# Patient Record
Sex: FEMALE | Race: WHITE | NOT HISPANIC OR LATINO | Employment: OTHER | URBAN - METROPOLITAN AREA
[De-identification: names, ages, dates, MRNs, and addresses within clinical notes are randomized per-mention and may not be internally consistent; named-entity substitution may affect disease eponyms.]

---

## 2017-01-16 ENCOUNTER — TELEPHONE (OUTPATIENT)
Dept: INTERNAL MEDICINE | Facility: CLINIC | Age: 62
End: 2017-01-16

## 2017-01-16 ENCOUNTER — PATIENT MESSAGE (OUTPATIENT)
Dept: INTERNAL MEDICINE | Facility: CLINIC | Age: 62
End: 2017-01-16

## 2017-01-16 NOTE — TELEPHONE ENCOUNTER
----- Message from Mason Landon sent at 1/16/2017  2:11 PM CST -----  Contact: self  Appointment Request From: Martha Mcfarlane         With Provider: Nas Mcneill MD [-Primary Care Physician-]        Would Accept With:Only the person I've selected        Preferred Date Range: From 1/16/2017 To 1/29/2017        Preferred Times: Any        Reason for visit: Request an Appt        Comments:      Right now I have a yearly physical appointment with Dr. Mcneill on Monday, Jan. 23, 2017 at 1:45p.m. I usually try to get an early morning appointment since I do the lab work while I have fasted. If you are able to change my appointment to a morning one within the next two weeks, that would be fine. You may reply through [facility].

## 2017-01-23 ENCOUNTER — OFFICE VISIT (OUTPATIENT)
Dept: INTERNAL MEDICINE | Facility: CLINIC | Age: 62
End: 2017-01-23
Payer: COMMERCIAL

## 2017-01-23 VITALS
WEIGHT: 142.63 LBS | SYSTOLIC BLOOD PRESSURE: 120 MMHG | BODY MASS INDEX: 19.32 KG/M2 | HEART RATE: 80 BPM | HEIGHT: 72 IN | TEMPERATURE: 98 F | DIASTOLIC BLOOD PRESSURE: 74 MMHG

## 2017-01-23 DIAGNOSIS — Z12.31 ENCOUNTER FOR SCREENING MAMMOGRAM FOR MALIGNANT NEOPLASM OF BREAST: ICD-10-CM

## 2017-01-23 DIAGNOSIS — J20.8 ACUTE BACTERIAL BRONCHITIS: ICD-10-CM

## 2017-01-23 DIAGNOSIS — E21.3 HYPERPARATHYROIDISM: ICD-10-CM

## 2017-01-23 DIAGNOSIS — M81.0 OSTEOPOROSIS: ICD-10-CM

## 2017-01-23 DIAGNOSIS — B96.89 ACUTE BACTERIAL BRONCHITIS: ICD-10-CM

## 2017-01-23 DIAGNOSIS — K59.00 CONSTIPATION, UNSPECIFIED CONSTIPATION TYPE: ICD-10-CM

## 2017-01-23 DIAGNOSIS — Z00.00 ROUTINE PHYSICAL EXAMINATION: Primary | ICD-10-CM

## 2017-01-23 DIAGNOSIS — K59.09 CHRONIC CONSTIPATION: ICD-10-CM

## 2017-01-23 PROCEDURE — 99999 PR PBB SHADOW E&M-EST. PATIENT-LVL III: CPT | Mod: PBBFAC,,, | Performed by: INTERNAL MEDICINE

## 2017-01-23 PROCEDURE — 99396 PREV VISIT EST AGE 40-64: CPT | Mod: S$GLB,,, | Performed by: INTERNAL MEDICINE

## 2017-01-23 RX ORDER — DOXYCYCLINE 100 MG/1
100 CAPSULE ORAL 2 TIMES DAILY
Qty: 20 CAPSULE | Refills: 0 | Status: SHIPPED | OUTPATIENT
Start: 2017-01-23 | End: 2017-05-04

## 2017-01-23 RX ORDER — PREDNISONE 20 MG/1
20 TABLET ORAL 2 TIMES DAILY
Qty: 10 TABLET | Refills: 0 | Status: SHIPPED | OUTPATIENT
Start: 2017-01-23 | End: 2017-01-28

## 2017-01-23 NOTE — PROGRESS NOTES
Subjective:       Patient ID: Martha Mcfarlane is a 61 y.o. female.    Chief Complaint: Annual Exam and Cough    HPI Comments: Patient comes in for annual exam but she states she's been sick for nearly 2 weeks.  Originally was having cough with thick sputum and temperature to 101.  In the last several days the temperature is improving but she still having cough with some green mucus.  Maybe even she's tried ibuprofen and Mucinex.  Her son was ill with a similar illness but she has improved she said arthritis, renal insufficiency, renal stones, osteoporosis and parathyroidism.  Previously seeing endocrinology but not recently.    Review of Systems   Constitutional: Positive for fatigue and fever. Negative for activity change, appetite change, chills and unexpected weight change.   HENT: Positive for congestion. Negative for hearing loss, rhinorrhea, sore throat and trouble swallowing.    Eyes: Negative for discharge and visual disturbance.   Respiratory: Positive for cough and wheezing. Negative for chest tightness and shortness of breath.    Cardiovascular: Negative for chest pain, palpitations and leg swelling.   Gastrointestinal: Negative for abdominal pain, blood in stool, constipation, diarrhea and vomiting.   Endocrine: Negative for polydipsia and polyuria.   Genitourinary: Negative for difficulty urinating, dysuria, hematuria and menstrual problem.   Musculoskeletal: Negative for arthralgias, joint swelling, neck pain and neck stiffness.   Skin: Negative for rash.   Neurological: Negative for weakness and headaches.   Psychiatric/Behavioral: Negative for confusion and dysphoric mood.       Objective:      Physical Exam   Constitutional: She is oriented to person, place, and time. She appears well-developed and well-nourished.   HENT:   Head: Normocephalic and atraumatic.   Right Ear: Tympanic membrane, external ear and ear canal normal.   Left Ear: Tympanic membrane, external ear and ear canal normal.   Nose:  Nose normal.   Mouth/Throat: Oropharynx is clear and moist and mucous membranes are normal. No oropharyngeal exudate.   Eyes: Conjunctivae and EOM are normal. Pupils are equal, round, and reactive to light. Right eye exhibits no discharge. Left eye exhibits no discharge.   Neck: Normal range of motion. Neck supple. No JVD present. No thyromegaly present.   Cardiovascular: Normal rate, regular rhythm, normal heart sounds and intact distal pulses.    No murmur heard.  Pulmonary/Chest: Effort normal. No respiratory distress. She has wheezes (Mild and diffuse). She has rales (mild and diffuse).   Abdominal: Soft. Bowel sounds are normal. She exhibits no mass. There is no tenderness.   Musculoskeletal: Normal range of motion. She exhibits no edema or tenderness.   Lymphadenopathy:     She has no cervical adenopathy.        Right: No supraclavicular adenopathy present.        Left: No supraclavicular adenopathy present.   Neurological: She is alert and oriented to person, place, and time. She has normal reflexes. No cranial nerve deficit.   Skin: No rash noted.   Psychiatric: She has a normal mood and affect. She does not exhibit a depressed mood.       Assessment:       1. Routine physical examination    2. Hyperparathyroidism    3. Chronic constipation    4. Osteoporosis    5. Encounter for screening mammogram for malignant neoplasm of breast    6. Constipation, unspecified constipation type        Plan:       Martha LARA was seen today for annual exam and cough.    Diagnoses and all orders for this visit:    Routine physical examination  -     Lipid panel; Future  -     CBC auto differential; Future  -     Comprehensive metabolic panel; Future  -     TSH; Future    Hyperparathyroidism  -     PTH, intact; Future  -     Vitamin D; Future    Chronic constipation  -     Lipid panel; Future  -     CBC auto differential; Future  -     Comprehensive metabolic panel; Future  -     TSH; Future    Osteoporosis  -     PTH, intact;  Future    Encounter for screening mammogram for malignant neoplasm of breast  -     Mammo Digital Screening Bilat with CAD; Future  -     Lipid panel; Future  -     CBC auto differential; Future  -     Comprehensive metabolic panel; Future  -     TSH; Future    Constipation, unspecified constipation type    Acute bacterial bronchitis    Other orders  -     predniSONE (DELTASONE) 20 MG tablet; Take 1 tablet (20 mg total) by mouth 2 (two) times daily.  -     doxycycline (MONODOX) 100 MG capsule; Take 1 capsule (100 mg total) by mouth 2 (two) times daily.        call if respiratory symptoms fail to improve or worsen.  Labs next week with a flu shot.

## 2017-01-23 NOTE — MR AVS SNAPSHOT
[facility]  [address]  [facility] [address]  Phone: [phone]  Fax: [phone]                  Martha Mcfarlane   2017 1:45 PM   Office Visit    Description:  Female : 1955   Provider:  Nas Mcneill MD   Department:  [facility]           Reason for Visit     Annual Exam     Cough           Diagnoses this Visit        Comments    Routine physical examination    -  Primary     Hyperparathyroidism         Chronic constipation         Osteoporosis         Encounter for screening mammogram for malignant neoplasm of breast         Constipation, unspecified constipation type         Acute bacterial bronchitis                To Do List           Goals (5 Years of Data)     None       These Medications        Disp Refills Start End    predniSONE (DELTASONE) 20 MG tablet 10 tablet 0 2017    Take 1 tablet (20 mg total) by mouth 2 (two) times daily. - Oral    Pharmacy: [facility] [address] [place] [id number] [facility] [address] Ph #: [phone]       doxycycline (MONODOX) 100 MG capsule 20 capsule 0 2017     Take 1 capsule (100 mg total) by mouth 2 (two) times daily. - Oral    Pharmacy: [address] [address] [address] Ph #: [phone]         [facility]     [facility] Care [facility] -  Assistance  Registered nurses in the [facility] provide clinical advisement, health education, appointment booking, and other advisory services.  Call for this free service at [phone].             Medications           Message regarding Medications     Verify the changes and/or additions to your medication regime listed below are the same as discussed with your clinician today.  If any of these changes or additions are incorrect, please notify your healthcare provider.        START taking these NEW medications        Refills    predniSONE (DELTASONE) 20 MG tablet 0    Sig: Take 1 tablet (20 mg  "total) by mouth 2 (two) times daily.    Class: Normal    Route: Oral    doxycycline (MONODOX) 100 MG capsule 0    Sig: Take 1 capsule (100 mg total) by mouth 2 (two) times daily.    Class: Normal    Route: Oral           Verify that the below list of medications is an accurate representation of the medications you are currently taking.  If none reported, the list may be blank. If incorrect, please contact your healthcare provider. Carry this list with you in case of emergency.           Current Medications     aspirin (ECOTRIN) 81 MG EC tablet Take 81 mg by mouth once daily.    CALCIUM CARBONATE/VITAMIN D3 (VITAMIN D-3 ORAL) Take by mouth.    doxycycline (MONODOX) 100 MG capsule Take 1 capsule (100 mg total) by mouth 2 (two) times daily.    fish oil-omega-3 fatty acids 300-1,000 mg capsule Take 2 g by mouth once daily.    hydrochlorothiazide (MICROZIDE) 12.5 mg capsule Take 1 capsule (12.5 mg total) by mouth once daily.    magnesium 30 mg Tab Take by mouth.    multivitamin (ONE DAILY MULTIVITAMIN) per tablet Take 1 tablet by mouth once daily.    predniSONE (DELTASONE) 20 MG tablet Take 1 tablet (20 mg total) by mouth 2 (two) times daily.           Clinical Reference Information           Vital Signs - Last Recorded  Most recent update: 1/23/2017  2:21 PM by Precious Mcfadden    BP Pulse Temp Ht Wt BMI    120/74 80 98.1 °F (36.7 °C) (Oral) 6' 1" (1.854 m) 64.7 kg (142 lb 10.2 oz) 18.82 kg/m2      Blood Pressure          Most Recent Value    BP  120/74      Allergies as of 1/23/2017     Orange Flavor    Iodine And Iodide Containing Products    Shellfish Containing Products    Benadryl [Diphenhydramine Hcl]      Immunizations Administered on Date of Encounter - 1/23/2017     None      Orders Placed During Today's Visit     Future Labs/Procedures Expected by Expires    CBC auto differential  1/23/2017 1/23/2018    Comprehensive metabolic panel  1/23/2017 1/23/2018    Lipid panel  1/23/2017 1/23/2018    Mammo Digital " Screening Bilat with CAD  1/23/2017 3/26/2018    PTH, intact  1/23/2017 4/23/2017    TSH  1/23/2017 1/23/2018    Vitamin D  1/23/2017 (Approximate) 4/23/2017

## 2017-01-30 ENCOUNTER — IMMUNIZATION (OUTPATIENT)
Dept: INTERNAL MEDICINE | Facility: CLINIC | Age: 62
End: 2017-01-30
Payer: COMMERCIAL

## 2017-01-30 ENCOUNTER — LAB VISIT (OUTPATIENT)
Dept: LAB | Facility: HOSPITAL | Age: 62
End: 2017-01-30
Attending: INTERNAL MEDICINE
Payer: COMMERCIAL

## 2017-01-30 DIAGNOSIS — Z12.31 ENCOUNTER FOR SCREENING MAMMOGRAM FOR MALIGNANT NEOPLASM OF BREAST: ICD-10-CM

## 2017-01-30 DIAGNOSIS — E21.3 HYPERPARATHYROIDISM: ICD-10-CM

## 2017-01-30 DIAGNOSIS — K59.09 CHRONIC CONSTIPATION: ICD-10-CM

## 2017-01-30 DIAGNOSIS — Z00.00 ROUTINE PHYSICAL EXAMINATION: ICD-10-CM

## 2017-01-30 DIAGNOSIS — M81.0 OSTEOPOROSIS: ICD-10-CM

## 2017-01-30 LAB
25(OH)D3+25(OH)D2 SERPL-MCNC: 32 NG/ML
ALBUMIN SERPL BCP-MCNC: 3.9 G/DL
ALP SERPL-CCNC: 72 U/L
ALT SERPL W/O P-5'-P-CCNC: 10 U/L
ANION GAP SERPL CALC-SCNC: 6 MMOL/L
AST SERPL-CCNC: 12 U/L
BASOPHILS # BLD AUTO: 0.05 K/UL
BASOPHILS NFR BLD: 1 %
BILIRUB SERPL-MCNC: 0.6 MG/DL
BUN SERPL-MCNC: 16 MG/DL
CALCIUM SERPL-MCNC: 9.4 MG/DL
CHLORIDE SERPL-SCNC: 102 MMOL/L
CHOLEST/HDLC SERPL: 3 {RATIO}
CO2 SERPL-SCNC: 32 MMOL/L
CREAT SERPL-MCNC: 0.8 MG/DL
DIFFERENTIAL METHOD: NORMAL
EOSINOPHIL # BLD AUTO: 0.1 K/UL
EOSINOPHIL NFR BLD: 2.2 %
ERYTHROCYTE [DISTWIDTH] IN BLOOD BY AUTOMATED COUNT: 13.2 %
EST. GFR  (AFRICAN AMERICAN): >60 ML/MIN/1.73 M^2
EST. GFR  (NON AFRICAN AMERICAN): >60 ML/MIN/1.73 M^2
GLUCOSE SERPL-MCNC: 90 MG/DL
HCT VFR BLD AUTO: 40.3 %
HDL/CHOLESTEROL RATIO: 33.8 %
HDLC SERPL-MCNC: 154 MG/DL
HDLC SERPL-MCNC: 52 MG/DL
HGB BLD-MCNC: 13.4 G/DL
LDLC SERPL CALC-MCNC: 89.8 MG/DL
LYMPHOCYTES # BLD AUTO: 1.5 K/UL
LYMPHOCYTES NFR BLD: 29.4 %
MCH RBC QN AUTO: 29.2 PG
MCHC RBC AUTO-ENTMCNC: 33.3 %
MCV RBC AUTO: 88 FL
MONOCYTES # BLD AUTO: 0.5 K/UL
MONOCYTES NFR BLD: 10.2 %
NEUTROPHILS # BLD AUTO: 2.9 K/UL
NEUTROPHILS NFR BLD: 57 %
NONHDLC SERPL-MCNC: 102 MG/DL
PLATELET # BLD AUTO: 315 K/UL
PMV BLD AUTO: 9.9 FL
POTASSIUM SERPL-SCNC: 4 MMOL/L
PROT SERPL-MCNC: 7.3 G/DL
PTH-INTACT SERPL-MCNC: 108 PG/ML
RBC # BLD AUTO: 4.59 M/UL
SODIUM SERPL-SCNC: 140 MMOL/L
TRIGL SERPL-MCNC: 61 MG/DL
TSH SERPL DL<=0.005 MIU/L-ACNC: 0.85 UIU/ML
WBC # BLD AUTO: 5 K/UL

## 2017-01-30 PROCEDURE — 36415 COLL VENOUS BLD VENIPUNCTURE: CPT

## 2017-01-30 PROCEDURE — 90471 IMMUNIZATION ADMIN: CPT | Mod: S$GLB,,, | Performed by: INTERNAL MEDICINE

## 2017-01-30 PROCEDURE — 82306 VITAMIN D 25 HYDROXY: CPT

## 2017-01-30 PROCEDURE — 90688 IIV4 VACCINE SPLT 0.5 ML IM: CPT | Mod: S$GLB,,, | Performed by: INTERNAL MEDICINE

## 2017-01-30 PROCEDURE — 80061 LIPID PANEL: CPT

## 2017-01-30 PROCEDURE — 80053 COMPREHEN METABOLIC PANEL: CPT

## 2017-01-30 PROCEDURE — 84443 ASSAY THYROID STIM HORMONE: CPT

## 2017-01-30 PROCEDURE — 83970 ASSAY OF PARATHORMONE: CPT

## 2017-01-30 PROCEDURE — 85025 COMPLETE CBC W/AUTO DIFF WBC: CPT

## 2017-02-01 ENCOUNTER — PATIENT MESSAGE (OUTPATIENT)
Dept: INTERNAL MEDICINE | Facility: CLINIC | Age: 62
End: 2017-02-01

## 2017-02-01 DIAGNOSIS — E21.3 HYPERPARATHYROIDISM: Primary | ICD-10-CM

## 2017-03-01 ENCOUNTER — PATIENT MESSAGE (OUTPATIENT)
Dept: INTERNAL MEDICINE | Facility: CLINIC | Age: 62
End: 2017-03-01

## 2017-03-31 ENCOUNTER — PATIENT MESSAGE (OUTPATIENT)
Dept: ENDOCRINOLOGY | Facility: CLINIC | Age: 62
End: 2017-03-31

## 2017-05-04 ENCOUNTER — OFFICE VISIT (OUTPATIENT)
Dept: INTERNAL MEDICINE | Facility: CLINIC | Age: 62
End: 2017-05-04
Payer: COMMERCIAL

## 2017-05-04 VITALS
BODY MASS INDEX: 19.41 KG/M2 | WEIGHT: 143.31 LBS | DIASTOLIC BLOOD PRESSURE: 80 MMHG | HEART RATE: 62 BPM | SYSTOLIC BLOOD PRESSURE: 122 MMHG | HEIGHT: 72 IN

## 2017-05-04 DIAGNOSIS — K59.09 CHRONIC CONSTIPATION: ICD-10-CM

## 2017-05-04 DIAGNOSIS — R11.2 NAUSEA AND VOMITING, INTRACTABILITY OF VOMITING NOT SPECIFIED, UNSPECIFIED VOMITING TYPE: Primary | ICD-10-CM

## 2017-05-04 DIAGNOSIS — H00.012 HORDEOLUM EXTERNUM OF RIGHT LOWER EYELID: ICD-10-CM

## 2017-05-04 DIAGNOSIS — G47.00 INSOMNIA, UNSPECIFIED TYPE: ICD-10-CM

## 2017-05-04 PROCEDURE — 99213 OFFICE O/P EST LOW 20 MIN: CPT | Mod: S$GLB,,, | Performed by: INTERNAL MEDICINE

## 2017-05-04 PROCEDURE — 99999 PR PBB SHADOW E&M-EST. PATIENT-LVL III: CPT | Mod: PBBFAC,,, | Performed by: INTERNAL MEDICINE

## 2017-05-04 PROCEDURE — 1160F RVW MEDS BY RX/DR IN RCRD: CPT | Mod: S$GLB,,, | Performed by: INTERNAL MEDICINE

## 2017-05-04 RX ORDER — PROMETHAZINE HYDROCHLORIDE 12.5 MG/1
12.5 TABLET ORAL EVERY 6 HOURS PRN
Qty: 12 TABLET | Refills: 0 | Status: SHIPPED | OUTPATIENT
Start: 2017-05-04 | End: 2017-07-25

## 2017-05-04 NOTE — MR AVS SNAPSHOT
[facility]  [address]  [facility] [address]  Phone: [phone]  Fax: [phone]                  Martha Mcfarlane   2017 1:45 PM   Office Visit    Description:  Female : 1955   Provider:  Nas Mcneill MD   Department:  [facility]           Reason for Visit     Nausea           Diagnoses this Visit        Comments    Nausea and vomiting, intractability of vomiting not specified, unspecified vomiting type    -  Primary     Hordeolum externum of right lower eyelid     Significantly improving with home remedy.    Chronic constipation         Insomnia, unspecified type                To Do List           Goals (5 Years of Data)     None       These Medications        Disp Refills Start End    promethazine (PHENERGAN) 12.5 MG Tab 12 tablet 0 2017     Take 1 tablet (12.5 mg total) by mouth every 6 (six) hours as needed. - Oral    Pharmacy: [facility] [address] [place] [address] Ph #: [phone]         [facility]     [facility] On Call Nurse Care Line -  Assistance  Unless otherwise directed by your provider, please contact [facility], our nurse care line that is available for  assistance.     Registered nurses in the [facility] Center provide: appointment scheduling, clinical advisement, health education, and other advisory services.  Call: [phone] (toll free)               Medications           Message regarding Medications     Verify the changes and/or additions to your medication regime listed below are the same as discussed with your clinician today.  If any of these changes or additions are incorrect, please notify your healthcare provider.        START taking these NEW medications        Refills    promethazine (PHENERGAN) 12.5 MG Tab 0    Sig: Take 1 tablet (12.5 mg total) by mouth every 6 (six) hours as needed.    Class: Normal    Route: Oral      STOP taking these medications      doxycycline (MONODOX) 100 MG capsule Take 1 capsule (100 mg total) by mouth 2 (two) times daily.    hydrochlorothiazide (MICROZIDE) 12.5 mg capsule Take 1 capsule (12.5 mg total) by mouth once daily.           Verify that the below list of medications is an accurate representation of the medications you are currently taking.  If none reported, the list may be blank. If incorrect, please contact your healthcare provider. Carry this list with you in case of emergency.           Current Medications     aspirin (ECOTRIN) 81 MG EC tablet Take 81 mg by mouth once daily.    CALCIUM CARBONATE/VITAMIN D3 (VITAMIN D-3 ORAL) Take by mouth.    fish oil-omega-3 fatty acids 300-1,000 mg capsule Take 2 g by mouth once daily.    magnesium 30 mg Tab Take by mouth.    multivitamin (ONE DAILY MULTIVITAMIN) per tablet Take 1 tablet by mouth once daily.    promethazine (PHENERGAN) 12.5 MG Tab Take 1 tablet (12.5 mg total) by mouth every 6 (six) hours as needed.           Clinical Reference Information           Your Vitals Were     BP Pulse Height Weight BMI    122/80 62 6' (1.829 m) 65 kg (143 lb 4.8 oz) 19.43 kg/m2      Blood Pressure          Most Recent Value    BP  122/80      Allergies as of 5/4/2017     Orange Flavor    Iodine And Iodide Containing Products    Shellfish Containing Products    Benadryl [Diphenhydramine Hcl]      Immunizations Administered on Date of Encounter - 5/4/2017     None      Language Assistance Services     ATTENTION: Language assistance services are available, free of charge. Please call [phone].      ATENCIÓN: Si habla español, tiene a soliman disposición servicios gratuitos de asistencia lingüística. Llame al [phone].     [facility] Ý: N?u b?n nói Ti?ng Vi?t, có các d?ch v? h? tr? ngôn ng? mi?n phí dành cho b?n. G?i s? [phone].         [facility] complies with applicable Federal civil rights laws and does not discriminate on the basis of race, color, national origin, age,  disability, or sex.

## 2017-05-04 NOTE — PROGRESS NOTES
Subjective:       Patient ID: Martha Mcfarlane is a 61 y.o. female.    Chief Complaint: Nausea    HPI Comments: Patient complains of about 3 days of nausea and vomiting improved somewhat similar syndrome is been ill and she didn't eat anything unusual.  No diarrhea or abdominal tenderness without problem he denies any urinary symptoms.  She has a resolving stye on the right lower eyelid..  She is having some insomnia since this started but we'll treated symptomatically.    Nausea   Associated symptoms include nausea and vomiting. Pertinent negatives include no arthralgias, chest pain, headaches, joint swelling or weakness.     Review of Systems   Constitutional: Negative for activity change and unexpected weight change.   HENT: Negative for hearing loss, rhinorrhea and trouble swallowing.    Eyes: Positive for redness (stye right lower lid). Negative for discharge and visual disturbance.   Respiratory: Negative for chest tightness and wheezing.    Cardiovascular: Negative for chest pain and palpitations.   Gastrointestinal: Positive for constipation, nausea and vomiting. Negative for blood in stool and diarrhea.   Endocrine: Negative for polydipsia and polyuria.   Genitourinary: Negative for difficulty urinating, dysuria, hematuria and menstrual problem.   Musculoskeletal: Negative for arthralgias and joint swelling.   Neurological: Negative for weakness and headaches.   Psychiatric/Behavioral: Negative for confusion and dysphoric mood.       Objective:      Physical Exam   Constitutional: She is oriented to person, place, and time. She appears well-developed and well-nourished. No distress.   HENT:   Head: Normocephalic and atraumatic.   Mouth/Throat: Oropharynx is clear and moist. No oropharyngeal exudate.   Eyes: Pupils are equal, round, and reactive to light. No scleral icterus.   Resolving area of swelling at the right lower eyelid probable stye   Neck: Normal range of motion. Neck supple. No thyromegaly present.    Cardiovascular: Normal rate and regular rhythm.    No murmur heard.  Pulmonary/Chest: Effort normal and breath sounds normal. She has no wheezes.   Abdominal: Soft. Bowel sounds are normal. She exhibits no distension and no mass. There is no tenderness. There is no rebound and no guarding. No hernia.   Musculoskeletal: She exhibits no tenderness.   Lymphadenopathy:     She has no cervical adenopathy.   Neurological: She is alert and oriented to person, place, and time.   Skin: No rash noted.   Psychiatric: She has a normal mood and affect.       Assessment:       1. Nausea and vomiting, intractability of vomiting not specified, unspecified vomiting type    2. Hordeolum externum of right lower eyelid    3. Chronic constipation    4. Insomnia, unspecified type        Plan:       Martha LARA was seen today for nausea.    Diagnoses and all orders for this visit:    Nausea and vomiting, intractability of vomiting not specified, unspecified vomiting type    Hordeolum externum of right lower eyelid  Comments:  Significantly improving with home remedy.    Chronic constipation    Insomnia, unspecified type    Other orders  -     promethazine (PHENERGAN) 12.5 MG Tab; Take 1 tablet (12.5 mg total) by mouth every 6 (six) hours as needed.        [place] diet.  Trial of Phenergan as above.  Call if any problems recur

## 2017-06-14 ENCOUNTER — PATIENT MESSAGE (OUTPATIENT)
Dept: ENDOCRINOLOGY | Facility: CLINIC | Age: 62
End: 2017-06-14

## 2017-07-25 ENCOUNTER — OFFICE VISIT (OUTPATIENT)
Dept: ENDOCRINOLOGY | Facility: CLINIC | Age: 62
End: 2017-07-25
Payer: COMMERCIAL

## 2017-07-25 VITALS
HEIGHT: 72 IN | DIASTOLIC BLOOD PRESSURE: 80 MMHG | SYSTOLIC BLOOD PRESSURE: 138 MMHG | BODY MASS INDEX: 19.56 KG/M2 | WEIGHT: 144.38 LBS | HEART RATE: 70 BPM

## 2017-07-25 DIAGNOSIS — N20.0 NEPHROLITHIASIS: ICD-10-CM

## 2017-07-25 DIAGNOSIS — E21.3 HYPERPARATHYROIDISM: ICD-10-CM

## 2017-07-25 DIAGNOSIS — M81.0 OSTEOPOROSIS WITHOUT CURRENT PATHOLOGICAL FRACTURE, UNSPECIFIED OSTEOPOROSIS TYPE: Primary | ICD-10-CM

## 2017-07-25 PROCEDURE — 99214 OFFICE O/P EST MOD 30 MIN: CPT | Mod: S$GLB,,, | Performed by: INTERNAL MEDICINE

## 2017-07-25 PROCEDURE — 99999 PR PBB SHADOW E&M-EST. PATIENT-LVL III: CPT | Mod: PBBFAC,,, | Performed by: INTERNAL MEDICINE

## 2017-07-25 RX ORDER — MONTELUKAST SODIUM 10 MG/1
1 TABLET ORAL DAILY
COMMUNITY
Start: 2017-07-11 | End: 2019-01-29

## 2017-07-25 RX ORDER — ALENDRONATE SODIUM 70 MG/1
TABLET ORAL
Qty: 12 TABLET | Refills: 3 | Status: SHIPPED | OUTPATIENT
Start: 2017-07-25 | End: 2018-07-10

## 2017-07-25 RX ORDER — AMOXICILLIN 500 MG/1
1 CAPSULE ORAL 3 TIMES DAILY
COMMUNITY
Start: 2017-07-10 | End: 2019-01-29

## 2017-07-25 RX ORDER — ALENDRONATE SODIUM 70 MG/1
TABLET ORAL
Qty: 4 TABLET | Refills: 0 | Status: SHIPPED | OUTPATIENT
Start: 2017-07-25 | End: 2017-07-25 | Stop reason: SDUPTHER

## 2017-07-25 NOTE — PROGRESS NOTES
"Subjective:     Patient ID: Martha Mcfarlane is a 61 y.o. female.    Chief Complaint: Osteoporosis    HPI:   Ms. Mcfarlane is a 61 y.o. female who is here for a follow-up visit for evaluation osteoporosis on outside BMDs.    Previously tried:  Prolia 60 mg due to severe constipation  Reclast 5 mg IV once a yearly X 1 - severe constipation  Tried alendronate - last dose was 2014  Takes calcium and vitamin D daily       Had bad sinus infection on amoxicillin.     Reports a fall in [employer] (3/2017) - no fractures  Reports fall in her attic (5/2017) - no fractures    Has constipation for many years.Taking miralax regularly. Denies recent fractures. Reports kidney stones this year, has had stones in the past. No hypercalcemia on recent tests and urine calcium level three years ago was within range.     Still walking and very active (cuts her own grass)     Review of Systems   Constitutional: Negative for chills and fever.   HENT: Negative for congestion.         Sinus infection   Eyes: Negative for visual disturbance.   Respiratory: Negative for chest tightness and shortness of breath.    Cardiovascular: Negative for chest pain, palpitations and leg swelling.   Gastrointestinal: Negative for abdominal pain and vomiting.   Genitourinary: Negative for dysuria.   Musculoskeletal: Negative for arthralgias.   Skin: Negative for rash.   Neurological: Negative for weakness.   Hematological: Does not bruise/bleed easily.   Psychiatric/Behavioral: Negative for sleep disturbance.        Objective:     Physical Exam    Vitals:    07/25/17 1455   BP: (!) 140/82   BP Location: Left arm   Patient Position: Sitting   BP Method: Manual   Pulse: 70   Weight: 65.5 kg (144 lb 6.4 oz)   Height: 6' 1.5" (1.867 m)     Results for MARTHA MCFARLANE (MRN [id number]) as of 7/25/2017 15:15   Ref. Range 1/30/2017 10:06   Sodium Latest Ref Range: 136 - 145 mmol/L 140   Potassium Latest Ref Range: 3.5 - 5.1 mmol/L 4.0   Chloride Latest Ref Range: 95 - 110 mmol/L " 102   CO2 Latest Ref Range: 23 - 29 mmol/L 32 (H)   Anion Gap Latest Ref Range: 8 - 16 mmol/L 6 (L)   BUN, Bld Latest Ref Range: 8 - 23 mg/dL 16   Creatinine Latest Ref Range: 0.5 - 1.4 mg/dL 0.8   eGFR if non African American Latest Ref Range: >60 mL/min/1.73 m^2 >60.0   eGFR if African American Latest Ref Range: >60 mL/min/1.73 m^2 >60.0   Glucose Latest Ref Range: 70 - 110 mg/dL 90   Calcium Latest Ref Range: 8.7 - 10.5 mg/dL 9.4   Alkaline Phosphatase Latest Ref Range: 55 - 135 U/L 72   Total Protein Latest Ref Range: 6.0 - 8.4 g/dL 7.3   Albumin Latest Ref Range: 3.5 - 5.2 g/dL 3.9   Total Bilirubin Latest Ref Range: 0.1 - 1.0 mg/dL 0.6   AST Latest Ref Range: 10 - 40 U/L 12   ALT Latest Ref Range: 10 - 44 U/L 10   Triglycerides Latest Ref Range: 30 - 150 mg/dL 61   Cholesterol Latest Ref Range: 120 - 199 mg/dL 154   HDL Latest Ref Range: 40 - 75 mg/dL 52   LDL Cholesterol Latest Ref Range: 63.0 - 159.0 mg/dL 89.8   Total Cholesterol/HDL Ratio Latest Ref Range: 2.0 - 5.0  3.0   Vit D, 25-Hydroxy Latest Ref Range: 30 - 96 ng/mL 32   TSH Latest Ref Range: 0.400 - 4.000 uIU/mL 0.847   PTH Latest Ref Range: 9.0 - 77.0 pg/mL 108.0 (H)     Assessment/Plan:     Ms. Mcfarlane is a 61 year old woman with osteoporosis with history of falls in the past six months, without fractures. Has BMD that demonstrates worsening, per the patient. I have not been able to review images/report.   For now, will recommend restarting fosamax as this is what she can tolerate.   Will pursue further evaluation of hyperparathyroidism as patient has nephrolithiasis and osteoporosis. Have asked her to cut back a little bit on calcium to one calcium tablet per day and one mvi. Needs higher dose of vitamin D.     1. Osteoporosis without current pathological fracture, unspecified osteoporosis type  - restart fosamax 70 mg weekly   - Calcium, Timed Urine; Future  - Creatinine Clearance, Timed, Urine; Future    2. Hyperparathyroidism  - discussed options  for treatment due to osteoporosis and nephrolithiasis  - does not have hypercalcemia, needs repeat urine calcium; consider sestamibi and ultrasound; discussed that negative studies don't indicate absence of disease. In the right context we would still consider parathyroidectomy.   - Calcium, Timed Urine; Future  - Creatinine Clearance, Timed, Urine; Future  - PTH and renal function panel the day she turns in her urine study.     3. Nephrolithiasis    - Calcium, Timed Urine; Future  - Creatinine Clearance, Timed, Urine; Future

## 2017-07-25 NOTE — PATIENT INSTRUCTIONS
Restart fosamax 70 mg once weekly.     Will repeat 24 urine collection. Labs the day you turn in the urine specimen.     Please take at least 1400 - 2000 IUs daily of vitamin D3  Calcium 600 mg tablet  MVI (400 - 500 mg of calcium)

## 2017-07-27 ENCOUNTER — TELEPHONE (OUTPATIENT)
Dept: ENDOCRINOLOGY | Facility: CLINIC | Age: 62
End: 2017-07-27

## 2017-07-27 NOTE — TELEPHONE ENCOUNTER
Directions have been clarified.    ----- Message from Benjy Westbrook sent at 7/27/2017 11:07 AM CDT -----  Contact: [facility]  Pharmacy would like a call back in ref to pt rx for alendronate (FOSAMAX) 70 MG tablet    Can be reached at [phone]

## 2017-07-28 ENCOUNTER — PATIENT MESSAGE (OUTPATIENT)
Dept: ENDOCRINOLOGY | Facility: CLINIC | Age: 62
End: 2017-07-28

## 2017-07-28 ENCOUNTER — LAB VISIT (OUTPATIENT)
Dept: LAB | Facility: HOSPITAL | Age: 62
End: 2017-07-28
Attending: INTERNAL MEDICINE
Payer: COMMERCIAL

## 2017-07-28 DIAGNOSIS — E21.3 HYPERPARATHYROIDISM: ICD-10-CM

## 2017-07-28 LAB
ALBUMIN SERPL BCP-MCNC: 4.2 G/DL
ANION GAP SERPL CALC-SCNC: 11 MMOL/L
BUN SERPL-MCNC: 15 MG/DL
CALCIUM SERPL-MCNC: 9.2 MG/DL
CHLORIDE SERPL-SCNC: 105 MMOL/L
CO2 SERPL-SCNC: 24 MMOL/L
CREAT SERPL-MCNC: 0.7 MG/DL
EST. GFR  (AFRICAN AMERICAN): >60 ML/MIN/1.73 M^2
EST. GFR  (NON AFRICAN AMERICAN): >60 ML/MIN/1.73 M^2
GLUCOSE SERPL-MCNC: 77 MG/DL
PHOSPHATE SERPL-MCNC: 4 MG/DL
POTASSIUM SERPL-SCNC: 4.1 MMOL/L
PTH-INTACT SERPL-MCNC: 77 PG/ML
SODIUM SERPL-SCNC: 140 MMOL/L

## 2017-07-28 PROCEDURE — 83970 ASSAY OF PARATHORMONE: CPT

## 2017-07-28 PROCEDURE — 80069 RENAL FUNCTION PANEL: CPT

## 2017-07-28 PROCEDURE — 36415 COLL VENOUS BLD VENIPUNCTURE: CPT

## 2017-07-31 NOTE — TELEPHONE ENCOUNTER
Urine ca 265 mg/sp  pth and serum negro and phos are nl   restrt fosamax as patient is intolerant of other options.

## 2017-08-01 ENCOUNTER — PATIENT MESSAGE (OUTPATIENT)
Dept: ENDOCRINOLOGY | Facility: CLINIC | Age: 62
End: 2017-08-01

## 2017-08-08 ENCOUNTER — PATIENT MESSAGE (OUTPATIENT)
Dept: ENDOCRINOLOGY | Facility: CLINIC | Age: 62
End: 2017-08-08

## 2017-09-12 ENCOUNTER — PATIENT MESSAGE (OUTPATIENT)
Dept: INTERNAL MEDICINE | Facility: CLINIC | Age: 62
End: 2017-09-12

## 2017-09-12 DIAGNOSIS — R05.9 COUGH: ICD-10-CM

## 2017-09-12 DIAGNOSIS — R35.0 URINARY FREQUENCY: Primary | ICD-10-CM

## 2017-09-12 NOTE — TELEPHONE ENCOUNTER
I sent her a portal message and am waiting on her reply. I placed orders for the requested urine and CXR

## 2017-09-14 ENCOUNTER — PATIENT MESSAGE (OUTPATIENT)
Dept: INTERNAL MEDICINE | Facility: CLINIC | Age: 62
End: 2017-09-14

## 2017-09-15 ENCOUNTER — PATIENT MESSAGE (OUTPATIENT)
Dept: INTERNAL MEDICINE | Facility: CLINIC | Age: 62
End: 2017-09-15

## 2017-09-15 ENCOUNTER — HOSPITAL ENCOUNTER (OUTPATIENT)
Dept: RADIOLOGY | Facility: HOSPITAL | Age: 62
Discharge: HOME | End: 2017-09-15
Attending: INTERNAL MEDICINE
Payer: COMMERCIAL

## 2017-09-15 DIAGNOSIS — R05.9 COUGH: ICD-10-CM

## 2017-09-15 PROCEDURE — 71020 XR CHEST PA AND LATERAL: CPT | Mod: TC

## 2017-09-15 PROCEDURE — 71020 XR CHEST PA AND LATERAL: CPT | Mod: 26,,, | Performed by: RADIOLOGY

## 2017-09-17 ENCOUNTER — PATIENT MESSAGE (OUTPATIENT)
Dept: INTERNAL MEDICINE | Facility: CLINIC | Age: 62
End: 2017-09-17

## 2017-09-20 RX ORDER — ZOLPIDEM TARTRATE 5 MG/1
5 TABLET ORAL NIGHTLY PRN
Qty: 15 TABLET | Refills: 0 | Status: SHIPPED | OUTPATIENT
Start: 2017-09-20 | End: 2019-01-25 | Stop reason: SDUPTHER

## 2017-09-21 ENCOUNTER — PATIENT MESSAGE (OUTPATIENT)
Dept: INTERNAL MEDICINE | Facility: CLINIC | Age: 62
End: 2017-09-21

## 2017-09-27 ENCOUNTER — PATIENT MESSAGE (OUTPATIENT)
Dept: ENDOCRINOLOGY | Facility: CLINIC | Age: 62
End: 2017-09-27

## 2017-11-08 RX ORDER — PREDNISONE 20 MG/1
TABLET ORAL
Qty: 10 TABLET | Refills: 0 | OUTPATIENT
Start: 2017-11-08

## 2017-12-29 ENCOUNTER — TELEPHONE (OUTPATIENT)
Dept: INTERNAL MEDICINE | Facility: CLINIC | Age: 62
End: 2017-12-29

## 2017-12-29 NOTE — TELEPHONE ENCOUNTER
----- Message from Ryanne Fernandez sent at 12/29/2017  9:51 AM CST -----  Contact: Pt request from [employer]  Appointment Request From: Martha Mcfarlane    With Provider: Nas Mcneill MD [-Primary Care Physician-]    Would Accept With:Only the person I've selected    Preferred Date Range: From 1/19/2018 To 2/2/2018    Preferred Times: Any    Reason for visit: Request an Appt    Comments:  Yearly check up. Would like early morning. Will fast so lab work can be done also while I am there from out of town.    Pt is requesting to have lab work done before visit no orders yet need orders for labs

## 2018-01-24 ENCOUNTER — LAB VISIT (OUTPATIENT)
Dept: LAB | Facility: HOSPITAL | Age: 63
End: 2018-01-24
Attending: INTERNAL MEDICINE
Payer: COMMERCIAL

## 2018-01-24 ENCOUNTER — OFFICE VISIT (OUTPATIENT)
Dept: INTERNAL MEDICINE | Facility: CLINIC | Age: 63
End: 2018-01-24
Payer: COMMERCIAL

## 2018-01-24 VITALS
HEART RATE: 66 BPM | WEIGHT: 144.63 LBS | BODY MASS INDEX: 19.59 KG/M2 | DIASTOLIC BLOOD PRESSURE: 70 MMHG | SYSTOLIC BLOOD PRESSURE: 122 MMHG | HEIGHT: 72 IN

## 2018-01-24 DIAGNOSIS — Z00.00 ROUTINE MEDICAL EXAM: ICD-10-CM

## 2018-01-24 DIAGNOSIS — Z00.00 ROUTINE PHYSICAL EXAMINATION: Primary | ICD-10-CM

## 2018-01-24 DIAGNOSIS — E21.3 HYPERPARATHYROIDISM: ICD-10-CM

## 2018-01-24 DIAGNOSIS — R82.994 HYPERCALCIURIA: ICD-10-CM

## 2018-01-24 DIAGNOSIS — M81.0 OSTEOPOROSIS WITHOUT CURRENT PATHOLOGICAL FRACTURE, UNSPECIFIED OSTEOPOROSIS TYPE: ICD-10-CM

## 2018-01-24 DIAGNOSIS — K59.09 CHRONIC CONSTIPATION: ICD-10-CM

## 2018-01-24 LAB
ALBUMIN SERPL BCP-MCNC: 4.3 G/DL
ALP SERPL-CCNC: 66 U/L
ALT SERPL W/O P-5'-P-CCNC: 22 U/L
ANION GAP SERPL CALC-SCNC: 8 MMOL/L
AST SERPL-CCNC: 27 U/L
BASOPHILS # BLD AUTO: 0.02 K/UL
BASOPHILS NFR BLD: 0.6 %
BILIRUB SERPL-MCNC: 0.6 MG/DL
BUN SERPL-MCNC: 20 MG/DL
CALCIUM SERPL-MCNC: 9.6 MG/DL
CHLORIDE SERPL-SCNC: 105 MMOL/L
CHOLEST SERPL-MCNC: 149 MG/DL
CHOLEST/HDLC SERPL: 2.7 {RATIO}
CO2 SERPL-SCNC: 30 MMOL/L
CREAT SERPL-MCNC: 0.7 MG/DL
DIFFERENTIAL METHOD: ABNORMAL
EOSINOPHIL # BLD AUTO: 0.1 K/UL
EOSINOPHIL NFR BLD: 1.4 %
ERYTHROCYTE [DISTWIDTH] IN BLOOD BY AUTOMATED COUNT: 12.9 %
EST. GFR  (AFRICAN AMERICAN): >60 ML/MIN/1.73 M^2
EST. GFR  (NON AFRICAN AMERICAN): >60 ML/MIN/1.73 M^2
GLUCOSE SERPL-MCNC: 84 MG/DL
HCT VFR BLD AUTO: 36.1 %
HDLC SERPL-MCNC: 55 MG/DL
HDLC SERPL: 36.9 %
HGB BLD-MCNC: 12.4 G/DL
LDLC SERPL CALC-MCNC: 84.8 MG/DL
LYMPHOCYTES # BLD AUTO: 1.1 K/UL
LYMPHOCYTES NFR BLD: 32.1 %
MCH RBC QN AUTO: 29.6 PG
MCHC RBC AUTO-ENTMCNC: 34.3 G/DL
MCV RBC AUTO: 86 FL
MONOCYTES # BLD AUTO: 0.2 K/UL
MONOCYTES NFR BLD: 6.8 %
NEUTROPHILS # BLD AUTO: 2.1 K/UL
NEUTROPHILS NFR BLD: 59.1 %
NONHDLC SERPL-MCNC: 94 MG/DL
PLATELET # BLD AUTO: 156 K/UL
PMV BLD AUTO: 10.6 FL
POTASSIUM SERPL-SCNC: 3.9 MMOL/L
PROT SERPL-MCNC: 7.1 G/DL
RBC # BLD AUTO: 4.19 M/UL
SODIUM SERPL-SCNC: 143 MMOL/L
TRIGL SERPL-MCNC: 46 MG/DL
WBC # BLD AUTO: 3.55 K/UL

## 2018-01-24 PROCEDURE — 80061 LIPID PANEL: CPT

## 2018-01-24 PROCEDURE — 85025 COMPLETE CBC W/AUTO DIFF WBC: CPT

## 2018-01-24 PROCEDURE — 80053 COMPREHEN METABOLIC PANEL: CPT

## 2018-01-24 PROCEDURE — 99396 PREV VISIT EST AGE 40-64: CPT | Mod: S$GLB,,, | Performed by: INTERNAL MEDICINE

## 2018-01-24 PROCEDURE — 36415 COLL VENOUS BLD VENIPUNCTURE: CPT

## 2018-01-24 PROCEDURE — 99999 PR PBB SHADOW E&M-EST. PATIENT-LVL III: CPT | Mod: PBBFAC,,, | Performed by: INTERNAL MEDICINE

## 2018-01-27 ENCOUNTER — PATIENT MESSAGE (OUTPATIENT)
Dept: INTERNAL MEDICINE | Facility: CLINIC | Age: 63
End: 2018-01-27

## 2018-01-30 ENCOUNTER — PATIENT MESSAGE (OUTPATIENT)
Dept: INTERNAL MEDICINE | Facility: CLINIC | Age: 63
End: 2018-01-30

## 2018-04-05 ENCOUNTER — PATIENT MESSAGE (OUTPATIENT)
Dept: ENDOCRINOLOGY | Facility: CLINIC | Age: 63
End: 2018-04-05

## 2018-05-31 ENCOUNTER — PATIENT MESSAGE (OUTPATIENT)
Dept: UROLOGY | Facility: CLINIC | Age: 63
End: 2018-05-31

## 2018-06-01 ENCOUNTER — PATIENT MESSAGE (OUTPATIENT)
Dept: UROLOGY | Facility: CLINIC | Age: 63
End: 2018-06-01

## 2018-06-01 DIAGNOSIS — R31.0 GROSS HEMATURIA: Primary | ICD-10-CM

## 2018-07-10 ENCOUNTER — OFFICE VISIT (OUTPATIENT)
Dept: ENDOCRINOLOGY | Facility: CLINIC | Age: 63
End: 2018-07-10
Payer: COMMERCIAL

## 2018-07-10 VITALS
BODY MASS INDEX: 19.86 KG/M2 | HEART RATE: 67 BPM | WEIGHT: 146.63 LBS | HEIGHT: 72 IN | SYSTOLIC BLOOD PRESSURE: 122 MMHG | RESPIRATION RATE: 17 BRPM | DIASTOLIC BLOOD PRESSURE: 84 MMHG

## 2018-07-10 DIAGNOSIS — M81.0 SENILE OSTEOPOROSIS: Primary | ICD-10-CM

## 2018-07-10 DIAGNOSIS — E21.3 HYPERPARATHYROIDISM: ICD-10-CM

## 2018-07-10 DIAGNOSIS — N20.0 KIDNEY STONES: ICD-10-CM

## 2018-07-10 PROCEDURE — 99999 PR PBB SHADOW E&M-EST. PATIENT-LVL III: CPT | Mod: PBBFAC,,, | Performed by: INTERNAL MEDICINE

## 2018-07-10 PROCEDURE — 99214 OFFICE O/P EST MOD 30 MIN: CPT | Mod: S$GLB,,, | Performed by: INTERNAL MEDICINE

## 2018-07-10 PROCEDURE — 3008F BODY MASS INDEX DOCD: CPT | Mod: CPTII,S$GLB,, | Performed by: INTERNAL MEDICINE

## 2018-07-10 RX ORDER — RISEDRONATE SODIUM 35 MG/1
35 TABLET, FILM COATED ORAL
Qty: 12 TABLET | Refills: 3 | Status: SHIPPED | OUTPATIENT
Start: 2018-07-10 | End: 2019-01-29

## 2018-07-10 RX ORDER — ASCORBIC ACID 500 MG
500 TABLET ORAL DAILY
COMMUNITY

## 2018-07-10 NOTE — PATIENT INSTRUCTIONS
Take one MVI daily   Take one additional calcium supplement daily   -- stop it once you run o ut    Continue the extra vitamin D 1000 IUs daily     Drink plenty of water.     Eat dark leafy greens, almonds, sardines    activia daily   Ice cream regularly

## 2018-07-10 NOTE — PROGRESS NOTES
Subjective:     Patient ID: Martha Mcfarlane is a 62 y.o. female.    Chief Complaint: No chief complaint on file.    HPI:   Ms. Mcfarlane is a 62 y.o. female who is here for a follow-up visit for evaluation of osteoporosis and hyperparathyroidism with normal calcium levels. Osteoporosis diagnosed with DXA done at [facility]. Restarted alendronate 7/2017 but stopped it September 2017 due to blood pressure issues.      Since her last visit with me, she denies falls or fractures.  Lost about 1/2 inch in height.   All three maternal aunts had stooped posture and sustained fractures of hip. Has kidney stones and follows with urologist. Has urine calcium of 265 mg/specimen    Previously tried:  Prolia 60 mg due to severe constipation  Reclast 5 mg IV once a yearly X 1 (11/2014) - severe constipation  Tried alendronate - last dose was 2014  Takes calcium 750 mg tid and vitamin D daily       Previous evaluation included mildly elevated parathyroid hormone levels (81 - 108 pg/ml) with normal calcium. Last PTH level was 77 pg/ml. Normal SPEP and vitamin D levels. Mildly elevated urine calcium.    Dietary:  Yogurt and ice cream - daily   Dark leafy greens    Supplements:  MVI  D3  Magnesium  Calcium three tablets daily   Review of Systems   Constitutional: Negative for chills and fever.   HENT: Positive for postnasal drip, sinus pain and sinus pressure. Negative for congestion.    Eyes: Negative for visual disturbance.   Respiratory: Positive for cough. Negative for chest tightness and shortness of breath.    Cardiovascular: Negative for chest pain, palpitations and leg swelling.   Gastrointestinal: Negative for abdominal pain and vomiting.   Genitourinary: Negative for dysuria.   Musculoskeletal: Negative for arthralgias.   Skin: Negative for rash.   Neurological: Negative for weakness.   Hematological: Does not bruise/bleed easily.   Psychiatric/Behavioral: Negative for sleep disturbance.        Objective:  "    Physical Exam  Vitals:    07/10/18 0913   BP: 122/84   Pulse: 67   Resp: 17   Weight: 66.5 kg (146 lb 9.7 oz)   Height: 6' 1" (1.854 m)       Results for YA TORRES (MRN [id number]) as of 7/10/2018 09:46   Ref. Range 11/14/2014 09:49 1/20/2016 12:30 1/30/2017 10:06   TSH Latest Ref Range: 0.400 - 4.000 uIU/mL 1.291 0.805 0.847   Results for YA TORRES (MRN [id number]) as of 7/10/2018 09:46   Ref. Range 7/28/2017 10:16   PTH Latest Ref Range: 9.0 - 77.0 pg/mL 77.0   Results for YA TORRES (MRN [id number]) as of 7/10/2018 09:46   Ref. Range 1/24/2018 12:26   Sodium Latest Ref Range: 136 - 145 mmol/L 143   Potassium Latest Ref Range: 3.5 - 5.1 mmol/L 3.9   Chloride Latest Ref Range: 95 - 110 mmol/L 105   CO2 Latest Ref Range: 23 - 29 mmol/L 30 (H)   Anion Gap Latest Ref Range: 8 - 16 mmol/L 8   BUN, Bld Latest Ref Range: 8 - 23 mg/dL 20   Creatinine Latest Ref Range: 0.5 - 1.4 mg/dL 0.7   eGFR if non African American Latest Ref Range: >60 mL/min/1.73 m^2 >60   eGFR if  Latest Ref Range: >60 mL/min/1.73 m^2 >60   Glucose Latest Ref Range: 70 - 110 mg/dL 84   Calcium Latest Ref Range: 8.7 - 10.5 mg/dL 9.6   Alkaline Phosphatase Latest Ref Range: 55 - 135 U/L 66   Total Protein Latest Ref Range: 6.0 - 8.4 g/dL 7.1   Albumin Latest Ref Range: 3.5 - 5.2 g/dL 4.3   Total Bilirubin Latest Ref Range: 0.1 - 1.0 mg/dL 0.6   AST Latest Ref Range: 10 - 40 U/L 27   ALT Latest Ref Range: 10 - 44 U/L 22   Triglycerides Latest Ref Range: 30 - 150 mg/dL 46     Assessment/Plan:       1. Senile osteoporosis - no falls or fractures  - weight bearing activity  - outside scan to be copied into patient's chart T score of -3.2 at total hip (LS -2.6 and FN -2.7)  - will try risedronate once very other week  - bone density in one year    - Vitamin D; Future  - Renal function panel; Future  - PTH, intact; Future    2. Kidney stones  - stop taking so much calcium - take MVI and one calcium supplement (no more than 800 " mg)  - continue dietary calcium through vegetables mostly and one activia   - Vitamin D; Future  - Renal function panel; Future  - PTH, intact; Future    3. Hyperparathyroidism  - Vitamin D; Future  - Renal function panel; Future  - PTH, intact; Future

## 2018-07-16 ENCOUNTER — HOSPITAL ENCOUNTER (OUTPATIENT)
Dept: UROLOGY | Facility: HOSPITAL | Age: 63
Discharge: HOME | End: 2018-07-16
Attending: UROLOGY
Payer: COMMERCIAL

## 2018-07-16 VITALS
DIASTOLIC BLOOD PRESSURE: 73 MMHG | SYSTOLIC BLOOD PRESSURE: 178 MMHG | BODY MASS INDEX: 19.2 KG/M2 | TEMPERATURE: 99 F | RESPIRATION RATE: 18 BRPM | HEART RATE: 65 BPM | WEIGHT: 145.5 LBS

## 2018-07-16 DIAGNOSIS — R31.0 GROSS HEMATURIA: ICD-10-CM

## 2018-07-16 PROCEDURE — 52000 CYSTOURETHROSCOPY: CPT | Mod: ,,, | Performed by: UROLOGY

## 2018-07-16 PROCEDURE — 52000 CYSTOURETHROSCOPY: CPT

## 2018-07-16 RX ORDER — LIDOCAINE HYDROCHLORIDE 20 MG/ML
JELLY TOPICAL ONCE
Status: COMPLETED | OUTPATIENT
Start: 2018-07-16 | End: 2018-07-16

## 2018-07-16 RX ORDER — CIPROFLOXACIN 500 MG/1
500 TABLET ORAL ONCE
Status: COMPLETED | OUTPATIENT
Start: 2018-07-16 | End: 2018-07-16

## 2018-07-16 RX ADMIN — LIDOCAINE HYDROCHLORIDE: 20 JELLY TOPICAL at 01:07

## 2018-07-16 RX ADMIN — CIPROFLOXACIN 500 MG: 500 TABLET ORAL at 01:07

## 2018-07-16 NOTE — PATIENT INSTRUCTIONS
What to Expect After a Cystoscopy  For the next 24-48 hours, you may feel a mild burning when you urinate. This burning is normal and expected. Drink plenty of water to dilute the urine to help relieve the burning sensation. You may also see a small amount of blood in your urine after the procedure.    Unless you are already taking antibiotics, you may be given an antibiotic after the test to prevent infection.    Signs and Symptoms to Report  Call the [facility] at [phone] if you develop any of the following:  · Fever of 101 degrees or higher  · Chills or persistent bleeding  · Inability to urinate

## 2018-07-16 NOTE — OP NOTE
DATE OF PROCEDURE:  07/16/2018.    PREOPERATIVE DIAGNOSIS:  Gross hematuria.    POSTOPERATIVE DIAGNOSES:  Gross hematuria and bilateral renal stones.    OPERATION PERFORMED:  Flexible cystoscopy.    PROCEDURE IN DETAIL:  This patient was seen in 2016, had a CT scan demonstrating   bilateral renal stones, 4 on the right and 6 on the left.  She had an episode   of hematuria and her hematuria cleared, so she did not come back until 2 months   ago when she had an episode of gross hematuria and we scheduled for cystoscopy.    The patient thought she might have passed a stone at that time.  The patient   was prepped and draped in dorsal supine position.  Xylocaine jelly was instilled   per urethra.  Urethra was normal without diverticula.  Bladder neck was normal.    Both ureteral orifices were normal size, shape and position with clear efflux.    There were no stones, tumors, foreign bodies or active infections noted.    IMPRESSION:  Resolved gross hematuria.  Today's urine was clear and the patient   has a history of stones.    RECOMMENDATIONS:  KUB and can do ESWL p.r.n.  The patient has evidence of   medullary sponge kidney and we will phone review KUB and follow up the patient's   urines.  I will see her back in 4 months for urinalysis or sooner p.r.n.      JF  dd: 07/16/2018 13:25:11 (CDT)  td: 07/16/2018 14:08:56 (CDT)  Doc ID   #[id number]  Job ID #[id number]    CC:

## 2018-08-27 ENCOUNTER — HOSPITAL ENCOUNTER (OUTPATIENT)
Dept: RADIOLOGY | Facility: HOSPITAL | Age: 63
Discharge: HOME | End: 2018-08-27
Attending: UROLOGY
Payer: COMMERCIAL

## 2018-08-27 ENCOUNTER — TELEPHONE (OUTPATIENT)
Dept: UROLOGY | Facility: CLINIC | Age: 63
End: 2018-08-27

## 2018-08-27 DIAGNOSIS — R31.0 GROSS HEMATURIA: ICD-10-CM

## 2018-08-27 PROCEDURE — 74018 RADEX ABDOMEN 1 VIEW: CPT | Mod: TC

## 2018-08-27 PROCEDURE — 74018 RADEX ABDOMEN 1 VIEW: CPT | Mod: 26,,, | Performed by: RADIOLOGY

## 2018-08-27 NOTE — TELEPHONE ENCOUNTER
----- Message from Tesfaye Garrett Jr., MD sent at 8/27/2018 12:19 PM CDT -----  Tiny gee renal stones

## 2018-12-11 ENCOUNTER — TELEPHONE (OUTPATIENT)
Dept: INTERNAL MEDICINE | Facility: CLINIC | Age: 63
End: 2018-12-11

## 2019-01-25 ENCOUNTER — IMMUNIZATION (OUTPATIENT)
Dept: PHARMACY | Facility: CLINIC | Age: 64
End: 2019-01-25
Payer: COMMERCIAL

## 2019-01-25 ENCOUNTER — CLINICAL SUPPORT (OUTPATIENT)
Dept: INTERNAL MEDICINE | Facility: CLINIC | Age: 64
End: 2019-01-25
Payer: COMMERCIAL

## 2019-01-25 ENCOUNTER — OFFICE VISIT (OUTPATIENT)
Dept: INTERNAL MEDICINE | Facility: CLINIC | Age: 64
End: 2019-01-25
Payer: COMMERCIAL

## 2019-01-25 ENCOUNTER — HOSPITAL ENCOUNTER (OUTPATIENT)
Dept: RADIOLOGY | Facility: HOSPITAL | Age: 64
Discharge: HOME | End: 2019-01-25
Attending: INTERNAL MEDICINE
Payer: COMMERCIAL

## 2019-01-25 VITALS
BODY MASS INDEX: 18.79 KG/M2 | WEIGHT: 142.44 LBS | DIASTOLIC BLOOD PRESSURE: 68 MMHG | HEART RATE: 58 BPM | SYSTOLIC BLOOD PRESSURE: 128 MMHG | OXYGEN SATURATION: 99 %

## 2019-01-25 DIAGNOSIS — Z00.00 ROUTINE PHYSICAL EXAMINATION: Primary | ICD-10-CM

## 2019-01-25 DIAGNOSIS — G47.00 INSOMNIA, UNSPECIFIED TYPE: ICD-10-CM

## 2019-01-25 DIAGNOSIS — E21.3 HYPERPARATHYROIDISM: ICD-10-CM

## 2019-01-25 DIAGNOSIS — K59.09 CHRONIC CONSTIPATION: ICD-10-CM

## 2019-01-25 DIAGNOSIS — C43.9 MALIGNANT MELANOMA, UNSPECIFIED SITE: ICD-10-CM

## 2019-01-25 DIAGNOSIS — M79.671 RIGHT FOOT PAIN: ICD-10-CM

## 2019-01-25 DIAGNOSIS — S99.921A INJURY OF RIGHT FOOT, INITIAL ENCOUNTER: ICD-10-CM

## 2019-01-25 DIAGNOSIS — R32 URINARY INCONTINENCE, UNSPECIFIED TYPE: ICD-10-CM

## 2019-01-25 DIAGNOSIS — M81.0 OSTEOPOROSIS WITHOUT CURRENT PATHOLOGICAL FRACTURE, UNSPECIFIED OSTEOPOROSIS TYPE: ICD-10-CM

## 2019-01-25 PROCEDURE — 73630 X-RAY EXAM OF FOOT: CPT | Mod: TC,RT

## 2019-01-25 PROCEDURE — 99999 PR PBB SHADOW E&M-EST. PATIENT-LVL IV: CPT | Mod: PBBFAC,,, | Performed by: INTERNAL MEDICINE

## 2019-01-25 PROCEDURE — 99999 PR PBB SHADOW E&M-EST. PATIENT-LVL IV: ICD-10-PCS | Mod: PBBFAC,,, | Performed by: INTERNAL MEDICINE

## 2019-01-25 PROCEDURE — 99999 PR PBB SHADOW E&M-EST. PATIENT-LVL I: CPT | Mod: PBBFAC,,,

## 2019-01-25 PROCEDURE — 90715 TDAP VACCINE GREATER THAN OR EQUAL TO 7YO IM: ICD-10-PCS | Mod: S$GLB,,, | Performed by: INTERNAL MEDICINE

## 2019-01-25 PROCEDURE — 90471 TDAP VACCINE GREATER THAN OR EQUAL TO 7YO IM: ICD-10-PCS | Mod: S$GLB,,, | Performed by: INTERNAL MEDICINE

## 2019-01-25 PROCEDURE — 99396 PREV VISIT EST AGE 40-64: CPT | Mod: S$GLB,,, | Performed by: INTERNAL MEDICINE

## 2019-01-25 PROCEDURE — 90471 IMMUNIZATION ADMIN: CPT | Mod: S$GLB,,, | Performed by: INTERNAL MEDICINE

## 2019-01-25 PROCEDURE — 99396 PR PREVENTIVE VISIT,EST,40-64: ICD-10-PCS | Mod: S$GLB,,, | Performed by: INTERNAL MEDICINE

## 2019-01-25 PROCEDURE — 90715 TDAP VACCINE 7 YRS/> IM: CPT | Mod: S$GLB,,, | Performed by: INTERNAL MEDICINE

## 2019-01-25 PROCEDURE — 99999 PR PBB SHADOW E&M-EST. PATIENT-LVL I: ICD-10-PCS | Mod: PBBFAC,,,

## 2019-01-25 PROCEDURE — 73630 XR FOOT COMPLETE 3 VIEW RIGHT: ICD-10-PCS | Mod: 26,RT,, | Performed by: RADIOLOGY

## 2019-01-25 PROCEDURE — 73630 X-RAY EXAM OF FOOT: CPT | Mod: 26,RT,, | Performed by: RADIOLOGY

## 2019-01-25 RX ORDER — ZOLPIDEM TARTRATE 5 MG/1
5 TABLET ORAL NIGHTLY PRN
Qty: 30 TABLET | Refills: 1 | Status: SHIPPED | OUTPATIENT
Start: 2019-01-25 | End: 2019-10-23 | Stop reason: SDUPTHER

## 2019-01-25 NOTE — PROGRESS NOTES
Subjective:       Patient ID: Martha Mcfarlane is a 63 y.o. female.    Chief Complaint: Annual Exam    HPI  Review of Systems   Constitutional: Negative for activity change and unexpected weight change.   HENT: Negative for hearing loss, rhinorrhea and trouble swallowing.    Eyes: Negative for discharge and visual disturbance.   Respiratory: Negative for chest tightness and wheezing.    Cardiovascular: Negative for chest pain and palpitations.   Gastrointestinal: Positive for constipation. Negative for blood in stool, diarrhea and vomiting.   Endocrine: Positive for polyuria. Negative for polydipsia.   Genitourinary: Negative for difficulty urinating, dysuria, hematuria and menstrual problem.   Musculoskeletal: Positive for arthralgias. Negative for gait problem (no pain when walking), joint swelling and neck pain.   Neurological: Negative for weakness and headaches.   Psychiatric/Behavioral: Negative for confusion and dysphoric mood.       Objective:      Physical Exam   Constitutional: She is oriented to person, place, and time. She appears well-developed and well-nourished. No distress.   HENT:   Head: Normocephalic and atraumatic.   Right Ear: External ear normal.   Left Ear: External ear normal.   Mouth/Throat: Oropharynx is clear and moist. No oropharyngeal exudate.   Eyes: Conjunctivae are normal. Pupils are equal, round, and reactive to light. No scleral icterus.   Neck: Normal range of motion. Neck supple. No thyromegaly present.   Cardiovascular: Normal rate and regular rhythm.   No murmur heard.  Pulmonary/Chest: Effort normal and breath sounds normal. She has no wheezes.   Abdominal: Soft. Bowel sounds are normal. She exhibits no distension. There is no tenderness.   Musculoskeletal: She exhibits tenderness (right foot, 3rd metatarsal).   Lymphadenopathy:     She has no cervical adenopathy.   Neurological: She is alert and oriented to person, place, and time.   Skin: No rash noted.   Psychiatric: She has a  normal mood and affect.       Assessment:       1. Routine physical examination    2. Insomnia, unspecified type    3. Chronic constipation    4. Hyperparathyroidism    5. Malignant melanoma, unspecified site    6. Right foot pain    7. Injury of right foot, initial encounter    8. Osteoporosis without current pathological fracture, unspecified osteoporosis type    9. Urinary incontinence, unspecified type        Plan:       Martha LARA was seen today for annual exam.    Diagnoses and all orders for this visit:    Routine physical examination    Insomnia, unspecified type    Chronic constipation    Hyperparathyroidism    Malignant melanoma, unspecified site    Right foot pain  -     X-Ray Foot Complete Right; Future    Injury of right foot, initial encounter  -     X-Ray Foot Complete Right; Future    Osteoporosis without current pathological fracture, unspecified osteoporosis type    Urinary incontinence, unspecified type  -     Urine culture; Future    Other orders  -     zolpidem (AMBIEN) 5 MG Tab; Take 1 tablet (5 mg total) by mouth nightly as needed.

## 2019-01-25 NOTE — PROGRESS NOTES
Subjective:       Patient ID: Martha Mcfarlane is a 63 y.o. female.    Chief Complaint: Annual Exam    HPI:  Patient in for annual exam-patient seen in conjunction with medical student Ovidio Perez.     Ms. Martha Mcfarlane is a 63 year old woman presenting for her annual check up h/o hyperparathyroidism, renal calculi, MVP, osteoporosis.  She is followed by endocrinology.  She is due for Tdap, pneumococcal vaccines at this time. [facility], DEXA scans and pap smears for which she is currently up to date. She is utd on her lipids and with here colonoscpoy.      Pt has a history of hyperparathyroidism and kidney stones and is followed by urology and endocrinology. This year she has had intermittent episodes of dysuria and hematuria and when she saw urology the cystocope showed small kidney stones that she has passed on her own. She does complain of some worsening incontinence with urgency and wonders if she has an infection.      Pt also reports worsening R dorsal forefoot pain since dropping a frozen chicken on her foot the day before thanksgiving. It has progressed to the point where she can't fall asleep. She states she is still able to function during the day but that it is worst at night. She has taken some naprosyn for this issue with minimal relief. She has a trip to [place] in 2 weeks.  She had a fracture of the 5th metatarsal many years ago but this is more in middle of the foot.        Review of Systems   Constitutional: Negative for activity change, appetite change, chills, fever and unexpected weight change.   HENT: Negative for hearing loss, nosebleeds, rhinorrhea, sinus pain, sore throat and trouble swallowing.    Eyes: Negative for discharge and visual disturbance.   Respiratory: Negative for cough, chest tightness, shortness of breath and wheezing.    Cardiovascular: Negative for chest pain, palpitations and leg swelling.   Gastrointestinal:  Positive for constipation. Negative for abdominal pain, blood in stool, diarrhea and vomiting.   Endocrine: Positive for polyuria. Negative for polydipsia.   Genitourinary: Negative for difficulty urinating, dysuria, hematuria and menstrual problem.   Musculoskeletal: Positive for arthralgias (right foot). Negative for joint swelling, neck pain and neck stiffness.   Skin: Negative for pallor and rash.   Neurological: Negative for weakness and headaches.   Psychiatric/Behavioral: Negative for confusion, dysphoric mood and suicidal ideas. The patient is not nervous/anxious.        Objective:      Physical Exam   Constitutional: She is oriented to person, place, and time. She appears well-developed and well-nourished. No distress.   HENT:   Head: Normocephalic and atraumatic.   Right Ear: External ear normal.   Left Ear: External ear normal.   Mouth/Throat: Oropharynx is clear and moist. No oropharyngeal exudate.   Eyes: Conjunctivae are normal. Pupils are equal, round, and reactive to light. No scleral icterus.   Neck: Normal range of motion. Neck supple. No thyromegaly present.   Cardiovascular: Normal rate and regular rhythm.   No murmur heard.  Pulmonary/Chest: Effort normal and breath sounds normal. She has no wheezes.   Abdominal: Soft. Bowel sounds are normal. She exhibits no distension. There is no tenderness.   Musculoskeletal: She exhibits tenderness (Tender dorsum of the foot distally just beneath 3rd toe).   Lymphadenopathy:     She has no cervical adenopathy.   Neurological: She is alert and oriented to person, place, and time.   Skin: No rash noted.   Psychiatric: She has a normal mood and affect.       Assessment:       1. Routine physical examination    2. Insomnia, unspecified type    3. Chronic constipation    4. Hyperparathyroidism    5. Malignant melanoma, unspecified site    6. Right foot pain    7. Injury of right foot, initial encounter    8. Osteoporosis without current pathological fracture,  unspecified osteoporosis type    9. Urinary incontinence, unspecified type        Plan:       Martha LARA was seen today for annual exam.    Diagnoses and all orders for this visit:    Routine physical examination    Insomnia, unspecified type    Chronic constipation    Hyperparathyroidism    Malignant melanoma, unspecified site    Right foot pain  -     X-Ray Foot Complete Right; Future    Injury of right foot, initial encounter  -     X-Ray Foot Complete Right; Future    Osteoporosis without current pathological fracture, unspecified osteoporosis type    Urinary incontinence, unspecified type  -     Urine culture; Future    Other orders  -     zolpidem (AMBIEN) 5 MG Tab; Take 1 tablet (5 mg total) by mouth nightly as needed.            Addendum:  X-ray showed the old fracture 5th metatarsal but nothing new at the 2nd 3rd or 4th.  Will offer Podiatry evaluation.

## 2019-01-25 NOTE — MEDICAL/APP STUDENT
Subjective:       Patient ID: Martha Mcfarlane is a 63 y.o. female.    Chief Complaint: Annual Exam    HPI Ms. Martha Mcfarlane is a 63 year old woman presenting for her annual check up h/o hyperparathyroidism, renal calculi, MVP, osteoporosis. She is due for Tdap, pneumococcal vaccines at this time. She is following with her OBGYN at [facility] who arranges her mammogram, DEXA scans and pap smears for which she is currently up to date. She is utd on her lipids and with here colonoscpoy.     Pt has a history of hyperparathyroidism and kidney stones and is followed by urology. This year she has had intermittent episodes of dysuria and hematuria and when she saw urology the cystocope showed small kidney stones that she has passed on her own. She does complain of some worsening incontinence with urgency and wonders if she has an infection.     Pt also reports worsening R dorsal forefoot pain since dropping a frozen chicken on her foot the day before thanksgiving. It has progressed to the point where she cant fall asleep. She states she is still able to function during the day but that it is worst at night. She has taken some naprosyn for this issue with minimal relief. She has a trip to [place] in 2 weeks.     Pt reports no changes in family or social history over the past year, and is taking all prescribed medications as directed.   Review of Systems   Constitutional: Negative for activity change, appetite change, chills, fever and unexpected weight change.   HENT: Negative.    Eyes: Negative for pain, discharge, redness and itching.   Respiratory: Negative for cough and shortness of breath.    Cardiovascular: Negative.    Gastrointestinal: Negative.    Endocrine: Negative.    Genitourinary: Positive for dysuria, frequency, hematuria and urgency.   Musculoskeletal: Positive for arthralgias and gait problem.   Skin: Negative for rash and wound.   Allergic/Immunologic: Negative.    Hematological: Negative.     Psychiatric/Behavioral: Negative.        Objective:      Physical Exam   Constitutional: She is oriented to person, place, and time. She appears well-developed and well-nourished. No distress.   HENT:   Head: Normocephalic and atraumatic.   Eyes: Conjunctivae are normal. Right eye exhibits no discharge. Left eye exhibits no discharge. No scleral icterus.   Cardiovascular: Normal rate, regular rhythm and normal heart sounds.   Pulmonary/Chest: Effort normal. No respiratory distress.   Musculoskeletal: Normal range of motion. She exhibits tenderness ( dorsal [id number]).        Right foot: There is deformity ( pes cavus).        Left foot: There is deformity ( pes cavus).        Feet:    Tenderness at indicated area   Feet:   Right Foot:   Skin Integrity: Negative for erythema or warmth.   Left Foot:   Skin Integrity: Negative for erythema or warmth.   Neurological: She is alert and oriented to person, place, and time.   Skin: Skin is warm and dry.   Psychiatric: She has a normal mood and affect. Her behavior is normal. Judgment and thought content normal.   Vitals reviewed.      Assessment:       1. Routine physical examination    2. Insomnia, unspecified type    3. Chronic constipation    4. Hyperparathyroidism    5. Malignant melanoma, unspecified site    6. Right foot pain    7. Injury of right foot, initial encounter    8. Osteoporosis without current pathological fracture, unspecified osteoporosis type    9. Urinary incontinence, unspecified type        Plan:       R foot pain  - XR R foot ordered, will f/u with results  - continue with naprosyn as needed    Urinary incontinence  - progressive, continue to monitor  - UCx to rule out infection    Osteoporosis  - DEXA scans completed at [facility] next year  - currently managing through diet, supplements    Insomnia  - related to foot pain  - zolpidem 5mg PRN nocte    Health Maintenance  - will receive Tdap and PSV23 today

## 2019-01-29 ENCOUNTER — PATIENT MESSAGE (OUTPATIENT)
Dept: INTERNAL MEDICINE | Facility: CLINIC | Age: 64
End: 2019-01-29

## 2019-01-29 ENCOUNTER — OFFICE VISIT (OUTPATIENT)
Dept: INTERNAL MEDICINE | Facility: CLINIC | Age: 64
End: 2019-01-29
Payer: COMMERCIAL

## 2019-01-29 ENCOUNTER — HOSPITAL ENCOUNTER (OUTPATIENT)
Dept: RADIOLOGY | Facility: HOSPITAL | Age: 64
Discharge: HOME | End: 2019-01-29
Attending: INTERNAL MEDICINE
Payer: COMMERCIAL

## 2019-01-29 VITALS
HEART RATE: 70 BPM | BODY MASS INDEX: 19.17 KG/M2 | DIASTOLIC BLOOD PRESSURE: 84 MMHG | OXYGEN SATURATION: 99 % | WEIGHT: 141.56 LBS | SYSTOLIC BLOOD PRESSURE: 140 MMHG | HEIGHT: 72 IN

## 2019-01-29 DIAGNOSIS — L89.899 PRESSURE INJURY OF SKIN OF DORSUM OF LEFT FOOT, UNSPECIFIED INJURY STAGE: ICD-10-CM

## 2019-01-29 DIAGNOSIS — M79.672 LEFT FOOT PAIN: ICD-10-CM

## 2019-01-29 DIAGNOSIS — M79.672 LEFT FOOT PAIN: Primary | ICD-10-CM

## 2019-01-29 DIAGNOSIS — S97.82XA CRUSH INJURY OF LEFT FOOT, INITIAL ENCOUNTER: Primary | ICD-10-CM

## 2019-01-29 DIAGNOSIS — S99.922A INJURY OF LEFT FOOT, INITIAL ENCOUNTER: ICD-10-CM

## 2019-01-29 PROCEDURE — 99999 PR PBB SHADOW E&M-EST. PATIENT-LVL III: ICD-10-PCS | Mod: PBBFAC,,, | Performed by: NURSE PRACTITIONER

## 2019-01-29 PROCEDURE — 99213 OFFICE O/P EST LOW 20 MIN: CPT | Mod: S$GLB,,, | Performed by: NURSE PRACTITIONER

## 2019-01-29 PROCEDURE — 73630 X-RAY EXAM OF FOOT: CPT | Mod: TC,LT

## 2019-01-29 PROCEDURE — 99213 PR OFFICE/OUTPT VISIT, EST, LEVL III, 20-29 MIN: ICD-10-PCS | Mod: S$GLB,,, | Performed by: NURSE PRACTITIONER

## 2019-01-29 PROCEDURE — 73630 X-RAY EXAM OF FOOT: CPT | Mod: 26,LT,, | Performed by: RADIOLOGY

## 2019-01-29 PROCEDURE — 99999 PR PBB SHADOW E&M-EST. PATIENT-LVL III: CPT | Mod: PBBFAC,,, | Performed by: NURSE PRACTITIONER

## 2019-01-29 PROCEDURE — 73630 XR FOOT COMPLETE 3 VIEW LEFT: ICD-10-PCS | Mod: 26,LT,, | Performed by: RADIOLOGY

## 2019-01-29 RX ORDER — SULFAMETHOXAZOLE AND TRIMETHOPRIM 800; 160 MG/1; MG/1
1 TABLET ORAL 2 TIMES DAILY
Qty: 14 TABLET | Refills: 0 | Status: SHIPPED | OUTPATIENT
Start: 2019-01-29 | End: 2019-01-29 | Stop reason: SDUPTHER

## 2019-01-29 RX ORDER — SULFAMETHOXAZOLE AND TRIMETHOPRIM 800; 160 MG/1; MG/1
1 TABLET ORAL 2 TIMES DAILY
Qty: 14 TABLET | Refills: 0 | Status: SHIPPED | OUTPATIENT
Start: 2019-01-29 | End: 2019-02-05

## 2019-01-29 NOTE — PATIENT INSTRUCTIONS
RICE, rest, ice for the first 2 days, then alternate with heat    May continue Naproxen or Aleve as needed for pain, but not both, make sure to take with food to avoid GI upset      Crush Injury of the Foot, No Fracture  A crush injury to your foot causes local pain, swelling, and sometimes bruising. There are no broken bones. This injury takes from a few days to a few weeks to heal. If the toenail has been severely injured, it may fall off in 1 to 2 weeks. A new one will usually start to grow back within a month.  Home care  The following guidelines will help you care for your wound at home:  · You may be given a splint, cast, shoe, or boot to prevent movement at the injury. Unless you were told otherwise, use crutches or a walker and dont bear weight on the injured foot until cleared by your doctor to do so. (Crutches and walkers can be rented at many pharmacies and surgical/orthopedic supply stores). Dont put weight on a splint, or it will break.  · Keep your leg elevated to reduce pain and swelling. When sleeping, place a pillow under the injured leg. When sitting, support the injured leg so it is level with your waist. This is very important during the first 2 days (48 hours).  · Put an ice pack on the injured area. Do this for 20 minutes every 1 to 2 hours the first day for pain relief. You can make an ice pack by wrapping a plastic bag of ice cubes in a thin towel. As the ice melts, be careful that the splint/cast/boot/shoe doesnt get wet. Continue using the ice pack 3 to 4 times a day until the pain and swelling go away.  · You may use acetaminophen or ibuprofen to control pain, unless another pain medicine was prescribed. If you have chronic liver or kidney disease, talk with your health care provider before using these medicines. Also talk with your provider if youve had a stomach ulcer or GI bleeding.  · Keep the splint/cast/boot/shoe dry. When bathing, protect it with a large plastic bag,  rubber-banded at the top end. If a fiberglass splint/cast or boot gets wet, you can dry it with a hair dryer. Unless told otherwise, you can take off the boot or shoe to bathe.  · If your injury includes exposed cuts or scrapes, clean these daily with soap and water. Apply antibiotic ointment. Watch for the signs of infection listed below.  Follow-up care  Follow up with your doctor, or as advised. Return sooner if you dont start to get better within the next 3 days. If you were given a splint, it may be changed to a cast or boot at your follow-up visit.  X-rays will be checked by a radiologist. You will be told of any new findings that may affect your care.  When to seek medical advice  Call your health care provider right away if any of the following occur:  · The cast cracks  · The plaster cast or splint becomes wet or soft  · The fiberglass cast or splint remains wet for more than 24 hours  ·  Bad odor from the cast or wound-fluid stains the cast  · Increased tightness or pain under the cast or splint  · Toes become swollen, cold, blue, numb, or tingly  · Redness, warmth, swelling, drainage from the wound, or foul odor from a cast or splint  · You cant move your toes  · The skin around the cast becomes red  · Fever of 101ºF (38.3ºC) or higher, or as directed by your health care provider  Date Last Reviewed: 2/15/2015  © 9486-6194 [employer]. [address] [place] [address], [place] [address]. All rights reserved. This information is not intended as a substitute for professional medical care. Always follow your healthcare professional's instructions.
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
## 2019-01-29 NOTE — TELEPHONE ENCOUNTER
Pt is requesting an xray because she injured her left foot. Pt is scheduled to see Larisa Faye at 4pm. Pt would like to have xray prior to her appointment.

## 2019-03-08 DIAGNOSIS — Z12.39 BREAST CANCER SCREENING: ICD-10-CM

## 2019-07-23 ENCOUNTER — PATIENT MESSAGE (OUTPATIENT)
Dept: ADMINISTRATIVE | Facility: HOSPITAL | Age: 64
End: 2019-07-23

## 2019-07-23 ENCOUNTER — PATIENT OUTREACH (OUTPATIENT)
Dept: ADMINISTRATIVE | Facility: HOSPITAL | Age: 64
End: 2019-07-23

## 2019-07-23 DIAGNOSIS — M89.9 DISORDER OF BONE AND ARTICULAR CARTILAGE: Primary | ICD-10-CM

## 2019-07-23 DIAGNOSIS — M94.9 DISORDER OF BONE AND ARTICULAR CARTILAGE: Primary | ICD-10-CM

## 2019-07-23 NOTE — PROGRESS NOTES
OBTAINING [facility] REPORT DONE FEB 2018 FROM [facility]- MMG DONE Q2 YRS.  PATIENT TO SCHEDULE DEXA SCAN.

## 2019-10-23 ENCOUNTER — PATIENT MESSAGE (OUTPATIENT)
Dept: INTERNAL MEDICINE | Facility: CLINIC | Age: 64
End: 2019-10-23

## 2019-10-23 ENCOUNTER — IMMUNIZATION (OUTPATIENT)
Dept: PHARMACY | Facility: CLINIC | Age: 64
End: 2019-10-23
Payer: MEDICARE

## 2019-10-23 ENCOUNTER — LAB VISIT (OUTPATIENT)
Dept: LAB | Facility: HOSPITAL | Age: 64
End: 2019-10-23
Attending: INTERNAL MEDICINE
Payer: MEDICARE

## 2019-10-23 ENCOUNTER — OFFICE VISIT (OUTPATIENT)
Dept: INTERNAL MEDICINE | Facility: CLINIC | Age: 64
End: 2019-10-23
Payer: MEDICARE

## 2019-10-23 VITALS
HEART RATE: 76 BPM | DIASTOLIC BLOOD PRESSURE: 76 MMHG | SYSTOLIC BLOOD PRESSURE: 136 MMHG | WEIGHT: 138.88 LBS | BODY MASS INDEX: 18.32 KG/M2

## 2019-10-23 DIAGNOSIS — K59.09 CHRONIC CONSTIPATION: ICD-10-CM

## 2019-10-23 DIAGNOSIS — Z00.00 ROUTINE PHYSICAL EXAMINATION: ICD-10-CM

## 2019-10-23 DIAGNOSIS — N20.0 RENAL STONES: ICD-10-CM

## 2019-10-23 DIAGNOSIS — C43.9 MALIGNANT MELANOMA, UNSPECIFIED SITE: ICD-10-CM

## 2019-10-23 DIAGNOSIS — E21.3 HYPERPARATHYROIDISM: ICD-10-CM

## 2019-10-23 DIAGNOSIS — K59.00 CONSTIPATION, UNSPECIFIED CONSTIPATION TYPE: ICD-10-CM

## 2019-10-23 DIAGNOSIS — R35.0 FREQUENCY OF MICTURITION: ICD-10-CM

## 2019-10-23 DIAGNOSIS — G47.00 INSOMNIA, UNSPECIFIED TYPE: ICD-10-CM

## 2019-10-23 DIAGNOSIS — Z20.5 EXPOSURE TO HEPATITIS: ICD-10-CM

## 2019-10-23 DIAGNOSIS — M81.0 SENILE OSTEOPOROSIS: ICD-10-CM

## 2019-10-23 DIAGNOSIS — G47.00 INSOMNIA, UNSPECIFIED TYPE: Primary | ICD-10-CM

## 2019-10-23 LAB
ALBUMIN SERPL BCP-MCNC: 4.5 G/DL (ref 3.5–5.2)
ALP SERPL-CCNC: 71 U/L (ref 55–135)
ALT SERPL W/O P-5'-P-CCNC: 16 U/L (ref 10–44)
ANION GAP SERPL CALC-SCNC: 9 MMOL/L (ref 8–16)
AST SERPL-CCNC: 19 U/L (ref 10–40)
BASOPHILS # BLD AUTO: 0.01 K/UL (ref 0–0.2)
BASOPHILS NFR BLD: 0.3 % (ref 0–1.9)
BILIRUB SERPL-MCNC: 0.8 MG/DL (ref 0.1–1)
BUN SERPL-MCNC: 17 MG/DL (ref 8–23)
CALCIUM SERPL-MCNC: 9.2 MG/DL (ref 8.7–10.5)
CHLORIDE SERPL-SCNC: 104 MMOL/L (ref 95–110)
CO2 SERPL-SCNC: 29 MMOL/L (ref 23–29)
CREAT SERPL-MCNC: 0.7 MG/DL (ref 0.5–1.4)
DIFFERENTIAL METHOD: ABNORMAL
EOSINOPHIL # BLD AUTO: 0.1 K/UL (ref 0–0.5)
EOSINOPHIL NFR BLD: 2.2 % (ref 0–8)
ERYTHROCYTE [DISTWIDTH] IN BLOOD BY AUTOMATED COUNT: 13.2 % (ref 11.5–14.5)
EST. GFR  (AFRICAN AMERICAN): >60 ML/MIN/1.73 M^2
EST. GFR  (NON AFRICAN AMERICAN): >60 ML/MIN/1.73 M^2
GLUCOSE SERPL-MCNC: 84 MG/DL (ref 70–110)
HBV SURFACE AB SER-ACNC: NEGATIVE M[IU]/ML
HCT VFR BLD AUTO: 37.3 % (ref 37–48.5)
HGB BLD-MCNC: 12.5 G/DL (ref 12–16)
LYMPHOCYTES # BLD AUTO: 1 K/UL (ref 1–4.8)
LYMPHOCYTES NFR BLD: 27.9 % (ref 18–48)
MCH RBC QN AUTO: 28.8 PG (ref 27–31)
MCHC RBC AUTO-ENTMCNC: 33.5 G/DL (ref 32–36)
MCV RBC AUTO: 86 FL (ref 82–98)
MONOCYTES # BLD AUTO: 0.3 K/UL (ref 0.3–1)
MONOCYTES NFR BLD: 7.9 % (ref 4–15)
NEUTROPHILS # BLD AUTO: 2.3 K/UL (ref 1.8–7.7)
NEUTROPHILS NFR BLD: 61.7 % (ref 38–73)
PLATELET # BLD AUTO: 164 K/UL (ref 150–350)
PMV BLD AUTO: 10 FL (ref 9.2–12.9)
POTASSIUM SERPL-SCNC: 4 MMOL/L (ref 3.5–5.1)
PROT SERPL-MCNC: 7.3 G/DL (ref 6–8.4)
RBC # BLD AUTO: 4.34 M/UL (ref 4–5.4)
SODIUM SERPL-SCNC: 142 MMOL/L (ref 136–145)
WBC # BLD AUTO: 3.69 K/UL (ref 3.9–12.7)

## 2019-10-23 PROCEDURE — 99999 PR PBB SHADOW E&M-EST. PATIENT-LVL III: CPT | Mod: PBBFAC,,, | Performed by: INTERNAL MEDICINE

## 2019-10-23 PROCEDURE — 80053 COMPREHEN METABOLIC PANEL: CPT

## 2019-10-23 PROCEDURE — 36415 COLL VENOUS BLD VENIPUNCTURE: CPT

## 2019-10-23 PROCEDURE — 99213 OFFICE O/P EST LOW 20 MIN: CPT | Mod: PBBFAC | Performed by: INTERNAL MEDICINE

## 2019-10-23 PROCEDURE — 99999 PR PBB SHADOW E&M-EST. PATIENT-LVL III: ICD-10-PCS | Mod: PBBFAC,,, | Performed by: INTERNAL MEDICINE

## 2019-10-23 PROCEDURE — 85025 COMPLETE CBC W/AUTO DIFF WBC: CPT

## 2019-10-23 PROCEDURE — 99214 PR OFFICE/OUTPT VISIT, EST, LEVL IV, 30-39 MIN: ICD-10-PCS | Mod: S$PBB,,, | Performed by: INTERNAL MEDICINE

## 2019-10-23 PROCEDURE — 86706 HEP B SURFACE ANTIBODY: CPT

## 2019-10-23 PROCEDURE — 99214 OFFICE O/P EST MOD 30 MIN: CPT | Mod: S$PBB,,, | Performed by: INTERNAL MEDICINE

## 2019-10-23 RX ORDER — ZOLPIDEM TARTRATE 5 MG/1
5 TABLET ORAL NIGHTLY PRN
Qty: 30 TABLET | Refills: 1 | Status: SHIPPED | OUTPATIENT
Start: 2019-10-23 | End: 2020-10-26 | Stop reason: SDUPTHER

## 2019-10-23 NOTE — PROGRESS NOTES
Subjective:       Patient ID: Martha Mcfarlane is a 64 y.o. female.    Chief Complaint: Insomnia (Uses Melatonin and Ambien off and on. )    HPI: Pt here for follow up insomnia and frequency .  Patient is new to Medicare.  She sees Endocrinology for hyperparathyroidism, history of renal stones and osteoporosis.  She would like to update labs.  She is also having more frequency again and had a UTI or earlier this year so wishes to retest her urine.  Lastly she would like to have a refill of Ambien although the original 60 tablets I gave her she has not completed.  She uses it infrequently.  She denies any side effects or habit-forming nature.  She sometimes uses melatonin instead.  She also would like to get a flu shot.    Review of Systems   Constitutional: Negative for activity change, appetite change, chills, fever and unexpected weight change.   HENT: Positive for rhinorrhea. Negative for hearing loss, nosebleeds, sinus pain, sore throat and trouble swallowing.    Eyes: Negative for discharge and visual disturbance.   Respiratory: Negative for cough, chest tightness, shortness of breath and wheezing.    Cardiovascular: Negative for chest pain, palpitations and leg swelling.   Gastrointestinal: Positive for constipation. Negative for abdominal pain, blood in stool, diarrhea and vomiting.   Endocrine: Positive for polyuria. Negative for polydipsia.   Genitourinary: Negative for difficulty urinating, dysuria, hematuria and menstrual problem.   Musculoskeletal: Positive for arthralgias. Negative for joint swelling, neck pain and neck stiffness.   Skin: Negative for pallor and rash.   Neurological: Positive for headaches. Negative for weakness.   Psychiatric/Behavioral: Negative for confusion, dysphoric mood and suicidal ideas. The patient is not nervous/anxious.        Objective:      Physical Exam   Constitutional: She is oriented to person, place, and time. She appears well-developed and well-nourished. No distress.    Tall thin female, NAD   HENT:   Head: Normocephalic and atraumatic.   Right Ear: External ear normal.   Left Ear: External ear normal.   Mouth/Throat: Oropharynx is clear and moist. No oropharyngeal exudate.   Eyes: Pupils are equal, round, and reactive to light. Conjunctivae are normal. No scleral icterus.   Neck: Normal range of motion. Neck supple. No thyromegaly present.   Cardiovascular: Normal rate and regular rhythm.   No murmur (I did not hear a mitral murmur or click this morning) heard.  Pulmonary/Chest: Effort normal and breath sounds normal. She has no wheezes.   Abdominal: Soft. Bowel sounds are normal. She exhibits no distension. There is no tenderness.   Musculoskeletal: She exhibits no tenderness.   Lymphadenopathy:     She has no cervical adenopathy.   Neurological: She is alert and oriented to person, place, and time.   Skin: No rash noted.   Psychiatric: She has a normal mood and affect.   Vitals reviewed.      Assessment:       1. Insomnia, unspecified type    2. Routine physical examination    3. Frequency of micturition    4. Constipation, unspecified constipation type    5. Chronic constipation    6. Malignant melanoma, unspecified site    7. Renal stones    8. Hyperparathyroidism    9. Senile osteoporosis    10. Exposure to hepatitis        Plan:       Martha LARA was seen today for insomnia.    Diagnoses and all orders for this visit:    Insomnia, unspecified type  -     CBC auto differential; Future  -     Comprehensive metabolic panel; Future  -     zolpidem (AMBIEN) 5 MG Tab; Take 1 tablet (5 mg total) by mouth nightly as needed.    Routine physical examination  -     CBC auto differential; Future  -     Comprehensive metabolic panel; Future    Frequency of micturition  -     Urine culture; Future  -     Urinalysis; Future  -     CBC auto differential; Future  -     Comprehensive metabolic panel; Future    Constipation, unspecified constipation type  -     CBC auto differential; Future  -      Comprehensive metabolic panel; Future    Chronic constipation  -     CBC auto differential; Future  -     Comprehensive metabolic panel; Future    Malignant melanoma, unspecified site  -     CBC auto differential; Future  -     Comprehensive metabolic panel; Future    Renal stones  -     CBC auto differential; Future  -     Comprehensive metabolic panel; Future    Hyperparathyroidism    Senile osteoporosis    Exposure to hepatitis  Comments:  Mom had unknown type that lead to cirrhosis.   Orders:  -     Hepatitis B surface antibody; Future          I did encourage her follow-up with her dermatologist with history of melanoma.

## 2019-10-27 ENCOUNTER — TELEPHONE (OUTPATIENT)
Dept: INTERNAL MEDICINE | Facility: CLINIC | Age: 64
End: 2019-10-27

## 2020-01-21 LAB — HM MAMMOGRAM: NEGATIVE

## 2020-02-11 ENCOUNTER — PATIENT OUTREACH (OUTPATIENT)
Dept: ADMINISTRATIVE | Facility: OTHER | Age: 65
End: 2020-02-11

## 2020-02-12 ENCOUNTER — OFFICE VISIT (OUTPATIENT)
Dept: ENDOCRINOLOGY | Facility: CLINIC | Age: 65
End: 2020-02-12
Payer: MEDICARE

## 2020-02-12 VITALS
SYSTOLIC BLOOD PRESSURE: 116 MMHG | WEIGHT: 140.75 LBS | BODY MASS INDEX: 19.06 KG/M2 | DIASTOLIC BLOOD PRESSURE: 80 MMHG | HEART RATE: 72 BPM | HEIGHT: 72 IN

## 2020-02-12 DIAGNOSIS — R82.994 HYPERCALCIURIA: ICD-10-CM

## 2020-02-12 DIAGNOSIS — E21.3 HYPERPARATHYROIDISM: ICD-10-CM

## 2020-02-12 DIAGNOSIS — M81.0 OSTEOPOROSIS WITHOUT CURRENT PATHOLOGICAL FRACTURE, UNSPECIFIED OSTEOPOROSIS TYPE: ICD-10-CM

## 2020-02-12 PROCEDURE — 99213 OFFICE O/P EST LOW 20 MIN: CPT | Mod: S$PBB,,, | Performed by: INTERNAL MEDICINE

## 2020-02-12 PROCEDURE — 99213 OFFICE O/P EST LOW 20 MIN: CPT | Mod: PBBFAC | Performed by: INTERNAL MEDICINE

## 2020-02-12 PROCEDURE — 99999 PR PBB SHADOW E&M-EST. PATIENT-LVL III: CPT | Mod: PBBFAC,,, | Performed by: INTERNAL MEDICINE

## 2020-02-12 PROCEDURE — 99999 PR PBB SHADOW E&M-EST. PATIENT-LVL III: ICD-10-PCS | Mod: PBBFAC,,, | Performed by: INTERNAL MEDICINE

## 2020-02-12 PROCEDURE — 99213 PR OFFICE/OUTPT VISIT, EST, LEVL III, 20-29 MIN: ICD-10-PCS | Mod: S$PBB,,, | Performed by: INTERNAL MEDICINE
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
## 2020-02-12 NOTE — ASSESSMENT & PLAN NOTE
Risk factors:  Family history,  race, postmenopausal status  Vitamin D has improved and PTH levels have normalized.   Denies falls or fractures.     Does not want to try any medication at this time.

## 2020-03-30 ENCOUNTER — PATIENT MESSAGE (OUTPATIENT)
Dept: ENDOCRINOLOGY | Facility: CLINIC | Age: 65
End: 2020-03-30

## 2020-04-03 ENCOUNTER — NURSE TRIAGE (OUTPATIENT)
Dept: ADMINISTRATIVE | Facility: CLINIC | Age: 65
End: 2020-04-03

## 2020-04-03 ENCOUNTER — TELEPHONE (OUTPATIENT)
Dept: INTERNAL MEDICINE | Facility: CLINIC | Age: 65
End: 2020-04-03

## 2020-04-03 ENCOUNTER — PATIENT MESSAGE (OUTPATIENT)
Dept: INTERNAL MEDICINE | Facility: CLINIC | Age: 65
End: 2020-04-03

## 2020-04-03 DIAGNOSIS — K92.1 BLACK STOOL: Primary | ICD-10-CM

## 2020-04-03 PROBLEM — D64.9 SYMPTOMATIC ANEMIA: Status: ACTIVE | Noted: 2020-04-03

## 2020-04-03 NOTE — TELEPHONE ENCOUNTER
Watch for reply from pt or call her. See my portal response. She may need urgent care visit for possible black stool secondary to GI issue.

## 2020-04-03 NOTE — TELEPHONE ENCOUNTER
Spoke to pt about results which show a significant drop in her blood count. With her complaints of low BP, dizziness, elevated HR and black stool this is concerning for GI bleed. I advised patient to seek care in ED for further evaluation/treatment. Pt agreed and voiced understanding of plan.       Component      Latest Ref Rng & Units 4/3/2020 10/23/2019   WBC      3.90 - 12.70 K/uL 5.66 3.69 (L)   RBC      4.00 - 5.40 M/uL 2.37 (L) 4.34   Hemoglobin      12.0 - 16.0 g/dL 7.0 (L) 12.5   Hematocrit      37.0 - 48.5 % 21.6 (L) 37.3         Mariola Choi PA-C

## 2020-04-04 PROBLEM — K92.1 MELENA: Status: RESOLVED | Noted: 2020-04-04 | Resolved: 2020-04-04

## 2020-04-04 PROBLEM — K92.1 MELENA: Status: ACTIVE | Noted: 2020-04-04

## 2020-04-04 PROBLEM — D64.9 SYMPTOMATIC ANEMIA: Status: RESOLVED | Noted: 2020-04-03 | Resolved: 2020-04-04

## 2020-04-04 NOTE — TELEPHONE ENCOUNTER
Reason for Disposition   Patient already left for the hospital/clinic.    Additional Information   Negative: Caller is angry or rude (e.g., hangs up, verbally abusive, yelling)   Negative: Caller hangs up   Negative: Caller has already spoken with the PCP and has no further questions.   Negative: Caller has already spoken with another triager and has no further questions.   Negative: Caller has already spoken with another triager or PCP AND has further questions AND triager able to answer questions.   Negative: Pager number given.  Answering service notified.   Negative: Cell phone out of range.  Phone number verified.   Negative: Second attempt to contact family AND no contact made.  Phone number verified.   Negative: Wrong number reached.  Phone number verified.   Negative: Message left with person in household.   Negative: Message left on unidentified voice mail.  Phone number verified.   Negative: Message left on identified voice mail   Negative: No answer.  First attempt to contact caller.  Follow-up call scheduled within 15 minutes.   Negative: Busy signal.  First attempt to contact caller.  Follow-up call scheduled within 15 minutes.    Protocols used: NO CONTACT OR DUPLICATE CONTACT CALL-A-

## 2020-04-06 ENCOUNTER — TELEPHONE (OUTPATIENT)
Dept: INTERNAL MEDICINE | Facility: CLINIC | Age: 65
End: 2020-04-06

## 2020-04-06 ENCOUNTER — PATIENT MESSAGE (OUTPATIENT)
Dept: INTERNAL MEDICINE | Facility: CLINIC | Age: 65
End: 2020-04-06

## 2020-04-06 ENCOUNTER — TELEPHONE (OUTPATIENT)
Dept: SURGERY | Facility: CLINIC | Age: 65
End: 2020-04-06

## 2020-04-06 ENCOUNTER — TELEPHONE (OUTPATIENT)
Dept: GASTROENTEROLOGY | Facility: CLINIC | Age: 65
End: 2020-04-06

## 2020-04-06 NOTE — TELEPHONE ENCOUNTER
Patient called bienvenido ask my assistance with getting an appointment with GI for recent ED admit-GI bleed,transfusion. Message to Sylvia Mariano to assist with timely appointment.

## 2020-04-06 NOTE — TELEPHONE ENCOUNTER
Spoke with patient.  She was seen in [place].  She was given 2 units of blood.  Is still having black, tarry stools.  Her BP at home is 100/65.  Sent a message to Dr. Amato.  ----- Message from Teofilo Mariano RN sent at 4/6/2020  3:53 PM CDT -----  Contact: pt [phone]  Can you assist with getting this lady? Determine if she is GI bleed and if urgent or emergent.   ----- Message -----  From: Lauren Quevedo RN  Sent: 4/6/2020   3:10 PM CDT  To: JASMINE Elder-can you please help with getting this patient seen quickly. Recent ED visit , symptomatic GI bleed, transfused. She will need an upper and lower.    ----- Message -----  From: Lucita Bravo  Sent: 4/6/2020   2:40 PM CDT  To: Keshawn Chisholm Staff    Pt is to schedule hospital f/u and she's not clear on if she needs gi or colon

## 2020-04-07 ENCOUNTER — OFFICE VISIT (OUTPATIENT)
Dept: GASTROENTEROLOGY | Facility: CLINIC | Age: 65
End: 2020-04-07
Payer: MEDICARE

## 2020-04-07 ENCOUNTER — TELEPHONE (OUTPATIENT)
Dept: GASTROENTEROLOGY | Facility: CLINIC | Age: 65
End: 2020-04-07

## 2020-04-07 ENCOUNTER — TELEPHONE (OUTPATIENT)
Dept: ENDOSCOPY | Facility: HOSPITAL | Age: 65
End: 2020-04-07

## 2020-04-07 DIAGNOSIS — K29.71 GASTROINTESTINAL HEMORRHAGE ASSOCIATED WITH GASTRITIS, UNSPECIFIED GASTRITIS TYPE: Primary | ICD-10-CM

## 2020-04-07 DIAGNOSIS — K92.2 ACUTE GI BLEEDING: Primary | ICD-10-CM

## 2020-04-07 DIAGNOSIS — K92.2 GASTROINTESTINAL HEMORRHAGE, UNSPECIFIED GASTROINTESTINAL HEMORRHAGE TYPE: Primary | ICD-10-CM

## 2020-04-07 DIAGNOSIS — K92.2 CHRONIC UPPER GI BLEEDING: Primary | ICD-10-CM

## 2020-04-07 PROCEDURE — 99999 PR PBB SHADOW E&M-EST. PATIENT-LVL I: ICD-10-PCS | Mod: PBBFAC,,, | Performed by: INTERNAL MEDICINE

## 2020-04-07 PROCEDURE — 99999 PR PBB SHADOW E&M-EST. PATIENT-LVL I: CPT | Mod: PBBFAC,,, | Performed by: INTERNAL MEDICINE

## 2020-04-07 PROCEDURE — 99211 OFF/OP EST MAY X REQ PHY/QHP: CPT | Mod: PBBFAC | Performed by: INTERNAL MEDICINE

## 2020-04-07 PROCEDURE — 99442 PR PHYSICIAN TELEPHONE EVALUATION 11-20 MIN: CPT | Mod: 95,S$PBB,, | Performed by: INTERNAL MEDICINE

## 2020-04-07 PROCEDURE — 99442 PR PHYSICIAN TELEPHONE EVALUATION 11-20 MIN: ICD-10-PCS | Mod: 95,S$PBB,, | Performed by: INTERNAL MEDICINE

## 2020-04-07 NOTE — TELEPHONE ENCOUNTER
Patient scheduled for an EGD on 4/8/20 at 11:20 am with Dr Roth.  Labs-Hct (and type & screen requested by Lizzy LOPEZ in lab) will be drawn prior to procedure.

## 2020-04-07 NOTE — PROGRESS NOTES
This was a phone visit.    Martha Mcfarlane is a 64-year-old lady who came to the emergency room at [facility] on April 3rd and was discharged on April 4th.  She came in with a complaint of weakness and melena.  Her baseline hemoglobin is around 12.  Her low hemoglobin on admission there was 7.  After 2 units of RBCs her discharge hemoglobin was 8.3.  At the time of the discharge the physician said that her melena had resolved.  They advised her to stop taking aspirin and start taking Protonix.  On admission there she was initially tachycardic and was also hypotensive it was stated that she had 3 days of melena before coming in.    That has now been 3 days ago.  She says she has never really stopped having melena.  Her stool continues to be black every day.  She does feel better overall.  She denies any abdominal pain or indigestion.  She says her appetite is is good but she has lost 10 lb for unknown reasons.  Right now her heart rate is in the 67-75.  Her blood pressure is closer to baseline.  She denies any history of ulcers.  She denies any significant NSAID intake.  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    Assessment  1.  Upper GI bleeding.  This has now been going on for possibly 8 days.  I am very concerned that her discharge hemoglobin was 8.3 which does not give her much of a margin to lose blood.  I recommended that we try to arrange an urgent outpatient EGD.  I will check a stat H&H before the procedure.  Of course if she feels lightheaded or weak she should probably come back to the emergency room.  If the EGD is negative then will have to consider other test which may or may not include colonoscopy and/or video capsule.  And likewise if she continues bleeding she may not be able do this is now patient may need to come back in.    I have sent a message to Dr. Roth who is on-call and to our scheduling nurse to try to expedite this.    Total time is 18 min.

## 2020-04-07 NOTE — TELEPHONE ENCOUNTER
----- Message from Larry Amato MD sent at 4/7/2020 11:41 AM CDT -----  Contact: pt [phone]  Scope tomorrow    Jonathan,  Pt at home, having melena. Just in [facility]- given two units rbcs. Dc hgb 8.3  I will order stat hgb before and set up scope  ----- Message -----  From: Teofilo Mariano RN  Sent: 4/7/2020  10:48 AM CDT  To: Larry Amato MD, Marisa Mendoza RN    Yes schedule as regular visit and put telephone call in notes. We will have to check her in at time of appointment.    Dr Amato did you receive the email from Dr Evangelista and Byron with the codes used to bill for telephone visits?       ----- Message -----  From: Marisa Mendoza RN  Sent: 4/7/2020   9:59 AM CDT  To: Teofilo Mariano RN    Since she is a new patient and doesn't have a smart phone, would I just schedule this as a regular visit and put in the notes that this is a telephone call?  Can you please answer his other question about charging? Thanks  ----- Message -----  From: Larry Amato MD  Sent: 4/7/2020   9:48 AM CDT  To: Marisa Mendoza RN    I would be happy to call  Is there a way to put that in for a visit like telemed- so that I can put a note and charge in?  It looks like from the emails today that can be done somehow  ----- Message -----  From: Marisa Mendoza RN  Sent: 4/6/2020   4:04 PM CDT  To: Larry Amato MD    I spoke to this patient today.  She was seen in the [facility] and given 2 units of blood.  Is still having black, arry stools.  Her BP at home is 100/65.  She doesn't have a smart phone so cannot do a virtual visit.  Can you do a phone call with her to discuss the next steps?  ThanksMarisa  ----- Message -----  From: Teofilo Mariano RN  Sent: 4/6/2020   3:53 PM CDT  To: Marisa Mendoza RN    Can you assist with getting this lady? Determine if she is GI bleed and if urgent or emergent.   ----- Message -----  From: Lauren Quevedo RN  Sent: 4/6/2020   3:10 PM CDT  To: JASMINE Elder-can  you please help with getting this patient seen quickly. Recent ED visit , symptomatic GI bleed, transfused. She will need an upper and lower.    ----- Message -----  From: Lucita Bravo  Sent: 4/6/2020   2:40 PM CDT  To: Keshawn Chisholm Staff    Pt is to schedule hospital f/u and she's not clear on if she needs gi or colon

## 2020-04-07 NOTE — LETTER
April 7, 2020      Nas Mcneill MD  [address]  [facility] [address]           [facility]  [address]  [facility] [address]  Phone: [phone]  Fax: [phone]          Patient: Martha Mcfarlane   MR Number: [id number]   YOB: 1955   Date of Visit: 4/7/2020       Dear Dr. Nas Mcneill:    Thank you for referring Martha Mcfarlane to me for evaluation. Attached you will find relevant portions of my assessment and plan of care.    If you have questions, please do not hesitate to call me. I look forward to following Martha Mcfarlane along with you.    Sincerely,    Larry Amato MD    Enclosure  CC:  No Recipients    If you would like to receive this communication electronically, please contact [email or web address] or [phone] to request more information on [employer] Link access.    For providers and/or their staff who would like to refer a patient to [facility], please contact us through our one-stop-shop provider referral line, [facility], at [phone].    If you feel you have received this communication in error or would no longer like to receive these types of communications, please e-mail [email or web address]

## 2020-04-08 ENCOUNTER — HOSPITAL ENCOUNTER (OUTPATIENT)
Facility: HOSPITAL | Age: 65
Discharge: HOME | End: 2020-04-08
Attending: INTERNAL MEDICINE | Admitting: INTERNAL MEDICINE
Payer: MEDICARE

## 2020-04-08 ENCOUNTER — ANESTHESIA EVENT (OUTPATIENT)
Dept: ENDOSCOPY | Facility: HOSPITAL | Age: 65
End: 2020-04-08
Payer: MEDICARE

## 2020-04-08 ENCOUNTER — ANESTHESIA (OUTPATIENT)
Dept: ENDOSCOPY | Facility: HOSPITAL | Age: 65
End: 2020-04-08
Payer: MEDICARE

## 2020-04-08 VITALS
HEIGHT: 72 IN | WEIGHT: 139 LBS | SYSTOLIC BLOOD PRESSURE: 106 MMHG | TEMPERATURE: 98 F | BODY MASS INDEX: 18.83 KG/M2 | DIASTOLIC BLOOD PRESSURE: 55 MMHG | OXYGEN SATURATION: 100 % | HEART RATE: 61 BPM | RESPIRATION RATE: 16 BRPM

## 2020-04-08 DIAGNOSIS — K92.1 MELENA: Primary | ICD-10-CM

## 2020-04-08 PROCEDURE — D9220A PRA ANESTHESIA: ICD-10-PCS | Mod: ANES,,, | Performed by: ANESTHESIOLOGY

## 2020-04-08 PROCEDURE — D9220A PRA ANESTHESIA: Mod: CRNA,,, | Performed by: NURSE ANESTHETIST, CERTIFIED REGISTERED

## 2020-04-08 PROCEDURE — 37000009 HC ANESTHESIA EA ADD 15 MINS: Performed by: INTERNAL MEDICINE

## 2020-04-08 PROCEDURE — 43235 EGD DIAGNOSTIC BRUSH WASH: CPT | Performed by: INTERNAL MEDICINE

## 2020-04-08 PROCEDURE — 25000003 PHARM REV CODE 250: Performed by: NURSE ANESTHETIST, CERTIFIED REGISTERED

## 2020-04-08 PROCEDURE — D9220A PRA ANESTHESIA: Mod: ANES,,, | Performed by: ANESTHESIOLOGY

## 2020-04-08 PROCEDURE — D9220A PRA ANESTHESIA: ICD-10-PCS | Mod: CRNA,,, | Performed by: NURSE ANESTHETIST, CERTIFIED REGISTERED

## 2020-04-08 PROCEDURE — 25000003 PHARM REV CODE 250: Performed by: INTERNAL MEDICINE

## 2020-04-08 PROCEDURE — 63600175 PHARM REV CODE 636 W HCPCS: Performed by: NURSE ANESTHETIST, CERTIFIED REGISTERED

## 2020-04-08 PROCEDURE — 94761 N-INVAS EAR/PLS OXIMETRY MLT: CPT

## 2020-04-08 PROCEDURE — 43235 EGD DIAGNOSTIC BRUSH WASH: CPT | Mod: ,,, | Performed by: INTERNAL MEDICINE

## 2020-04-08 PROCEDURE — 37000008 HC ANESTHESIA 1ST 15 MINUTES: Performed by: INTERNAL MEDICINE

## 2020-04-08 PROCEDURE — 43235 PR EGD, FLEX, DIAGNOSTIC: ICD-10-PCS | Mod: ,,, | Performed by: INTERNAL MEDICINE

## 2020-04-08 RX ORDER — GLYCOPYRROLATE 0.2 MG/ML
INJECTION INTRAMUSCULAR; INTRAVENOUS
Status: DISCONTINUED | OUTPATIENT
Start: 2020-04-08 | End: 2020-04-08

## 2020-04-08 RX ORDER — FENTANYL CITRATE 50 UG/ML
25 INJECTION, SOLUTION INTRAMUSCULAR; INTRAVENOUS EVERY 5 MIN PRN
Status: DISCONTINUED | OUTPATIENT
Start: 2020-04-08 | End: 2020-04-08 | Stop reason: HOSPADM

## 2020-04-08 RX ORDER — ONDANSETRON 2 MG/ML
4 INJECTION INTRAMUSCULAR; INTRAVENOUS ONCE AS NEEDED
Status: DISCONTINUED | OUTPATIENT
Start: 2020-04-08 | End: 2020-04-08 | Stop reason: HOSPADM

## 2020-04-08 RX ORDER — FENTANYL CITRATE 50 UG/ML
INJECTION, SOLUTION INTRAMUSCULAR; INTRAVENOUS
Status: DISCONTINUED | OUTPATIENT
Start: 2020-04-08 | End: 2020-04-08

## 2020-04-08 RX ORDER — LIDOCAINE HCL/PF 100 MG/5ML
SYRINGE (ML) INTRAVENOUS
Status: DISCONTINUED | OUTPATIENT
Start: 2020-04-08 | End: 2020-04-08

## 2020-04-08 RX ORDER — SODIUM CHLORIDE 9 MG/ML
INJECTION, SOLUTION INTRAVENOUS CONTINUOUS
Status: DISCONTINUED | OUTPATIENT
Start: 2020-04-08 | End: 2020-04-08 | Stop reason: HOSPADM

## 2020-04-08 RX ORDER — POLYETHYLENE GLYCOL 3350 17 G/17G
POWDER, FOR SOLUTION ORAL
COMMUNITY
End: 2021-10-27

## 2020-04-08 RX ORDER — PROPOFOL 10 MG/ML
VIAL (ML) INTRAVENOUS
Status: DISCONTINUED | OUTPATIENT
Start: 2020-04-08 | End: 2020-04-08

## 2020-04-08 RX ORDER — PROPOFOL 10 MG/ML
VIAL (ML) INTRAVENOUS CONTINUOUS PRN
Status: DISCONTINUED | OUTPATIENT
Start: 2020-04-08 | End: 2020-04-08

## 2020-04-08 RX ORDER — HYDROMORPHONE HYDROCHLORIDE 1 MG/ML
0.2 INJECTION, SOLUTION INTRAMUSCULAR; INTRAVENOUS; SUBCUTANEOUS EVERY 5 MIN PRN
Status: DISCONTINUED | OUTPATIENT
Start: 2020-04-08 | End: 2020-04-08 | Stop reason: HOSPADM

## 2020-04-08 RX ADMIN — GLYCOPYRROLATE 0.2 MG: 0.2 INJECTION, SOLUTION INTRAMUSCULAR; INTRAVENOUS at 10:04

## 2020-04-08 RX ADMIN — SODIUM CHLORIDE: 0.9 INJECTION, SOLUTION INTRAVENOUS at 10:04

## 2020-04-08 RX ADMIN — Medication 100 MG: at 10:04

## 2020-04-08 RX ADMIN — FENTANYL CITRATE 25 MCG: 50 INJECTION, SOLUTION INTRAMUSCULAR; INTRAVENOUS at 10:04

## 2020-04-08 RX ADMIN — PROPOFOL 70 MG: 10 INJECTION, EMULSION INTRAVENOUS at 10:04

## 2020-04-08 RX ADMIN — PROPOFOL 100 MCG/KG/MIN: 10 INJECTION, EMULSION INTRAVENOUS at 10:04

## 2020-04-08 NOTE — ANESTHESIA POSTPROCEDURE EVALUATION
Anesthesia Post Evaluation    Patient: Martha Mcfarlane    Procedure(s) Performed: Procedure(s) (LRB):  EGD (ESOPHAGOGASTRODUODENOSCOPY) (N/A)    Final Anesthesia Type: general    Patient location during evaluation: PACU  Patient participation: Yes- Able to Participate  Level of consciousness: awake  Post-procedure vital signs: reviewed and stable  Pain management: adequate  Airway patency: patent    PONV status at discharge: No PONV  Anesthetic complications: no      Cardiovascular status: blood pressure returned to baseline  Respiratory status: unassisted  Hydration status: euvolemic  Follow-up not needed.          Vitals Value Taken Time   /55 4/8/2020 11:31 AM   Temp 36.7 °C (98 °F) 4/8/2020 11:45 AM   Pulse 61 4/8/2020 11:51 AM   Resp 29 4/8/2020 11:51 AM   SpO2 99 % 4/8/2020 11:51 AM   Vitals shown include unvalidated device data.      Event Time     Out of Recovery 11:59:00          Pain/Nancy Score: Nancy Score: 10 (4/8/2020 11:45 AM)

## 2020-04-08 NOTE — DISCHARGE INSTRUCTIONS

## 2020-04-08 NOTE — ANESTHESIA PREPROCEDURE EVALUATION
04/08/2020  Martha Mcfarlane is a 64 y.o., female.    Anesthesia Evaluation         Review of Systems  Anesthesia Hx:  No problems with previous Anesthesia   Social:  Non-Smoker    Cardiovascular:   Exercise tolerance: good Denies Hypertension.  Denies CAD.     Denies Angina.  Functional Capacity Can you climb two flights of stairs? ==> Yes    Pulmonary:   Denies Asthma.  Denies Recent URI.  Denies Sleep Apnea.    Renal/:  Renal/ Normal     Hepatic/GI:   Denies PUD. Denies Hiatal Hernia.  Denies GERD. Denies Liver Disease.  Denies Hepatitis.    Neurological:   Denies CVA. Denies Seizures.    Endocrine:   Denies Diabetes. Denies Hypothyroidism.        Physical Exam  General:  Well nourished    Airway/Jaw/Neck:  Airway Findings: Mouth Opening: Normal Tongue: Normal  General Airway Assessment: Adult  Mallampati: I  TM Distance: Normal, at least 6 cm  Jaw/Neck Findings:  Neck ROM: Normal ROM  Neck Findings:     Eyes/Ears/Nose:  EYES/EARS/NOSE FINDINGS: Normal   Dental:  Dental Findings: In tact        Mental Status:  Mental Status Findings:  Alert and Oriented         Anesthesia Plan  Type of Anesthesia, risks & benefits discussed:  Anesthesia Type:  general  Patient's Preference: Proceed with anesthesia understanding that the risks are very small but could be serious or life threatening.  Intra-op Monitoring Plan: standard ASA monitors  Intra-op Monitoring Plan Comments:   Post Op Pain Control Plan:   Post Op Pain Control Plan Comments:   Induction:   IV  Beta Blocker:  Patient is not currently on a Beta-Blocker (No further documentation required).       Informed Consent: Patient understands risks and agrees with Anesthesia plan.  Questions answered. Anesthesia consent signed with patient.  ASA Score: 1     Day of Surgery Review of History & Physical: I have interviewed and examined the patient. I have  reviewed the patient's H&P dated:            Ready For Surgery From Anesthesia Perspective.

## 2020-04-08 NOTE — H&P
Short Stay Endoscopy History and Physical    PCP - Nas Mcneill MD    Procedure - EGD  ASA - per anesthesia  Mallampati - per anesthesia  History of Anesthesia problems - no  Family history Anesthesia problems -  no   Plan of anesthesia - MAC    HPI:  This is a 64 y.o. female here for evaluation of melena.        ROS:  Constitutional: No fevers, chills, No weight loss  CV: No chest pain  Pulm: No cough, No shortness of breath  Ophtho: No vision changes  GI: see HPI    Medical History:  has a past medical history of Anemia, Constipation - functional, Encounter for blood transfusion, Kidney calculi, Melanoma, Mitral valve prolapse, Osteoporosis, unspecified, and Stroke.    Surgical History:  has a past surgical history that includes Intussusception repair; Appendectomy; Partial hysterectomy; Left colectomy; LASIK; melanoma on face removal; Colon surgery; Colonoscopy; Colonoscopy (N/A, 12/15/2015); and Hysterectomy.    Family History: family history includes Diabetes in her brother and father; Hepatitis in her mother.. Otherwise no colon cancer, inflammatory bowel disease, or GI malignancies.    Social History:  reports that she has never smoked. She has never used smokeless tobacco. She reports that she does not drink alcohol or use drugs.    Review of patient's allergies indicates:   Allergen Reactions    Orange flavor      Severe headache, nausea    Iodine and iodide containing products     Shellfish containing products     Benadryl [diphenhydramine hcl] Swelling       Medications:   Medications Prior to Admission   Medication Sig Dispense Refill Last Dose    ascorbic acid, vitamin C, (VITAMIN C) 500 MG tablet Take 500 mg by mouth once daily.   Past Week at Unknown time    CALCIUM CARBONATE/VITAMIN D3 (VITAMIN D-3 ORAL) Take 1 tablet by mouth 2 (two) times daily.    Past Week at Unknown time    fish oil-omega-3 fatty acids 300-1,000 mg capsule Take 2 g by mouth once daily.   Past Week at Unknown time     magnesium 30 mg Tab Take 1 tablet by mouth once daily.    Past Week at Unknown time    multivitamin (ONE DAILY MULTIVITAMIN) per tablet Take 1 tablet by mouth once daily.   Past Week at Unknown time    pantoprazole (PROTONIX) 40 MG tablet Take 1 tablet (40 mg total) by mouth once daily. 60 tablet 0 4/7/2020 at Unknown time    polyethylene glycol (GLYCOLAX) 17 gram PwPk Take by mouth.   4/7/2020 at Unknown time    zolpidem (AMBIEN) 5 MG Tab Take 1 tablet (5 mg total) by mouth nightly as needed. 30 tablet 1 Past Week at Unknown time       Physical Exam:    Vital Signs:   Vitals:    04/08/20 1006   BP: (!) 140/76   Pulse:    Resp:    Temp:        General Appearance: Well appearing in no acute distress  Eyes:    No scleral icterus  ENT: Neck supple, Lips, mucosa, and tongue normal; teeth and gums normal  Lungs: CTA anteriorly  Heart:  Regular rate, S1, S2 normal, no murmurs heard.  Abdomen: Soft, non tender, non distended with normal bowel sounds. No hepatosplenomegaly, ascites, or mass.      Labs:  Lab Results   Component Value Date    WBC 3.56 (L) 04/04/2020    HGB 8.3 (L) 04/04/2020    HCT 30.0 (L) 04/08/2020     (L) 04/04/2020    CHOL 149 01/24/2018    TRIG 46 01/24/2018    HDL 55 01/24/2018    ALT 11 04/04/2020    AST 17 04/04/2020     04/04/2020    K 3.9 04/04/2020     (H) 04/04/2020    CREATININE 0.46 (L) 04/04/2020    BUN 22 (H) 04/04/2020    CO2 25 04/04/2020    TSH 0.847 01/30/2017    INR 1.0 11/13/2005         Assessment:  64 y.o. female with melena.    Plan:  Proceed with EGD today.  I have explained the risks and benefits of endoscopy procedures to the patient including but not limited to bleeding, perforation, infection, and death.  All questions answered.      Jonathan Roth MD

## 2020-04-08 NOTE — TRANSFER OF CARE
"Anesthesia Transfer of Care Note    Patient: Martha Mcfarlane    Procedure(s) Performed: Procedure(s) (LRB):  EGD (ESOPHAGOGASTRODUODENOSCOPY) (N/A)    Patient location: PACU    Anesthesia Type: general    Transport from OR: Transported from OR on 2-3 L/min O2 by NC with adequate spontaneous ventilation    Post pain: adequate analgesia    Post assessment: no apparent anesthetic complications and tolerated procedure well    Post vital signs: stable    Level of consciousness: awake, alert and oriented    Nausea/Vomiting: no nausea/vomiting    Complications: none    Transfer of care protocol was followed      Last vitals:   Visit Vitals  BP (!) 102/53   Pulse 77   Temp 36.7 °C (98.1 °F) (Temporal)   Resp 16   Ht 6' 1" (1.854 m)   Wt 63 kg (139 lb)   SpO2 99%   Breastfeeding? No   BMI 18.34 kg/m²     "

## 2020-04-08 NOTE — [employer] PATIENT INSTRUCTIONS
Discharge Summary/Instructions after an Endoscopic Procedure  Patient Name: Martha Mcfarlane  Patient MRN: [id number]  Patient YOB: 1955 Wednesday, April 8, 2020  Jonathan Roth MD  RESTRICTIONS:  During your procedure today, you received medications for sedation.  These   medications may affect your judgment, balance and coordination.  Therefore,   for 24 hours, you have the following restrictions:   - DO NOT drive a car, operate machinery, make legal/financial decisions,   sign important papers or drink alcohol.    ACTIVITY:  Today: no heavy lifting, straining or running due to procedural   sedation/anesthesia.  The following day: return to full activity including work.  DIET:  Eat and drink normally unless instructed otherwise.     TREATMENT FOR COMMON SIDE EFFECTS:  - Mild abdominal pain, nausea, belching, bloating or excessive gas:  rest,   eat lightly and use a heating pad.  - Sore Throat: treat with throat lozenges and/or gargle with warm salt   water.  - Because air was used during the procedure, expelling large amounts of air   from your rectum or belching is normal.  - If a bowel prep was taken, you may not have a bowel movement for 1-3 days.    This is normal.  SYMPTOMS TO WATCH FOR AND REPORT TO YOUR PHYSICIAN:  1. Abdominal pain or bloating, other than gas cramps.  2. Chest pain.  3. Back pain.  4. Signs of infection such as: chills or fever occurring within 24 hours   after the procedure.  5. Rectal bleeding, which would show as bright red, maroon, or black stools.   (A tablespoon of blood from the rectum is not serious, especially if   hemorrhoids are present.)  6. Vomiting.  7. Weakness or dizziness.  GO DIRECTLY TO THE NEAREST EMERGENCY ROOM IF YOU HAVE ANY OF THE FOLLOWING:      Difficulty breathing              Chills and/or fever over 101 F   Persistent vomiting and/or vomiting blood   Severe abdominal pain   Severe chest pain   Black, tarry stools   Bleeding- more than one  tablespoon   Any other symptom or condition that you feel may need urgent attention  Your doctor recommends these additional instructions:  If any biopsies were taken, your doctors clinic will contact you in 1 to 2   weeks with any results.  - Discharge patient to home.   - Patient has a contact number available for emergencies.  The signs and   symptoms of potential delayed complications were discussed with the   patient.  Return to normal activities tomorrow.  Written discharge   instructions were provided to the patient.   - Resume previous diet.   - Continue present medications. Patient already prescribed and taking   Protonix.  Patient already avoiding NSAIDs and aspirin has been stopped.  - Return to referring physician at appointment to be scheduled.   For questions, problems or results please call your physician - Jonathan Roth MD at Work:  [phone].  [facility] [place], EMERGENCY ROOM PHONE NUMBER: [phone]  IF A COMPLICATION OR EMERGENCY SITUATION ARISES AND YOU ARE UNABLE TO REACH   YOUR PHYSICIAN - GO DIRECTLY TO THE EMERGENCY ROOM.  Jonathan Roth MD  4/8/2020 10:45:57 AM  This report has been verified and signed electronically.  [employer]

## 2020-04-08 NOTE — PROGRESS NOTES
Pt AAOx4. VSS. Denies pain or N/V.   Dr. Roth talked with pt post op.   Discharge instructions given to pt, verbalizes understanding. No prescriptions to give. Updated  by phone.  Peripheral IV removed, catheter tip intact.  Pt dressed and placed in wheelchair.

## 2020-04-15 ENCOUNTER — TELEPHONE (OUTPATIENT)
Dept: SURGERY | Facility: CLINIC | Age: 65
End: 2020-04-15

## 2020-04-15 ENCOUNTER — PATIENT MESSAGE (OUTPATIENT)
Dept: SURGERY | Facility: CLINIC | Age: 65
End: 2020-04-15

## 2020-04-15 DIAGNOSIS — K62.5 RECTAL BLEEDING: Primary | ICD-10-CM

## 2020-04-16 ENCOUNTER — TELEPHONE (OUTPATIENT)
Dept: ENDOSCOPY | Facility: HOSPITAL | Age: 65
End: 2020-04-16

## 2020-04-16 NOTE — TELEPHONE ENCOUNTER
----- Message from ADALBERTO Liao MD sent at 4/16/2020 12:57 PM CDT -----  Regarding: RE: arrange colonoscopy  No transfusion necessary    ----- Message -----  From: Lizzy Pearce RN  Sent: 4/16/2020  10:34 AM CDT  To: ADALBERTO Liao MD, Goran Llanes III, MD  Subject: RE: arrange colonoscopy                          Hello,    Please review most recent labs (abnormal). Let me know if she is scheduled to have a repeat/transfusion or if these results are if sufficient for the procedure.    ThanksLizzy   ----- Message -----  From: ADALBERTO Liao MD  Sent: 4/14/2020   3:46 PM CDT  To: Lizzy Pearce RN, Goran Llanes III, MD  Subject: arrange colonoscopy                              I agree.  EGD showed small non bleeding ulcer.  Will arrange for colonoscopy.    Thanks  Phan Liao  ----- Message -----  From: Goran Llanes III, MD  Sent: 4/4/2020   3:23 PM CDT  To: ADALBERTO Liao MD    Greetings   Patient was admitted to obs for symptomatic anemia 4/3-4/4     Hb down to 7 and feeling lightheaded with melena +FOBT  S/p 2 U PRBC with response and symptomatic imrpovment  Last scope looks like 2015  I know that we are postponing non-urgent egd/colon given COVID but I think given her history and PRBC requirment she would be a good candidate for oupt scope. Thanks!

## 2020-04-17 ENCOUNTER — TELEPHONE (OUTPATIENT)
Dept: SURGERY | Facility: CLINIC | Age: 65
End: 2020-04-17

## 2020-04-17 DIAGNOSIS — Z12.11 SPECIAL SCREENING FOR MALIGNANT NEOPLASMS, COLON: Primary | ICD-10-CM

## 2020-04-17 DIAGNOSIS — K62.5 RECTAL BLEEDING: Primary | ICD-10-CM

## 2020-04-17 RX ORDER — POLYETHYLENE GLYCOL 3350, SODIUM SULFATE ANHYDROUS, SODIUM BICARBONATE, SODIUM CHLORIDE, POTASSIUM CHLORIDE 236; 22.74; 6.74; 5.86; 2.97 G/4L; G/4L; G/4L; G/4L; G/4L
4 POWDER, FOR SOLUTION ORAL ONCE
Qty: 4000 ML | Refills: 0 | Status: SHIPPED | OUTPATIENT
Start: 2020-04-17 | End: 2020-04-17

## 2020-04-17 RX ORDER — FERROUS SULFATE 325(65) MG
325 TABLET ORAL 2 TIMES DAILY
Qty: 60 TABLET | Refills: 0 | Status: SHIPPED | OUTPATIENT
Start: 2020-04-17 | End: 2020-05-21 | Stop reason: SDUPTHER

## 2020-04-17 NOTE — TELEPHONE ENCOUNTER
Discussed with pt.  She describes melena as opposed to bright red blood.  She no longer is seeing black stools since EGD and starting Protonix.      We will cancel colonoscopy.    Start iron.  Recheck H/h in 4 weeks

## 2020-05-21 RX ORDER — FERROUS SULFATE 325(65) MG
325 TABLET ORAL 2 TIMES DAILY
Qty: 60 TABLET | Refills: 2 | Status: SHIPPED | OUTPATIENT
Start: 2020-05-21 | End: 2021-10-27

## 2020-05-25 ENCOUNTER — LAB VISIT (OUTPATIENT)
Dept: LAB | Facility: HOSPITAL | Age: 65
End: 2020-05-25
Attending: INTERNAL MEDICINE
Payer: MEDICARE

## 2020-05-25 ENCOUNTER — OFFICE VISIT (OUTPATIENT)
Dept: GASTROENTEROLOGY | Facility: CLINIC | Age: 65
End: 2020-05-25
Payer: MEDICARE

## 2020-05-25 VITALS
SYSTOLIC BLOOD PRESSURE: 131 MMHG | HEIGHT: 72 IN | HEART RATE: 72 BPM | WEIGHT: 146.63 LBS | DIASTOLIC BLOOD PRESSURE: 75 MMHG | BODY MASS INDEX: 19.86 KG/M2

## 2020-05-25 DIAGNOSIS — K26.4 DUODENAL ULCER WITH HEMORRHAGE: ICD-10-CM

## 2020-05-25 DIAGNOSIS — K26.4 DUODENAL ULCER WITH HEMORRHAGE: Primary | ICD-10-CM

## 2020-05-25 LAB
BASOPHILS # BLD AUTO: 0.03 K/UL (ref 0–0.2)
BASOPHILS NFR BLD: 0.7 % (ref 0–1.9)
DIFFERENTIAL METHOD: ABNORMAL
EOSINOPHIL # BLD AUTO: 0.1 K/UL (ref 0–0.5)
EOSINOPHIL NFR BLD: 1.7 % (ref 0–8)
ERYTHROCYTE [DISTWIDTH] IN BLOOD BY AUTOMATED COUNT: 13.1 % (ref 11.5–14.5)
HCT VFR BLD AUTO: 36.4 % (ref 37–48.5)
HGB BLD-MCNC: 11.5 G/DL (ref 12–16)
IMM GRANULOCYTES # BLD AUTO: 0.01 K/UL (ref 0–0.04)
IMM GRANULOCYTES NFR BLD AUTO: 0.2 % (ref 0–0.5)
LYMPHOCYTES # BLD AUTO: 1.2 K/UL (ref 1–4.8)
LYMPHOCYTES NFR BLD: 28.4 % (ref 18–48)
MCH RBC QN AUTO: 29 PG (ref 27–31)
MCHC RBC AUTO-ENTMCNC: 31.6 G/DL (ref 32–36)
MCV RBC AUTO: 92 FL (ref 82–98)
MONOCYTES # BLD AUTO: 0.3 K/UL (ref 0.3–1)
MONOCYTES NFR BLD: 6.7 % (ref 4–15)
NEUTROPHILS # BLD AUTO: 2.5 K/UL (ref 1.8–7.7)
NEUTROPHILS NFR BLD: 62.3 % (ref 38–73)
NRBC BLD-RTO: 0 /100 WBC
PLATELET # BLD AUTO: 194 K/UL (ref 150–350)
PMV BLD AUTO: 10.4 FL (ref 9.2–12.9)
RBC # BLD AUTO: 3.97 M/UL (ref 4–5.4)
WBC # BLD AUTO: 4.05 K/UL (ref 3.9–12.7)

## 2020-05-25 PROCEDURE — 99999 PR PBB SHADOW E&M-EST. PATIENT-LVL III: ICD-10-PCS | Mod: PBBFAC,,, | Performed by: INTERNAL MEDICINE

## 2020-05-25 PROCEDURE — 99999 PR PBB SHADOW E&M-EST. PATIENT-LVL III: CPT | Mod: PBBFAC,,, | Performed by: INTERNAL MEDICINE

## 2020-05-25 PROCEDURE — 86677 HELICOBACTER PYLORI ANTIBODY: CPT

## 2020-05-25 PROCEDURE — 99213 PR OFFICE/OUTPT VISIT, EST, LEVL III, 20-29 MIN: ICD-10-PCS | Mod: S$PBB,,, | Performed by: INTERNAL MEDICINE

## 2020-05-25 PROCEDURE — 99213 OFFICE O/P EST LOW 20 MIN: CPT | Mod: S$PBB,,, | Performed by: INTERNAL MEDICINE

## 2020-05-25 PROCEDURE — 99213 OFFICE O/P EST LOW 20 MIN: CPT | Mod: PBBFAC | Performed by: INTERNAL MEDICINE

## 2020-05-25 PROCEDURE — 85025 COMPLETE CBC W/AUTO DIFF WBC: CPT

## 2020-05-25 PROCEDURE — 36415 COLL VENOUS BLD VENIPUNCTURE: CPT

## 2020-05-25 RX ORDER — PANTOPRAZOLE SODIUM 40 MG/1
40 TABLET, DELAYED RELEASE ORAL DAILY
Qty: 60 TABLET | Refills: 0 | Status: SHIPPED | OUTPATIENT
Start: 2020-05-25 | End: 2020-07-26

## 2020-05-25 NOTE — PROGRESS NOTES
This is a hospital follow-up visit.  I got a call about this patient in early April.  She was admitted to her local hospital with the upper GI bleed.  This presented with painless melena.  I do not know what her lowest hemoglobin was but she received 2 units of blood and was discharged with a hemoglobin of 8.3.  Her hospital was unable to offer EGD and so I arranged for it to be done here as an outpatient the day after her discharge.  Dr. Roth did an exam and found a small duodenal ulcer.  It was a cratered ulcer but without signs of active bleeding.    Since that time she has improved.  Her energy level has improved.  She is having slightly dark stools but she is taking iron.  She was taking aspirin at the time of the incident.  She has now stopped that.  She has been on Protonix.  Her last hemoglobin on April 16th was 10.4.    Assessment  1.  Duodenal ulcer with hemorrhage.  I think the ulcer likely explains her bleeding.  I do not think any further tests need to be done unless there are signs of persistent anemia or current bleeding.  For instance she had a normal colonoscopy 4 and half years ago.  I do not think that needs to be repeated.  I did discuss potential causes of her ulcer.  It could be due to her baby aspirin.  That happens in low percentage of people.  It could also be due to H pylori.  I will check a blood test for that today and treat if positive.  I have asked her to take pantoprazole for 1 more month and then she can stop.  I advised future caution against aspirin or any NSAIDs.  As soon as a hemoglobin looks good she can stop taking the iron as well.    15 min appointment greater than half the time face-to-face counseling

## 2020-06-01 LAB — H PYLORI IGG SERPL QL IA: ABNORMAL

## 2020-07-08 DIAGNOSIS — K92.1 MELENA: Primary | ICD-10-CM

## 2020-07-09 ENCOUNTER — LAB VISIT (OUTPATIENT)
Dept: LAB | Facility: HOSPITAL | Age: 65
End: 2020-07-09
Attending: INTERNAL MEDICINE
Payer: MEDICARE

## 2020-07-09 DIAGNOSIS — K92.1 MELENA: ICD-10-CM

## 2020-07-09 PROCEDURE — 87338 HPYLORI STOOL AG IA: CPT

## 2020-07-16 LAB — H PYLORI AG STL QL IA: NOT DETECTED

## 2020-07-17 ENCOUNTER — PATIENT MESSAGE (OUTPATIENT)
Dept: GASTROENTEROLOGY | Facility: CLINIC | Age: 65
End: 2020-07-17

## 2020-08-11 DIAGNOSIS — K92.1 MELENA: Primary | ICD-10-CM

## 2020-08-11 NOTE — TELEPHONE ENCOUNTER
Spoke with patient.  She will  stool specimen container from the , 4th floor [facility], tomorrow.    Beatriz

## 2020-09-01 ENCOUNTER — LAB VISIT (OUTPATIENT)
Dept: LAB | Facility: HOSPITAL | Age: 65
End: 2020-09-01
Attending: INTERNAL MEDICINE
Payer: MEDICARE

## 2020-09-01 DIAGNOSIS — K92.1 MELENA: ICD-10-CM

## 2020-09-01 PROCEDURE — 87338 HPYLORI STOOL AG IA: CPT

## 2020-09-09 LAB — H PYLORI AG STL QL IA: NOT DETECTED

## 2020-10-26 ENCOUNTER — LAB VISIT (OUTPATIENT)
Dept: LAB | Facility: HOSPITAL | Age: 65
End: 2020-10-26
Attending: INTERNAL MEDICINE
Payer: MEDICARE

## 2020-10-26 ENCOUNTER — IMMUNIZATION (OUTPATIENT)
Dept: INTERNAL MEDICINE | Facility: CLINIC | Age: 65
End: 2020-10-26
Payer: MEDICARE

## 2020-10-26 ENCOUNTER — OFFICE VISIT (OUTPATIENT)
Dept: INTERNAL MEDICINE | Facility: CLINIC | Age: 65
End: 2020-10-26
Payer: MEDICARE

## 2020-10-26 VITALS
HEART RATE: 55 BPM | DIASTOLIC BLOOD PRESSURE: 84 MMHG | OXYGEN SATURATION: 99 % | WEIGHT: 143.06 LBS | HEIGHT: 72 IN | SYSTOLIC BLOOD PRESSURE: 134 MMHG | BODY MASS INDEX: 19.38 KG/M2

## 2020-10-26 DIAGNOSIS — E78.5 DYSLIPIDEMIA: ICD-10-CM

## 2020-10-26 DIAGNOSIS — R35.1 NOCTURIA: ICD-10-CM

## 2020-10-26 DIAGNOSIS — R73.09 ELEVATED GLUCOSE: ICD-10-CM

## 2020-10-26 DIAGNOSIS — G47.00 INSOMNIA, UNSPECIFIED TYPE: Primary | ICD-10-CM

## 2020-10-26 DIAGNOSIS — K59.00 CONSTIPATION, UNSPECIFIED CONSTIPATION TYPE: ICD-10-CM

## 2020-10-26 DIAGNOSIS — M81.0 OSTEOPOROSIS WITHOUT CURRENT PATHOLOGICAL FRACTURE, UNSPECIFIED OSTEOPOROSIS TYPE: ICD-10-CM

## 2020-10-26 DIAGNOSIS — G47.00 INSOMNIA, UNSPECIFIED TYPE: ICD-10-CM

## 2020-10-26 LAB
ANION GAP SERPL CALC-SCNC: 8 MMOL/L (ref 8–16)
BACTERIA #/AREA URNS AUTO: ABNORMAL /HPF
BILIRUB UR QL STRIP: NEGATIVE
BUN SERPL-MCNC: 14 MG/DL (ref 8–23)
CALCIUM SERPL-MCNC: 9.2 MG/DL (ref 8.7–10.5)
CHLORIDE SERPL-SCNC: 106 MMOL/L (ref 95–110)
CHOLEST SERPL-MCNC: 144 MG/DL (ref 120–199)
CHOLEST/HDLC SERPL: 2.7 {RATIO} (ref 2–5)
CLARITY UR REFRACT.AUTO: ABNORMAL
CO2 SERPL-SCNC: 28 MMOL/L (ref 23–29)
COLOR UR AUTO: YELLOW
CREAT SERPL-MCNC: 0.7 MG/DL (ref 0.5–1.4)
EST. GFR  (AFRICAN AMERICAN): >60 ML/MIN/1.73 M^2
EST. GFR  (NON AFRICAN AMERICAN): >60 ML/MIN/1.73 M^2
ESTIMATED AVG GLUCOSE: 97 MG/DL (ref 68–131)
GLUCOSE SERPL-MCNC: 84 MG/DL (ref 70–110)
GLUCOSE UR QL STRIP: NEGATIVE
HBA1C MFR BLD HPLC: 5 % (ref 4–5.6)
HDLC SERPL-MCNC: 53 MG/DL (ref 40–75)
HDLC SERPL: 36.8 % (ref 20–50)
HGB UR QL STRIP: NEGATIVE
KETONES UR QL STRIP: NEGATIVE
LDLC SERPL CALC-MCNC: 79.6 MG/DL (ref 63–159)
LEUKOCYTE ESTERASE UR QL STRIP: NEGATIVE
MICROSCOPIC COMMENT: ABNORMAL
NITRITE UR QL STRIP: POSITIVE
NONHDLC SERPL-MCNC: 91 MG/DL
PH UR STRIP: 7 [PH] (ref 5–8)
POTASSIUM SERPL-SCNC: 4 MMOL/L (ref 3.5–5.1)
PROT UR QL STRIP: NEGATIVE
RBC #/AREA URNS AUTO: 1 /HPF (ref 0–4)
SODIUM SERPL-SCNC: 142 MMOL/L (ref 136–145)
SP GR UR STRIP: 1.01 (ref 1–1.03)
SQUAMOUS #/AREA URNS AUTO: 0 /HPF
TRIGL SERPL-MCNC: 57 MG/DL (ref 30–150)
URN SPEC COLLECT METH UR: ABNORMAL
WBC #/AREA URNS AUTO: 2 /HPF (ref 0–5)

## 2020-10-26 PROCEDURE — 99214 OFFICE O/P EST MOD 30 MIN: CPT | Mod: PBBFAC,25 | Performed by: INTERNAL MEDICINE

## 2020-10-26 PROCEDURE — 99214 PR OFFICE/OUTPT VISIT, EST, LEVL IV, 30-39 MIN: ICD-10-PCS | Mod: S$PBB,,, | Performed by: INTERNAL MEDICINE

## 2020-10-26 PROCEDURE — 83036 HEMOGLOBIN GLYCOSYLATED A1C: CPT

## 2020-10-26 PROCEDURE — 90694 VACC AIIV4 NO PRSRV 0.5ML IM: CPT | Mod: PBBFAC

## 2020-10-26 PROCEDURE — G0008 ADMIN INFLUENZA VIRUS VAC: HCPCS | Mod: PBBFAC

## 2020-10-26 PROCEDURE — 99999 PR PBB SHADOW E&M-EST. PATIENT-LVL IV: CPT | Mod: PBBFAC,,, | Performed by: INTERNAL MEDICINE

## 2020-10-26 PROCEDURE — 99999 PR PBB SHADOW E&M-EST. PATIENT-LVL IV: ICD-10-PCS | Mod: PBBFAC,,, | Performed by: INTERNAL MEDICINE

## 2020-10-26 PROCEDURE — 80061 LIPID PANEL: CPT

## 2020-10-26 PROCEDURE — 81001 URINALYSIS AUTO W/SCOPE: CPT

## 2020-10-26 PROCEDURE — 99214 OFFICE O/P EST MOD 30 MIN: CPT | Mod: S$PBB,,, | Performed by: INTERNAL MEDICINE

## 2020-10-26 PROCEDURE — 36415 COLL VENOUS BLD VENIPUNCTURE: CPT

## 2020-10-26 PROCEDURE — 80048 BASIC METABOLIC PNL TOTAL CA: CPT

## 2020-10-26 RX ORDER — ZOLPIDEM TARTRATE 5 MG/1
5 TABLET ORAL NIGHTLY PRN
Qty: 30 TABLET | Refills: 1 | Status: SHIPPED | OUTPATIENT
Start: 2020-10-26 | End: 2021-04-14 | Stop reason: SDUPTHER

## 2020-10-26 NOTE — PROGRESS NOTES
Subjective:       Patient ID: Martha Mcfarlane is a 65 y.o. female.    Chief Complaint: Annual Exam    Here for annual exam.  She continues to have issues with waking several times at night to urinate and disrupting sleep.  She has seen neurology.  She is up-to-date with her gyn checkups but at her last exam did not speak with her gynecologist about this.  I wonder if maybe she is having a problem with falling bladder secondary to hysterectomy.  She will actually reach out to her gynecologist about that.  We will update blood and urine.  She would like a flu shot.  Repeat blood pressure was a little lower but I have asked her to monitor this regularly as it was a bit high this morning.  She has a machine at home.    Review of Systems   Constitutional: Negative for activity change, appetite change, chills, fever and unexpected weight change.   HENT: Positive for rhinorrhea. Negative for hearing loss, nosebleeds, sore throat and trouble swallowing.    Eyes: Positive for visual disturbance. Negative for discharge.   Respiratory: Negative for cough, chest tightness, shortness of breath and wheezing.    Cardiovascular: Negative for chest pain, palpitations and leg swelling.   Gastrointestinal: Positive for constipation. Negative for abdominal pain, blood in stool, diarrhea and vomiting.   Endocrine: Positive for polyuria. Negative for polydipsia.   Genitourinary: Negative for difficulty urinating, dysuria, hematuria and menstrual problem.   Musculoskeletal: Negative for arthralgias, joint swelling, neck pain and neck stiffness.   Integumentary:  Negative for pallor and rash.   Neurological: Positive for weakness and headaches.   Psychiatric/Behavioral: Positive for sleep disturbance. Negative for confusion, dysphoric mood and suicidal ideas. The patient is not nervous/anxious.          Objective:      Physical Exam  Constitutional:       General: She is not in acute distress.     Appearance: She is well-developed.   HENT:       Head: Normocephalic and atraumatic.      Right Ear: Tympanic membrane, ear canal and external ear normal. There is no impacted cerumen.      Left Ear: Tympanic membrane, ear canal and external ear normal. There is no impacted cerumen.      Nose: No rhinorrhea.      Mouth/Throat:      Pharynx: No oropharyngeal exudate or posterior oropharyngeal erythema.   Eyes:      General: No scleral icterus.     Conjunctiva/sclera: Conjunctivae normal.      Pupils: Pupils are equal, round, and reactive to light.   Neck:      Musculoskeletal: Normal range of motion and neck supple.      Thyroid: No thyromegaly.   Cardiovascular:      Rate and Rhythm: Normal rate and regular rhythm.      Heart sounds: No murmur.   Pulmonary:      Effort: Pulmonary effort is normal.      Breath sounds: Normal breath sounds. No wheezing.   Abdominal:      General: Bowel sounds are normal. There is no distension.      Palpations: Abdomen is soft.      Tenderness: There is no abdominal tenderness.   Musculoskeletal:         General: No tenderness.      Right lower leg: No edema.      Left lower leg: No edema.   Lymphadenopathy:      Cervical: No cervical adenopathy.   Skin:     Findings: No erythema or rash.   Neurological:      General: No focal deficit present.      Mental Status: She is alert and oriented to person, place, and time.   Psychiatric:         Mood and Affect: Mood normal.         Behavior: Behavior normal.         Assessment:       1. Insomnia, unspecified type    2. Nocturia    3. Elevated glucose    4. Osteoporosis without current pathological fracture, unspecified osteoporosis type    5. Constipation, unspecified constipation type    6. Dyslipidemia        Plan:       Martha was seen today for annual exam.    Diagnoses and all orders for this visit:    Insomnia, unspecified type  -     Urinalysis, Reflex to Urine Culture Urine, Clean Catch  -     Basic Metabolic Panel; Future  -     Hemoglobin A1C; Future  -     Lipid Panel;  Future  -     zolpidem (AMBIEN) 5 MG Tab; Take 1 tablet (5 mg total) by mouth nightly as needed.    Nocturia  -     Urinalysis, Reflex to Urine Culture Urine, Clean Catch  -     Basic Metabolic Panel; Future  -     Hemoglobin A1C; Future  -     Lipid Panel; Future    Elevated glucose  -     Urinalysis, Reflex to Urine Culture Urine, Clean Catch  -     Basic Metabolic Panel; Future  -     Hemoglobin A1C; Future  -     Lipid Panel; Future    Osteoporosis without current pathological fracture, unspecified osteoporosis type  -     Urinalysis, Reflex to Urine Culture Urine, Clean Catch  -     Basic Metabolic Panel; Future  -     Hemoglobin A1C; Future  -     Lipid Panel; Future    Constipation, unspecified constipation type  -     Urinalysis, Reflex to Urine Culture Urine, Clean Catch  -     Basic Metabolic Panel; Future  -     Hemoglobin A1C; Future  -     Lipid Panel; Future    Dyslipidemia  -     Lipid Panel; Future        call if symptoms fail to improve or worsen.  Let me know what gyn says  Flu shot

## 2020-11-02 ENCOUNTER — PATIENT MESSAGE (OUTPATIENT)
Dept: INTERNAL MEDICINE | Facility: CLINIC | Age: 65
End: 2020-11-02

## 2020-11-06 ENCOUNTER — PATIENT OUTREACH (OUTPATIENT)
Dept: ADMINISTRATIVE | Facility: HOSPITAL | Age: 65
End: 2020-11-06

## 2020-11-06 ENCOUNTER — PATIENT MESSAGE (OUTPATIENT)
Dept: ADMINISTRATIVE | Facility: HOSPITAL | Age: 65
End: 2020-11-06

## 2021-03-03 ENCOUNTER — PATIENT OUTREACH (OUTPATIENT)
Dept: ADMINISTRATIVE | Facility: HOSPITAL | Age: 66
End: 2021-03-03

## 2021-04-14 ENCOUNTER — PATIENT MESSAGE (OUTPATIENT)
Dept: INTERNAL MEDICINE | Facility: CLINIC | Age: 66
End: 2021-04-14

## 2021-04-14 DIAGNOSIS — G47.00 INSOMNIA, UNSPECIFIED TYPE: ICD-10-CM

## 2021-04-15 RX ORDER — ZOLPIDEM TARTRATE 5 MG/1
5 TABLET ORAL NIGHTLY PRN
Qty: 30 TABLET | Refills: 1 | Status: SHIPPED | OUTPATIENT
Start: 2021-04-15 | End: 2021-09-09 | Stop reason: SDUPTHER

## 2021-06-08 ENCOUNTER — PATIENT MESSAGE (OUTPATIENT)
Dept: INTERNAL MEDICINE | Facility: CLINIC | Age: 66
End: 2021-06-08

## 2021-07-02 ENCOUNTER — OFFICE VISIT (OUTPATIENT)
Dept: INTERNAL MEDICINE | Facility: CLINIC | Age: 66
End: 2021-07-02
Payer: MEDICARE

## 2021-07-02 ENCOUNTER — HOSPITAL ENCOUNTER (OUTPATIENT)
Dept: RADIOLOGY | Facility: HOSPITAL | Age: 66
Discharge: HOME | End: 2021-07-02
Attending: INTERNAL MEDICINE
Payer: MEDICARE

## 2021-07-02 VITALS
OXYGEN SATURATION: 98 % | HEART RATE: 65 BPM | WEIGHT: 138 LBS | BODY MASS INDEX: 18.69 KG/M2 | SYSTOLIC BLOOD PRESSURE: 128 MMHG | HEIGHT: 72 IN | DIASTOLIC BLOOD PRESSURE: 80 MMHG

## 2021-07-02 DIAGNOSIS — M25.531 RIGHT WRIST PAIN: ICD-10-CM

## 2021-07-02 DIAGNOSIS — M25.521 RIGHT ELBOW PAIN: Primary | ICD-10-CM

## 2021-07-02 DIAGNOSIS — M25.521 RIGHT ELBOW PAIN: ICD-10-CM

## 2021-07-02 PROCEDURE — 99214 PR OFFICE/OUTPT VISIT, EST, LEVL IV, 30-39 MIN: ICD-10-PCS | Mod: S$PBB,,, | Performed by: INTERNAL MEDICINE

## 2021-07-02 PROCEDURE — 73070 XR ELBOW 2 VIEWS RIGHT: ICD-10-PCS | Mod: 26,RT,, | Performed by: RADIOLOGY

## 2021-07-02 PROCEDURE — 73100 X-RAY EXAM OF WRIST: CPT | Mod: 26,RT,, | Performed by: RADIOLOGY

## 2021-07-02 PROCEDURE — 99214 OFFICE O/P EST MOD 30 MIN: CPT | Mod: S$PBB,,, | Performed by: INTERNAL MEDICINE

## 2021-07-02 PROCEDURE — 73070 X-RAY EXAM OF ELBOW: CPT | Mod: 26,RT,, | Performed by: RADIOLOGY

## 2021-07-02 PROCEDURE — 73100 X-RAY EXAM OF WRIST: CPT | Mod: TC,RT

## 2021-07-02 PROCEDURE — 73070 X-RAY EXAM OF ELBOW: CPT | Mod: TC,RT

## 2021-07-02 PROCEDURE — 99999 PR PBB SHADOW E&M-EST. PATIENT-LVL V: CPT | Mod: PBBFAC,,, | Performed by: INTERNAL MEDICINE

## 2021-07-02 PROCEDURE — 99215 OFFICE O/P EST HI 40 MIN: CPT | Mod: PBBFAC | Performed by: INTERNAL MEDICINE

## 2021-07-02 PROCEDURE — 73100 XR WRIST 2 VIEW RIGHT: ICD-10-PCS | Mod: 26,RT,, | Performed by: RADIOLOGY

## 2021-07-02 PROCEDURE — 99999 PR PBB SHADOW E&M-EST. PATIENT-LVL V: ICD-10-PCS | Mod: PBBFAC,,, | Performed by: INTERNAL MEDICINE

## 2021-07-06 ENCOUNTER — PATIENT MESSAGE (OUTPATIENT)
Dept: INTERNAL MEDICINE | Facility: CLINIC | Age: 66
End: 2021-07-06

## 2021-07-08 ENCOUNTER — PATIENT MESSAGE (OUTPATIENT)
Dept: INTERNAL MEDICINE | Facility: CLINIC | Age: 66
End: 2021-07-08

## 2021-08-10 ENCOUNTER — OFFICE VISIT (OUTPATIENT)
Dept: ORTHOPEDICS | Facility: CLINIC | Age: 66
End: 2021-08-10
Payer: MEDICARE

## 2021-08-10 VITALS — BODY MASS INDEX: 18.96 KG/M2 | WEIGHT: 140 LBS | HEIGHT: 72 IN

## 2021-08-10 DIAGNOSIS — M65.4 DE QUERVAIN'S TENOSYNOVITIS, RIGHT: Primary | ICD-10-CM

## 2021-08-10 DIAGNOSIS — Z01.818 PREOP TESTING: Primary | ICD-10-CM

## 2021-08-10 DIAGNOSIS — M25.531 RIGHT WRIST PAIN: ICD-10-CM

## 2021-08-10 DIAGNOSIS — M25.521 RIGHT ELBOW PAIN: ICD-10-CM

## 2021-08-10 PROCEDURE — 99999 PR PBB SHADOW E&M-EST. PATIENT-LVL IV: CPT | Mod: PBBFAC,,, | Performed by: ORTHOPAEDIC SURGERY

## 2021-08-10 PROCEDURE — 99999 PR PBB SHADOW E&M-EST. PATIENT-LVL IV: ICD-10-PCS | Mod: PBBFAC,,, | Performed by: ORTHOPAEDIC SURGERY

## 2021-08-10 PROCEDURE — 99204 PR OFFICE/OUTPT VISIT, NEW, LEVL IV, 45-59 MIN: ICD-10-PCS | Mod: S$PBB,,, | Performed by: ORTHOPAEDIC SURGERY

## 2021-08-10 PROCEDURE — 99204 OFFICE O/P NEW MOD 45 MIN: CPT | Mod: S$PBB,,, | Performed by: ORTHOPAEDIC SURGERY

## 2021-08-10 PROCEDURE — 99214 OFFICE O/P EST MOD 30 MIN: CPT | Mod: PBBFAC | Performed by: ORTHOPAEDIC SURGERY

## 2021-08-10 RX ORDER — MUPIROCIN 20 MG/G
OINTMENT TOPICAL
Status: CANCELLED | OUTPATIENT
Start: 2021-08-10

## 2021-08-16 ENCOUNTER — ANESTHESIA EVENT (OUTPATIENT)
Dept: SURGERY | Facility: HOSPITAL | Age: 66
End: 2021-08-16
Payer: MEDICARE

## 2021-08-19 ENCOUNTER — TELEPHONE (OUTPATIENT)
Dept: ORTHOPEDICS | Facility: CLINIC | Age: 66
End: 2021-08-19

## 2021-08-22 ENCOUNTER — LAB VISIT (OUTPATIENT)
Dept: SPORTS MEDICINE | Facility: CLINIC | Age: 66
End: 2021-08-22
Payer: MEDICARE

## 2021-08-22 DIAGNOSIS — Z01.818 PREOP TESTING: ICD-10-CM

## 2021-08-22 PROCEDURE — U0005 INFEC AGEN DETEC AMPLI PROBE: HCPCS | Performed by: ORTHOPAEDIC SURGERY

## 2021-08-22 PROCEDURE — U0003 INFECTIOUS AGENT DETECTION BY NUCLEIC ACID (DNA OR RNA); SEVERE ACUTE RESPIRATORY SYNDROME CORONAVIRUS 2 (SARS-COV-2) (CORONAVIRUS DISEASE [COVID-19]), AMPLIFIED PROBE TECHNIQUE, MAKING USE OF HIGH THROUGHPUT TECHNOLOGIES AS DESCRIBED BY CMS-2020-01-R: HCPCS | Performed by: ORTHOPAEDIC SURGERY

## 2021-08-23 ENCOUNTER — TELEPHONE (OUTPATIENT)
Dept: INTERNAL MEDICINE | Facility: CLINIC | Age: 66
End: 2021-08-23

## 2021-08-23 ENCOUNTER — PATIENT MESSAGE (OUTPATIENT)
Dept: SURGERY | Facility: HOSPITAL | Age: 66
End: 2021-08-23

## 2021-08-23 DIAGNOSIS — Z01.818 PREOP TESTING: Primary | ICD-10-CM

## 2021-08-23 LAB
SARS-COV-2 RNA RESP QL NAA+PROBE: DETECTED
SARS-COV-2- CYCLE NUMBER: 38

## 2021-08-25 ENCOUNTER — ANESTHESIA (OUTPATIENT)
Dept: SURGERY | Facility: HOSPITAL | Age: 66
End: 2021-08-25
Payer: MEDICARE

## 2021-09-09 DIAGNOSIS — G47.00 INSOMNIA, UNSPECIFIED TYPE: ICD-10-CM

## 2021-09-09 RX ORDER — ZOLPIDEM TARTRATE 5 MG/1
5 TABLET ORAL NIGHTLY PRN
Qty: 30 TABLET | Refills: 1 | Status: SHIPPED | OUTPATIENT
Start: 2021-09-09 | End: 2021-10-27 | Stop reason: SDUPTHER

## 2021-10-03 ENCOUNTER — LAB VISIT (OUTPATIENT)
Dept: SPORTS MEDICINE | Facility: CLINIC | Age: 66
End: 2021-10-03
Payer: MEDICARE

## 2021-10-03 DIAGNOSIS — Z01.818 PREOP TESTING: ICD-10-CM

## 2021-10-03 LAB
SARS-COV-2 RNA RESP QL NAA+PROBE: NOT DETECTED
SARS-COV-2- CYCLE NUMBER: NORMAL

## 2021-10-03 PROCEDURE — U0003 INFECTIOUS AGENT DETECTION BY NUCLEIC ACID (DNA OR RNA); SEVERE ACUTE RESPIRATORY SYNDROME CORONAVIRUS 2 (SARS-COV-2) (CORONAVIRUS DISEASE [COVID-19]), AMPLIFIED PROBE TECHNIQUE, MAKING USE OF HIGH THROUGHPUT TECHNOLOGIES AS DESCRIBED BY CMS-2020-01-R: HCPCS | Performed by: ORTHOPAEDIC SURGERY

## 2021-10-03 PROCEDURE — U0005 INFEC AGEN DETEC AMPLI PROBE: HCPCS | Performed by: ORTHOPAEDIC SURGERY

## 2021-10-05 ENCOUNTER — TELEPHONE (OUTPATIENT)
Dept: SPORTS MEDICINE | Facility: CLINIC | Age: 66
End: 2021-10-05

## 2021-10-05 RX ORDER — IBUPROFEN 600 MG/1
600 TABLET ORAL EVERY 8 HOURS PRN
Qty: 20 TABLET | Refills: 0 | Status: SHIPPED | OUTPATIENT
Start: 2021-10-05 | End: 2021-11-23

## 2021-10-05 RX ORDER — ACETAMINOPHEN 500 MG
1000 TABLET ORAL 2 TIMES DAILY PRN
Qty: 20 TABLET | Refills: 0 | Status: SHIPPED | OUTPATIENT
Start: 2021-10-05 | End: 2021-11-23

## 2021-10-05 RX ORDER — TRAMADOL HYDROCHLORIDE 50 MG/1
50 TABLET ORAL EVERY 6 HOURS PRN
Qty: 20 TABLET | Refills: 0 | Status: SHIPPED | OUTPATIENT
Start: 2021-10-05 | End: 2021-11-23

## 2021-10-06 ENCOUNTER — HOSPITAL ENCOUNTER (OUTPATIENT)
Facility: HOSPITAL | Age: 66
Discharge: HOME | End: 2021-10-06
Attending: ORTHOPAEDIC SURGERY | Admitting: ORTHOPAEDIC SURGERY
Payer: MEDICARE

## 2021-10-06 VITALS
RESPIRATION RATE: 32 BRPM | SYSTOLIC BLOOD PRESSURE: 151 MMHG | TEMPERATURE: 98 F | BODY MASS INDEX: 19.64 KG/M2 | WEIGHT: 145 LBS | OXYGEN SATURATION: 100 % | HEART RATE: 46 BPM | HEIGHT: 72 IN | DIASTOLIC BLOOD PRESSURE: 77 MMHG

## 2021-10-06 DIAGNOSIS — M25.521 RIGHT ELBOW PAIN: ICD-10-CM

## 2021-10-06 DIAGNOSIS — M65.4 DE QUERVAIN'S TENOSYNOVITIS, RIGHT: ICD-10-CM

## 2021-10-06 PROCEDURE — 71000033 HC RECOVERY, INTIAL HOUR: Performed by: ORTHOPAEDIC SURGERY

## 2021-10-06 PROCEDURE — D9220A PRA ANESTHESIA: ICD-10-PCS | Mod: ANES,,, | Performed by: ANESTHESIOLOGY

## 2021-10-06 PROCEDURE — 37000008 HC ANESTHESIA 1ST 15 MINUTES: Performed by: ORTHOPAEDIC SURGERY

## 2021-10-06 PROCEDURE — 36000706: Performed by: ORTHOPAEDIC SURGERY

## 2021-10-06 PROCEDURE — 71000015 HC POSTOP RECOV 1ST HR: Performed by: ORTHOPAEDIC SURGERY

## 2021-10-06 PROCEDURE — 25000003 PHARM REV CODE 250: Performed by: ORTHOPAEDIC SURGERY

## 2021-10-06 PROCEDURE — D9220A PRA ANESTHESIA: Mod: ANES,,, | Performed by: ANESTHESIOLOGY

## 2021-10-06 PROCEDURE — 36000707: Performed by: ORTHOPAEDIC SURGERY

## 2021-10-06 PROCEDURE — 27201423 OPTIME MED/SURG SUP & DEVICES STERILE SUPPLY: Performed by: ORTHOPAEDIC SURGERY

## 2021-10-06 PROCEDURE — 94761 N-INVAS EAR/PLS OXIMETRY MLT: CPT

## 2021-10-06 PROCEDURE — 37000009 HC ANESTHESIA EA ADD 15 MINS: Performed by: ORTHOPAEDIC SURGERY

## 2021-10-06 PROCEDURE — 25000 PR INCIS TENDON SHEATH,RADIAL STYLOID: ICD-10-PCS | Mod: RT,,, | Performed by: ORTHOPAEDIC SURGERY

## 2021-10-06 PROCEDURE — 25000003 PHARM REV CODE 250: Performed by: NURSE ANESTHETIST, CERTIFIED REGISTERED

## 2021-10-06 PROCEDURE — 25000 INCISION OF TENDON SHEATH: CPT | Mod: RT,,, | Performed by: ORTHOPAEDIC SURGERY

## 2021-10-06 PROCEDURE — D9220A PRA ANESTHESIA: ICD-10-PCS | Mod: CRNA,,, | Performed by: NURSE ANESTHETIST, CERTIFIED REGISTERED

## 2021-10-06 PROCEDURE — 25000003 PHARM REV CODE 250: Performed by: STUDENT IN AN ORGANIZED HEALTH CARE EDUCATION/TRAINING PROGRAM

## 2021-10-06 PROCEDURE — 63600175 PHARM REV CODE 636 W HCPCS: Performed by: NURSE ANESTHETIST, CERTIFIED REGISTERED

## 2021-10-06 PROCEDURE — D9220A PRA ANESTHESIA: Mod: CRNA,,, | Performed by: NURSE ANESTHETIST, CERTIFIED REGISTERED

## 2021-10-06 PROCEDURE — 99900035 HC TECH TIME PER 15 MIN (STAT)

## 2021-10-06 RX ORDER — ONDANSETRON 2 MG/ML
4 INJECTION INTRAMUSCULAR; INTRAVENOUS ONCE AS NEEDED
Status: DISCONTINUED | OUTPATIENT
Start: 2021-10-06 | End: 2021-10-06 | Stop reason: HOSPADM

## 2021-10-06 RX ORDER — LIDOCAINE HYDROCHLORIDE 20 MG/ML
INJECTION INTRAVENOUS
Status: DISCONTINUED | OUTPATIENT
Start: 2021-10-06 | End: 2021-10-06

## 2021-10-06 RX ORDER — MIDAZOLAM HYDROCHLORIDE 1 MG/ML
INJECTION, SOLUTION INTRAMUSCULAR; INTRAVENOUS
Status: DISCONTINUED | OUTPATIENT
Start: 2021-10-06 | End: 2021-10-06

## 2021-10-06 RX ORDER — ACETAMINOPHEN 500 MG
1000 TABLET ORAL
Status: COMPLETED | OUTPATIENT
Start: 2021-10-06 | End: 2021-10-06

## 2021-10-06 RX ORDER — LIDOCAINE HYDROCHLORIDE 10 MG/ML
INJECTION, SOLUTION EPIDURAL; INFILTRATION; INTRACAUDAL; PERINEURAL
Status: DISCONTINUED | OUTPATIENT
Start: 2021-10-06 | End: 2021-10-06 | Stop reason: HOSPADM

## 2021-10-06 RX ORDER — PROPOFOL 10 MG/ML
VIAL (ML) INTRAVENOUS
Status: DISCONTINUED | OUTPATIENT
Start: 2021-10-06 | End: 2021-10-06

## 2021-10-06 RX ORDER — LIDOCAINE HYDROCHLORIDE 10 MG/ML
1 INJECTION, SOLUTION EPIDURAL; INFILTRATION; INTRACAUDAL; PERINEURAL ONCE
Status: DISCONTINUED | OUTPATIENT
Start: 2021-10-06 | End: 2021-10-06 | Stop reason: HOSPADM

## 2021-10-06 RX ORDER — SODIUM CHLORIDE 9 MG/ML
INJECTION, SOLUTION INTRAVENOUS CONTINUOUS
Status: DISCONTINUED | OUTPATIENT
Start: 2021-10-06 | End: 2021-10-06 | Stop reason: HOSPADM

## 2021-10-06 RX ORDER — PROPOFOL 10 MG/ML
VIAL (ML) INTRAVENOUS CONTINUOUS PRN
Status: DISCONTINUED | OUTPATIENT
Start: 2021-10-06 | End: 2021-10-06

## 2021-10-06 RX ORDER — CELECOXIB 200 MG/1
400 CAPSULE ORAL ONCE
Status: COMPLETED | OUTPATIENT
Start: 2021-10-06 | End: 2021-10-06

## 2021-10-06 RX ORDER — ONDANSETRON 2 MG/ML
INJECTION INTRAMUSCULAR; INTRAVENOUS
Status: DISCONTINUED | OUTPATIENT
Start: 2021-10-06 | End: 2021-10-06

## 2021-10-06 RX ORDER — FENTANYL CITRATE 50 UG/ML
25 INJECTION, SOLUTION INTRAMUSCULAR; INTRAVENOUS EVERY 5 MIN PRN
Status: DISCONTINUED | OUTPATIENT
Start: 2021-10-06 | End: 2021-10-06 | Stop reason: HOSPADM

## 2021-10-06 RX ORDER — SODIUM CHLORIDE 0.9 % (FLUSH) 0.9 %
3 SYRINGE (ML) INJECTION
Status: DISCONTINUED | OUTPATIENT
Start: 2021-10-06 | End: 2021-10-06 | Stop reason: HOSPADM

## 2021-10-06 RX ORDER — MUPIROCIN 20 MG/G
OINTMENT TOPICAL
Status: DISCONTINUED | OUTPATIENT
Start: 2021-10-06 | End: 2021-10-06 | Stop reason: HOSPADM

## 2021-10-06 RX ADMIN — MIDAZOLAM HYDROCHLORIDE 1 MG: 1 INJECTION, SOLUTION INTRAMUSCULAR; INTRAVENOUS at 06:10

## 2021-10-06 RX ADMIN — PROPOFOL 50 MG: 10 INJECTION, EMULSION INTRAVENOUS at 07:10

## 2021-10-06 RX ADMIN — ACETAMINOPHEN 1000 MG: 500 TABLET ORAL at 05:10

## 2021-10-06 RX ADMIN — SODIUM CHLORIDE: 0.9 INJECTION, SOLUTION INTRAVENOUS at 06:10

## 2021-10-06 RX ADMIN — PROPOFOL 50 MCG/KG/MIN: 10 INJECTION, EMULSION INTRAVENOUS at 07:10

## 2021-10-06 RX ADMIN — ONDANSETRON 4 MG: 2 INJECTION, SOLUTION INTRAMUSCULAR; INTRAVENOUS at 07:10

## 2021-10-06 RX ADMIN — CELECOXIB 400 MG: 200 CAPSULE ORAL at 05:10

## 2021-10-06 RX ADMIN — LIDOCAINE HYDROCHLORIDE 75 MG: 20 INJECTION, SOLUTION INTRAVENOUS at 07:10

## 2021-10-06 RX ADMIN — MUPIROCIN: 20 OINTMENT TOPICAL at 05:10

## 2021-10-19 ENCOUNTER — OFFICE VISIT (OUTPATIENT)
Dept: ORTHOPEDICS | Facility: CLINIC | Age: 66
End: 2021-10-19
Payer: MEDICARE

## 2021-10-19 DIAGNOSIS — Z09 FOLLOW-UP EXAMINATION AFTER ORTHOPEDIC SURGERY: Primary | ICD-10-CM

## 2021-10-19 PROCEDURE — 99999 PR PBB SHADOW E&M-EST. PATIENT-LVL II: ICD-10-PCS | Mod: PBBFAC,,, | Performed by: PHYSICIAN ASSISTANT

## 2021-10-19 PROCEDURE — 99024 PR POST-OP FOLLOW-UP VISIT: ICD-10-PCS | Mod: POP,,, | Performed by: PHYSICIAN ASSISTANT

## 2021-10-19 PROCEDURE — 99024 POSTOP FOLLOW-UP VISIT: CPT | Mod: POP,,, | Performed by: PHYSICIAN ASSISTANT

## 2021-10-19 PROCEDURE — 99999 PR PBB SHADOW E&M-EST. PATIENT-LVL II: CPT | Mod: PBBFAC,,, | Performed by: PHYSICIAN ASSISTANT

## 2021-10-19 PROCEDURE — 99212 OFFICE O/P EST SF 10 MIN: CPT | Mod: PBBFAC | Performed by: PHYSICIAN ASSISTANT

## 2021-10-27 ENCOUNTER — PATIENT MESSAGE (OUTPATIENT)
Dept: INTERNAL MEDICINE | Facility: CLINIC | Age: 66
End: 2021-10-27

## 2021-10-27 ENCOUNTER — LAB VISIT (OUTPATIENT)
Dept: LAB | Facility: HOSPITAL | Age: 66
End: 2021-10-27
Attending: INTERNAL MEDICINE
Payer: MEDICARE

## 2021-10-27 ENCOUNTER — PATIENT MESSAGE (OUTPATIENT)
Dept: PHARMACY | Facility: CLINIC | Age: 66
End: 2021-10-27
Payer: MEDICARE

## 2021-10-27 ENCOUNTER — IMMUNIZATION (OUTPATIENT)
Dept: INTERNAL MEDICINE | Facility: CLINIC | Age: 66
End: 2021-10-27
Payer: MEDICARE

## 2021-10-27 ENCOUNTER — OFFICE VISIT (OUTPATIENT)
Dept: INTERNAL MEDICINE | Facility: CLINIC | Age: 66
End: 2021-10-27
Payer: MEDICARE

## 2021-10-27 VITALS
BODY MASS INDEX: 18.61 KG/M2 | OXYGEN SATURATION: 98 % | HEIGHT: 72 IN | WEIGHT: 137.38 LBS | SYSTOLIC BLOOD PRESSURE: 110 MMHG | HEART RATE: 74 BPM | DIASTOLIC BLOOD PRESSURE: 74 MMHG

## 2021-10-27 DIAGNOSIS — Z13.6 ENCOUNTER FOR SCREENING FOR CARDIOVASCULAR DISORDERS: ICD-10-CM

## 2021-10-27 DIAGNOSIS — Z12.31 ENCOUNTER FOR SCREENING MAMMOGRAM FOR MALIGNANT NEOPLASM OF BREAST: ICD-10-CM

## 2021-10-27 DIAGNOSIS — Z00.00 ROUTINE PHYSICAL EXAMINATION: ICD-10-CM

## 2021-10-27 DIAGNOSIS — Z86.16 HISTORY OF COVID-19: ICD-10-CM

## 2021-10-27 DIAGNOSIS — R73.09 ELEVATED GLUCOSE LEVEL: ICD-10-CM

## 2021-10-27 DIAGNOSIS — R31.9 HEMATURIA, UNSPECIFIED TYPE: ICD-10-CM

## 2021-10-27 DIAGNOSIS — I83.93 VARICOSE VEINS OF BOTH LOWER EXTREMITIES, UNSPECIFIED WHETHER COMPLICATED: Primary | ICD-10-CM

## 2021-10-27 DIAGNOSIS — M81.0 OSTEOPOROSIS WITHOUT CURRENT PATHOLOGICAL FRACTURE, UNSPECIFIED OSTEOPOROSIS TYPE: ICD-10-CM

## 2021-10-27 DIAGNOSIS — G47.00 INSOMNIA, UNSPECIFIED TYPE: ICD-10-CM

## 2021-10-27 LAB
BILIRUB UR QL STRIP: NEGATIVE
CLARITY UR REFRACT.AUTO: ABNORMAL
COLOR UR AUTO: YELLOW
GLUCOSE UR QL STRIP: NEGATIVE
HGB UR QL STRIP: NEGATIVE
KETONES UR QL STRIP: NEGATIVE
LEUKOCYTE ESTERASE UR QL STRIP: NEGATIVE
NITRITE UR QL STRIP: NEGATIVE
PH UR STRIP: 5 [PH] (ref 5–8)
PROT UR QL STRIP: NEGATIVE
SP GR UR STRIP: 1.02 (ref 1–1.03)
URN SPEC COLLECT METH UR: ABNORMAL

## 2021-10-27 PROCEDURE — 87077 CULTURE AEROBIC IDENTIFY: CPT | Performed by: INTERNAL MEDICINE

## 2021-10-27 PROCEDURE — G0008 ADMIN INFLUENZA VIRUS VAC: HCPCS | Mod: PBBFAC

## 2021-10-27 PROCEDURE — 87086 URINE CULTURE/COLONY COUNT: CPT | Performed by: INTERNAL MEDICINE

## 2021-10-27 PROCEDURE — 81003 URINALYSIS AUTO W/O SCOPE: CPT | Performed by: INTERNAL MEDICINE

## 2021-10-27 PROCEDURE — 90694 VACC AIIV4 NO PRSRV 0.5ML IM: CPT | Mod: PBBFAC

## 2021-10-27 PROCEDURE — 99999 PR PBB SHADOW E&M-EST. PATIENT-LVL V: ICD-10-PCS | Mod: PBBFAC,,, | Performed by: INTERNAL MEDICINE

## 2021-10-27 PROCEDURE — 99214 PR OFFICE/OUTPT VISIT, EST, LEVL IV, 30-39 MIN: ICD-10-PCS | Mod: S$PBB,,, | Performed by: INTERNAL MEDICINE

## 2021-10-27 PROCEDURE — 99215 OFFICE O/P EST HI 40 MIN: CPT | Mod: PBBFAC | Performed by: INTERNAL MEDICINE

## 2021-10-27 PROCEDURE — 87088 URINE BACTERIA CULTURE: CPT | Performed by: INTERNAL MEDICINE

## 2021-10-27 PROCEDURE — 87186 SC STD MICRODIL/AGAR DIL: CPT | Performed by: INTERNAL MEDICINE

## 2021-10-27 PROCEDURE — 99214 OFFICE O/P EST MOD 30 MIN: CPT | Mod: S$PBB,,, | Performed by: INTERNAL MEDICINE

## 2021-10-27 PROCEDURE — 99999 PR PBB SHADOW E&M-EST. PATIENT-LVL V: CPT | Mod: PBBFAC,,, | Performed by: INTERNAL MEDICINE

## 2021-10-27 RX ORDER — ZOLPIDEM TARTRATE 5 MG/1
5 TABLET ORAL NIGHTLY PRN
Qty: 30 TABLET | Refills: 1 | Status: SHIPPED | OUTPATIENT
Start: 2021-10-27 | End: 2022-08-15 | Stop reason: SDUPTHER

## 2021-10-31 ENCOUNTER — PATIENT MESSAGE (OUTPATIENT)
Dept: INTERNAL MEDICINE | Facility: CLINIC | Age: 66
End: 2021-10-31
Payer: MEDICARE

## 2021-11-01 ENCOUNTER — PATIENT MESSAGE (OUTPATIENT)
Dept: INTERNAL MEDICINE | Facility: CLINIC | Age: 66
End: 2021-11-01
Payer: MEDICARE

## 2021-11-01 LAB — BACTERIA UR CULT: ABNORMAL

## 2021-11-01 RX ORDER — AMOXICILLIN AND CLAVULANATE POTASSIUM 875; 125 MG/1; MG/1
1 TABLET, FILM COATED ORAL 2 TIMES DAILY
Qty: 14 TABLET | Refills: 0 | Status: SHIPPED | OUTPATIENT
Start: 2021-11-01 | End: 2021-11-02 | Stop reason: SDUPTHER

## 2021-11-02 ENCOUNTER — OFFICE VISIT (OUTPATIENT)
Dept: ORTHOPEDICS | Facility: CLINIC | Age: 66
End: 2021-11-02
Payer: MEDICARE

## 2021-11-02 ENCOUNTER — HOSPITAL ENCOUNTER (OUTPATIENT)
Dept: RADIOLOGY | Facility: HOSPITAL | Age: 66
Discharge: HOME | End: 2021-11-02
Attending: PHYSICIAN ASSISTANT
Payer: MEDICARE

## 2021-11-02 DIAGNOSIS — M25.511 RIGHT SHOULDER PAIN, UNSPECIFIED CHRONICITY: ICD-10-CM

## 2021-11-02 DIAGNOSIS — M25.511 RIGHT SHOULDER PAIN, UNSPECIFIED CHRONICITY: Primary | ICD-10-CM

## 2021-11-02 PROCEDURE — 99999 PR PBB SHADOW E&M-EST. PATIENT-LVL II: CPT | Mod: PBBFAC,,, | Performed by: PHYSICIAN ASSISTANT

## 2021-11-02 PROCEDURE — 99213 OFFICE O/P EST LOW 20 MIN: CPT | Mod: S$PBB,,, | Performed by: PHYSICIAN ASSISTANT

## 2021-11-02 PROCEDURE — 73030 XR SHOULDER COMPLETE 2 OR MORE VIEWS RIGHT: ICD-10-PCS | Mod: 26,RT,, | Performed by: RADIOLOGY

## 2021-11-02 PROCEDURE — 73030 X-RAY EXAM OF SHOULDER: CPT | Mod: 26,RT,, | Performed by: RADIOLOGY

## 2021-11-02 PROCEDURE — 73030 X-RAY EXAM OF SHOULDER: CPT | Mod: TC,RT

## 2021-11-02 PROCEDURE — 99213 PR OFFICE/OUTPT VISIT, EST, LEVL III, 20-29 MIN: ICD-10-PCS | Mod: S$PBB,,, | Performed by: PHYSICIAN ASSISTANT

## 2021-11-02 PROCEDURE — 99212 OFFICE O/P EST SF 10 MIN: CPT | Mod: PBBFAC | Performed by: PHYSICIAN ASSISTANT

## 2021-11-02 PROCEDURE — 99999 PR PBB SHADOW E&M-EST. PATIENT-LVL II: ICD-10-PCS | Mod: PBBFAC,,, | Performed by: PHYSICIAN ASSISTANT

## 2021-11-02 RX ORDER — NAPROXEN 500 MG/1
500 TABLET ORAL 2 TIMES DAILY WITH MEALS
Qty: 28 TABLET | Refills: 0 | Status: SHIPPED | OUTPATIENT
Start: 2021-11-02 | End: 2021-12-02

## 2021-11-02 RX ORDER — AMOXICILLIN AND CLAVULANATE POTASSIUM 875; 125 MG/1; MG/1
1 TABLET, FILM COATED ORAL 2 TIMES DAILY
Qty: 14 TABLET | Refills: 0 | Status: SHIPPED | OUTPATIENT
Start: 2021-11-02 | End: 2021-11-09

## 2021-11-23 ENCOUNTER — OFFICE VISIT (OUTPATIENT)
Dept: SPORTS MEDICINE | Facility: CLINIC | Age: 66
End: 2021-11-23
Payer: MEDICARE

## 2021-11-23 VITALS
WEIGHT: 137 LBS | BODY MASS INDEX: 18.56 KG/M2 | SYSTOLIC BLOOD PRESSURE: 146 MMHG | HEIGHT: 72 IN | DIASTOLIC BLOOD PRESSURE: 78 MMHG | HEART RATE: 65 BPM

## 2021-11-23 DIAGNOSIS — M99.03 SOMATIC DYSFUNCTION OF LUMBAR REGION: ICD-10-CM

## 2021-11-23 DIAGNOSIS — M99.01 CERVICAL (NECK) REGION SOMATIC DYSFUNCTION: ICD-10-CM

## 2021-11-23 DIAGNOSIS — M99.08 SOMATIC DYSFUNCTION OF RIB CAGE REGION: ICD-10-CM

## 2021-11-23 DIAGNOSIS — M19.011 PRIMARY OSTEOARTHRITIS OF RIGHT SHOULDER: ICD-10-CM

## 2021-11-23 DIAGNOSIS — M79.10 MYALGIA: ICD-10-CM

## 2021-11-23 DIAGNOSIS — M99.09 SOMATIC DYSFUNCTION OF ABDOMINAL REGION: ICD-10-CM

## 2021-11-23 DIAGNOSIS — M99.00 SOMATIC DYSFUNCTION OF HEAD REGION: ICD-10-CM

## 2021-11-23 DIAGNOSIS — M99.02 SOMATIC DYSFUNCTION OF THORACIC REGION: ICD-10-CM

## 2021-11-23 DIAGNOSIS — M75.41 IMPINGEMENT SYNDROME OF RIGHT SHOULDER: Primary | ICD-10-CM

## 2021-11-23 DIAGNOSIS — M99.07 UPPER EXTREMITY SOMATIC DYSFUNCTION: ICD-10-CM

## 2021-11-23 DIAGNOSIS — R29.3 POOR POSTURE: ICD-10-CM

## 2021-11-23 PROCEDURE — 99999 PR PBB SHADOW E&M-EST. PATIENT-LVL III: CPT | Mod: PBBFAC,,, | Performed by: NEUROMUSCULOSKELETAL MEDICINE & OMM

## 2021-11-23 PROCEDURE — 97110 PR THERAPEUTIC EXERCISES: ICD-10-PCS | Mod: GP,,, | Performed by: NEUROMUSCULOSKELETAL MEDICINE & OMM

## 2021-11-23 PROCEDURE — 99999 PR PBB SHADOW E&M-EST. PATIENT-LVL III: ICD-10-PCS | Mod: PBBFAC,,, | Performed by: NEUROMUSCULOSKELETAL MEDICINE & OMM

## 2021-11-23 PROCEDURE — 99213 OFFICE O/P EST LOW 20 MIN: CPT | Mod: PBBFAC | Performed by: NEUROMUSCULOSKELETAL MEDICINE & OMM

## 2021-11-23 PROCEDURE — 97110 THERAPEUTIC EXERCISES: CPT | Mod: GP,,, | Performed by: NEUROMUSCULOSKELETAL MEDICINE & OMM

## 2021-11-23 PROCEDURE — 98928 PR OSTEOPATHIC MANIP,7-8 BODY REGN: ICD-10-PCS | Mod: S$PBB,,, | Performed by: NEUROMUSCULOSKELETAL MEDICINE & OMM

## 2021-11-23 PROCEDURE — 98928 OSTEOPATH MANJ 7-8 REGIONS: CPT | Mod: PBBFAC | Performed by: NEUROMUSCULOSKELETAL MEDICINE & OMM

## 2021-11-23 PROCEDURE — 99204 PR OFFICE/OUTPT VISIT, NEW, LEVL IV, 45-59 MIN: ICD-10-PCS | Mod: 25,S$PBB,, | Performed by: NEUROMUSCULOSKELETAL MEDICINE & OMM

## 2021-11-23 PROCEDURE — 99204 OFFICE O/P NEW MOD 45 MIN: CPT | Mod: 25,S$PBB,, | Performed by: NEUROMUSCULOSKELETAL MEDICINE & OMM

## 2021-11-23 PROCEDURE — 98928 OSTEOPATH MANJ 7-8 REGIONS: CPT | Mod: S$PBB,,, | Performed by: NEUROMUSCULOSKELETAL MEDICINE & OMM

## 2021-11-30 ENCOUNTER — OFFICE VISIT (OUTPATIENT)
Dept: ORTHOPEDICS | Facility: CLINIC | Age: 66
End: 2021-11-30
Payer: MEDICARE

## 2021-11-30 VITALS — HEIGHT: 72 IN | WEIGHT: 137 LBS | BODY MASS INDEX: 18.56 KG/M2

## 2021-11-30 DIAGNOSIS — M25.531 RIGHT WRIST PAIN: Primary | ICD-10-CM

## 2021-11-30 PROCEDURE — 99024 PR POST-OP FOLLOW-UP VISIT: ICD-10-PCS | Mod: POP,,, | Performed by: ORTHOPAEDIC SURGERY

## 2021-11-30 PROCEDURE — 99212 OFFICE O/P EST SF 10 MIN: CPT | Mod: PBBFAC | Performed by: ORTHOPAEDIC SURGERY

## 2021-11-30 PROCEDURE — 99024 POSTOP FOLLOW-UP VISIT: CPT | Mod: POP,,, | Performed by: ORTHOPAEDIC SURGERY

## 2021-11-30 PROCEDURE — 99999 PR PBB SHADOW E&M-EST. PATIENT-LVL II: ICD-10-PCS | Mod: PBBFAC,,, | Performed by: ORTHOPAEDIC SURGERY

## 2021-11-30 PROCEDURE — 99999 PR PBB SHADOW E&M-EST. PATIENT-LVL II: CPT | Mod: PBBFAC,,, | Performed by: ORTHOPAEDIC SURGERY

## 2021-12-15 ENCOUNTER — TELEPHONE (OUTPATIENT)
Dept: SPORTS MEDICINE | Facility: CLINIC | Age: 66
End: 2021-12-15
Payer: MEDICARE

## 2021-12-15 ENCOUNTER — PATIENT MESSAGE (OUTPATIENT)
Dept: SPORTS MEDICINE | Facility: CLINIC | Age: 66
End: 2021-12-15
Payer: MEDICARE

## 2022-01-08 NOTE — PROGRESS NOTES
Subjective:     Martha Mcfarlane     Chief Complaint   Patient presents with    Right Shoulder - Pain       HPI      Martha is a 66 y.o. female coming in today for right shoulder pain. Since last visit the pain has Improved. Pt reports difficulty with Nep[place]e and [place] exercises- pain was so bad she got nauseous, but this has now improved. Overall improving but worsening pain with cooking over [place]. Having less sharp pains. Compliant with HEP. The pain is better with rest, naprosyn, aleve and worse with activity, reaching into backseat, reaching across body. Pt. describes the pain as a 5/10 achy pain that does radiate to her upper arm. There has not been any new a fall/injury/ or traumas since last visit.  Pt. denies any new musculoskeletal complaints at this time.     Office note from 11/23/21 reviewed     Joint instability? no  Mechanical locking/clicking? no   Affecting ADL's? yes  Affecting sleep? yes      Review of Systems   Constitutional: Negative for chills and fever.   Musculoskeletal: Positive for joint pain and myalgias. Negative for back pain, falls and neck pain.   Neurological: Negative for dizziness, tingling, focal weakness, weakness and headaches.       PAST MEDICAL HISTORY:   Past Medical History:   Diagnosis Date    Anemia     Constipation - functional     Encounter for blood transfusion     Kidney calculi     Melanoma     Mitral valve prolapse     Osteoporosis, unspecified     Stroke     tia     PAST SURGICAL HISTORY:   Past Surgical History:   Procedure Laterality Date    APPENDECTOMY      COLON SURGERY      COLONOSCOPY      COLONOSCOPY N/A 12/15/2015    Procedure: COLONOSCOPY;  Surgeon: ADALBERTO Liao MD;  Location: [address]);  Service: Endoscopy;  Laterality: N/A;    DE QUERVAIN'S RELEASE Right 10/6/2021    Procedure: RELEASE, HAND, FOR DEQUERVAIN'S TENOSYNOVITIS - right;  Surgeon: Aric Leonard MD;  Location: [facility];  Service: Orthopedics;  Laterality:  Right;    ESOPHAGOGASTRODUODENOSCOPY N/A 4/8/2020    Procedure: EGD (ESOPHAGOGASTRODUODENOSCOPY);  Surgeon: Jonathan Roth MD;  Location: [address];  Service: Endoscopy;  Laterality: N/A;  check stat hct before     labs will be drawn prior-GT    HYSTERECTOMY      INTUSSUSCEPTION REPAIR      LASIK      LEFT COLECTOMY      melanoma on face removal      PARTIAL HYSTERECTOMY       FAMILY HISTORY:   Family History   Problem Relation Age of Onset    Diabetes Father     Diabetes Brother     Hepatitis Mother      SOCIAL HISTORY:   Social History     Socioeconomic History    Marital status:    Tobacco Use    Smoking status: Never Smoker    Smokeless tobacco: Never Used   Substance and Sexual Activity    Alcohol use: No    Drug use: No     Social Determinants of Health     Financial Resource Strain: Low Risk     Difficulty of Paying Living Expenses: Not very hard   Food Insecurity: No Food Insecurity    Worried About Running Out of Food in the Last Year: Never true    Ran Out of Food in the Last Year: Never true   Transportation Needs: No Transportation Needs    Lack of Transportation (Medical): No    Lack of Transportation (Non-Medical): No   Physical Activity: Insufficiently Active    Days of Exercise per Week: 2 days    Minutes of Exercise per Session: 40 min   Stress: No Stress Concern Present    Feeling of Stress : Not at all   Social Connections: Unknown    Frequency of Communication with Friends and Family: More than three times a week    Frequency of Social Gatherings with Friends and Family: More than three times a week    Active Member of Clubs or Organizations: Yes    Attends Club or Organization Meetings: More than 4 times per year    Marital Status:    Housing Stability: Low Risk     Unable to Pay for Housing in the Last Year: No    Number of Places Lived in the Last Year: 1    Unstable Housing in the Last Year: No       MEDICATIONS:   Current Outpatient  "Medications:     ascorbic acid, vitamin C, (VITAMIN C) 500 MG tablet, Take 500 mg by mouth once daily., Disp: , Rfl:     CALCIUM CARBONATE/VITAMIN D3 (VITAMIN D-3 ORAL), Take 1 tablet by mouth 2 (two) times daily. , Disp: , Rfl:     fish oil-omega-3 fatty acids 300-1,000 mg capsule, Take 2 g by mouth once daily., Disp: , Rfl:     magnesium 30 mg Tab, Take 1 tablet by mouth once daily. , Disp: , Rfl:     meloxicam (MOBIC) 7.5 MG tablet, Take 1 tablet (7.5 mg total) by mouth once daily. Take with food, Disp: 30 tablet, Rfl: 0    zolpidem (AMBIEN) 5 MG Tab, Take 1 tablet (5 mg total) by mouth nightly as needed (insomnia)., Disp: 30 tablet, Rfl: 1  ALLERGIES:   Review of patient's allergies indicates:   Allergen Reactions    Orange flavor      Severe headache, nausea    Iodine and iodide containing products     Shellfish containing products     Benadryl [diphenhydramine hcl] Swelling     Reviewed office visit on 11/2/21 with Joy Blanchard PA-C: intermittent right shoulder pain since after her hand surgery on 10-6-21 with Dr. Leonard ( Right first dorsal compartment release). She will take naproxen BID x 1 week then prn. Will refer her to sports medicine DO for eval and tx recommendations.     X-ray Shoulder 2 or More Views Right 11/02/2021  Narrative  EXAMINATION:  XR SHOULDER COMPLETE 2 OR MORE VIEWS RIGHT  CLINICAL HISTORY:  Pain in right shoulder  TECHNIQUE:  Four views of the right shoulder.  COMPARISON:  None  FINDINGS:  Bones: There is no evidence of fracture.  Joints: No evidence for glenohumeral dislocation.  Mild degenerative changes are noted at the right acromioclavicular joint.  Mild spurring of the inferior glenoid.  Soft tissues: Unremarkable.  Impression  No acute findings. Mild DJD changes of the AC and glenohumeral joints.      Objective:     VITAL SIGNS: /70   Pulse 64   Ht 6' 1" (1.854 m)   Wt 65.8 kg (145 lb)   BMI 19.13 kg/m²    General    Vitals reviewed.  Constitutional: She is " oriented to person, place, and time. She appears well-developed and well-nourished.   Neurological: She is alert and oriented to person, place, and time.   Psychiatric: She has a normal mood and affect. Her behavior is normal.               MUSCULOSKELETAL EXAM  Shoulder: right SHOULDER  The affected shoulder is compared to the contralateral shoulder.    Observation:      SHOULDER  No ecchymosis, edema, or erythema throughout the shoulder girdle.  No sternal, clavicular, or acromial deformities bilaterally.  No atrophy of the pectorals, deltoids, supraspinatus, infraspinatus, or biceps bilaterally.  No asymmetry of shoulders bilaterally. Bilateral anterior shoulder girdle.     Posture:  Increased thoracic kyphosis and Anterior head carriage  Gait: Non-antalgic     ROM (* = with pain):  CERVICAL SPINE  Full AROM in flexion, extension, sidebending, and rotation.    SHOULDER  Active flexion to 180° on left and 170°* on right.   Active abduction to 180° on left and 170°* on right.  Active internal rotation to T5 on left and T9* on right.    Active external rotation to T4 on left and T4 on right.    Scapular dyskinesia noted without winging    Tenderness:  No tenderness at the SC or AC joint  No tenderness over the clavicle   No tenderness over biceps tendon or bicipital groove  + tenderness over subacromial space with full shoulder flexion    Strength Testing (* = with pain):  Deltoid - 5/5 on left and 5/5* on right  Biceps - 5/5 on left and 5/5 on right  Triceps - 5/5 on left and 5/5 on right  Wrist extension - 5/5 on left and 5/5 on right  Wrist flexion - 5/5 on left and 5/5 on right   - 5/5 on left and 5/5 on right  Finger extension - 5/5 on left and 5/5 on right  Finger abduction - 5/5 on left and 5/5 on right    Special Tests:  Empty can test - negative  Full can test - negative  Bear hug test - negative  Belly press test - negative  Resisted internal rotation - negative  Resisted external rotation -  negative    Neer's test - positive  Hawkin's-Jose Juan test - negative  Cross-arm test- negative    OTejinders test - negative    Sulcus sign - none  AP load and shift laxity - none  Anterior apprehension test - negative  Posterior apprehension test - negative    Speed's test - negative  Yergason's test - negative    Neurovascular Exam:  Spurlings test - negative bialterally  2+ radial pulses bilaterally  Sensation intact to light touch in the distal median, radial, and ulnar nerve distributions bilaterally.  Capillary refill intact <2 seconds in all digits bilaterally    TART (Tissue texture abnormality, Asymmetry,  Restriction of motion and/or Tenderness) changes:    Head: Occipitoatlantal (OA) Joint ES-left, R-right     Cervical Spine   C1 TTA RIGHT   C2 TTA RIGHT   C3 Neutral   C4 Neutral   C5 Neutral   C6 Neutral   C7 ERS LEFT      Thoracic Spine   T1 FRS LEFT   T2 Neutral   T3 Neutral   T4 Neutral   T5 Neutral   T6 FRS RIGHT   T7 FRS RIGHT   T8 FRS RIGHT   T9 Neutral   T10 Neutral   T11   Neutral   T12 Neutral     Rib cage: R1 inhaled on right, R6 external torsion on right    Upper extremity: Right thoracic outlet TTA, Right serratus anterior  Herniated trigger point (HTP) fascial distortion       Key   F= Flexed   E = Extended   R = Rotated   S = Sidebent   TTA = tissue texture abnormality       Assessment:      Encounter Diagnoses   Name Primary?    Impingement syndrome of right shoulder Yes    Poor posture     Primary osteoarthritis of right shoulder     Myalgia     Somatic dysfunction of head region     Somatic dysfunction of thoracic region     Somatic dysfunction of rib cage region     Cervical (neck) region somatic dysfunction     Upper extremity somatic dysfunction           Plan:   1. Right shoulder pain secondary to impingement from poor thoracic and scapular biomechanics, poor posture, and recent surgical positioning irritation- improved, but pain still present with certain positioning.   Underlying mild right glenohumeral and acromioclavicular joint DJD changes also noted previous right shoulder x-rays.  - Rx given for Meloxicam 7.5  mg po 1-2 tabs qday x 14 days then prn for pain control. Pt. Advised to avoid all other NSAIDS while on this medication.  - Referral to outpatient PT ([facility])  for scapular stabilization and postural retraining  - Recommend Ice up to 20 minutes at a time prn for pain control  - OMT performed today to address associated biomechanical and fascial restrictions  and HEP started.   -   Future planning includes possible ultrasound-guided subacromial CSI if symptoms persist  -  X-ray images of right shoulder taken 11/2/21 (AP, scapular Y, and axillary views) showed no acute findings. Mild DJD changes of the AC and glenohumeral joints.     2. OMT 5-6 regions. Oral consent obtained.  Reviewed benefits and potential side effects.   - OMT indicated today due to signs and symptoms as well as local and remote somatic dysfunction findings and their related neurokinetic, lymphatic, fascial and/or arteriovenous body connections.   - OMT techniques used: Myofascial Release, Muscle Energy, Still's Technique and Fascial Distortion Model   - Treatment was tolerated well. Improvement noted in segmental mobility post-treatment in dysfunctional regions. There were no adverse events and no complications immediately following treatment.     3.  Reviewed with patient following HEP:   continue:  A)  Seated anterior pelvic tilt exercise: rotate pelvis forward to sit on ischial tuberosities while maintaining neutral shoulder positioning. Repeat frequently throughout the day.   B) Corner stretch: hold for 30 seconds, repeat 2-3 times, twice daily. Handout also given.   C)  Supine Thoracic extension exercise: 10-15 reps, twice daily. Handout also given.   D) Cervicothoracic junction mobilization exercise: 10-15 reps, twice daily. Handout also given.   E) Diaphragmatic  breathing exercises: 10-15 reps of inhalation and exhalation, focusing inhaling into the abdomen and lower ribs. Repeat 2-3 times a day. Handout given.   F) static triceps exercise stretching:  Hold stretch for 30 seconds, repeat 2-3 times, twice daily.  Handout given.    Add:  H) Sidelying reach and roll stretch: Laying on your side with arms extend out, pull back top arm and flex at the elbow, then rotate thoracic spine and open up arm and chest. Repeat for a total of 10 reps on each side, twice daily. Hand-out also given.     08903 HOME EXERCISE PROGRAM (HEP):  The patient was taught a homegoing physical therapy regimen as described above. The patient demonstrated understanding of the exercises and proper technique of their execution. This interaction took 15 minutes.     4. Follow-up upon completion of PT if pain persist or deteriorates    5. Patient agreeable to today's plan and all questions were answered    This note is dictated using the M*Modal Fluency Direct word recognition program. There are word recognition mistakes that are occasionally missed on review.

## 2022-01-11 ENCOUNTER — OFFICE VISIT (OUTPATIENT)
Dept: SPORTS MEDICINE | Facility: CLINIC | Age: 67
End: 2022-01-11
Payer: MEDICARE

## 2022-01-11 VITALS
HEART RATE: 64 BPM | DIASTOLIC BLOOD PRESSURE: 70 MMHG | HEIGHT: 72 IN | SYSTOLIC BLOOD PRESSURE: 120 MMHG | WEIGHT: 145 LBS | BODY MASS INDEX: 19.64 KG/M2

## 2022-01-11 DIAGNOSIS — M99.02 SOMATIC DYSFUNCTION OF THORACIC REGION: ICD-10-CM

## 2022-01-11 DIAGNOSIS — M75.41 IMPINGEMENT SYNDROME OF RIGHT SHOULDER: Primary | ICD-10-CM

## 2022-01-11 DIAGNOSIS — M99.08 SOMATIC DYSFUNCTION OF RIB CAGE REGION: ICD-10-CM

## 2022-01-11 DIAGNOSIS — R29.3 POOR POSTURE: ICD-10-CM

## 2022-01-11 DIAGNOSIS — M99.07 UPPER EXTREMITY SOMATIC DYSFUNCTION: ICD-10-CM

## 2022-01-11 DIAGNOSIS — M99.01 CERVICAL (NECK) REGION SOMATIC DYSFUNCTION: ICD-10-CM

## 2022-01-11 DIAGNOSIS — M19.011 PRIMARY OSTEOARTHRITIS OF RIGHT SHOULDER: ICD-10-CM

## 2022-01-11 DIAGNOSIS — M99.00 SOMATIC DYSFUNCTION OF HEAD REGION: ICD-10-CM

## 2022-01-11 DIAGNOSIS — M79.10 MYALGIA: ICD-10-CM

## 2022-01-11 PROCEDURE — 99213 OFFICE O/P EST LOW 20 MIN: CPT | Mod: PBBFAC | Performed by: NEUROMUSCULOSKELETAL MEDICINE & OMM

## 2022-01-11 PROCEDURE — 98927 OSTEOPATH MANJ 5-6 REGIONS: CPT | Mod: S$PBB,,, | Performed by: NEUROMUSCULOSKELETAL MEDICINE & OMM

## 2022-01-11 PROCEDURE — 99999 PR PBB SHADOW E&M-EST. PATIENT-LVL III: CPT | Mod: PBBFAC,,, | Performed by: NEUROMUSCULOSKELETAL MEDICINE & OMM

## 2022-01-11 PROCEDURE — 97110 THERAPEUTIC EXERCISES: CPT | Mod: GP,,, | Performed by: NEUROMUSCULOSKELETAL MEDICINE & OMM

## 2022-01-11 PROCEDURE — 97110 PR THERAPEUTIC EXERCISES: ICD-10-PCS | Mod: GP,,, | Performed by: NEUROMUSCULOSKELETAL MEDICINE & OMM

## 2022-01-11 PROCEDURE — 99999 PR PBB SHADOW E&M-EST. PATIENT-LVL III: ICD-10-PCS | Mod: PBBFAC,,, | Performed by: NEUROMUSCULOSKELETAL MEDICINE & OMM

## 2022-01-11 PROCEDURE — 99214 OFFICE O/P EST MOD 30 MIN: CPT | Mod: 25,S$PBB,, | Performed by: NEUROMUSCULOSKELETAL MEDICINE & OMM

## 2022-01-11 PROCEDURE — 99214 PR OFFICE/OUTPT VISIT, EST, LEVL IV, 30-39 MIN: ICD-10-PCS | Mod: 25,S$PBB,, | Performed by: NEUROMUSCULOSKELETAL MEDICINE & OMM

## 2022-01-11 PROCEDURE — 98927 PR OSTEOPATHIC MANIP,5-6 BODY REGN: ICD-10-PCS | Mod: S$PBB,,, | Performed by: NEUROMUSCULOSKELETAL MEDICINE & OMM

## 2022-01-11 PROCEDURE — 98927 OSTEOPATH MANJ 5-6 REGIONS: CPT | Mod: PBBFAC | Performed by: NEUROMUSCULOSKELETAL MEDICINE & OMM

## 2022-01-11 RX ORDER — MELOXICAM 7.5 MG/1
7.5 TABLET ORAL DAILY
Qty: 30 TABLET | Refills: 0 | Status: SHIPPED | OUTPATIENT
Start: 2022-01-11 | End: 2022-02-04

## 2022-02-07 ENCOUNTER — PATIENT MESSAGE (OUTPATIENT)
Dept: ENDOCRINOLOGY | Facility: CLINIC | Age: 67
End: 2022-02-07
Payer: MEDICARE

## 2022-02-07 ENCOUNTER — PATIENT MESSAGE (OUTPATIENT)
Dept: INTERNAL MEDICINE | Facility: CLINIC | Age: 67
End: 2022-02-07
Payer: MEDICARE

## 2022-02-07 DIAGNOSIS — M81.0 OSTEOPOROSIS WITHOUT CURRENT PATHOLOGICAL FRACTURE, UNSPECIFIED OSTEOPOROSIS TYPE: Primary | ICD-10-CM

## 2022-02-07 DIAGNOSIS — Z12.31 ENCOUNTER FOR SCREENING MAMMOGRAM FOR MALIGNANT NEOPLASM OF BREAST: Primary | ICD-10-CM

## 2022-03-11 ENCOUNTER — PATIENT MESSAGE (OUTPATIENT)
Dept: ENDOCRINOLOGY | Facility: CLINIC | Age: 67
End: 2022-03-11
Payer: MEDICARE

## 2022-03-19 ENCOUNTER — PATIENT MESSAGE (OUTPATIENT)
Dept: ENDOCRINOLOGY | Facility: CLINIC | Age: 67
End: 2022-03-19
Payer: MEDICARE

## 2022-03-24 ENCOUNTER — PES CALL (OUTPATIENT)
Dept: ADMINISTRATIVE | Facility: CLINIC | Age: 67
End: 2022-03-24
Payer: MEDICARE

## 2022-04-04 NOTE — PROGRESS NOTES
"Subjective:      Patient ID: Martha Mcfarlane is a 66 y.o. female.    Chief Complaint:  No chief complaint on file.      History of Present Illness    Ms. Mcfarlane is a 66 y.o. female who is here for a follow-up visit for evaluation of osteoporosis and hyperparathyroidism with normal calcium levels. Osteoporosis diagnosed with DXA done at [facility]. Last DXA scan demonstrates T score of -3.2 at total hip (LS -2.6 and FN -2.7) (6/2018). Due to multiple side effects to other medications, I recommended risedronate every other week. (she thinks she may have taken it once). But started Bone Strength (OTC) calcium 1000 mg and calcium 770 mg (serving: three tablets). Plant based supplement.      Since her last visit with me, she denies falls or fractures.   Nephrolithiasis - denies new active stones, occasional sees blood in the urine. (2 months ago). Has urine calcium of 265 mg/specimen (range 253 - 265 mg/spec)    Takes calcium 750 mg tid and vitamin D daily    Diet: eats green leafy vegetables, dairy    Previously tried:  Prolia 60 mg due to severe constipation  Reclast 5 mg IV once a yearly X 1 (11/2014) - severe constipation  Tried alendronate - last dose was 2014  Restarted alendronate 7/2017 but stopped it September 2017 due to blood pressure issues.       Previous evaluation included mildly elevated parathyroid hormone levels (81 - 108 pg/ml) with normal calcium. Last PTH level was 77 pg/ml. Normal SPEP and vitamin D levels. Mildly elevated urine calcium.     Exercise: regular exercise including walking  Balance: good.     Review of Systems  No recent illness   Otherwise neg    Objective:   Physical Exam  Vitals:    04/05/22 0926   BP: 120/80   BP Location: Right arm   Patient Position: Sitting   BP Method: Medium (Manual)   Pulse: 89   SpO2: 99%   Weight: 63.6 kg (140 lb 3.4 oz)   Height: 6' 1" (1.854 m)       BP Readings from Last 3 Encounters:   04/05/22 120/80   01/11/22 120/70   11/23/21 (!) " 146/78     Wt Readings from Last 1 Encounters:   04/05/22 0926 63.6 kg (140 lb 3.4 oz)         Body mass index is 18.5 kg/m².    Lab Review:   Lab Results   Component Value Date    HGBA1C 5.0 10/27/2021     Lab Results   Component Value Date    CHOL 153 10/27/2021    HDL 52 10/27/2021    LDLCALC 90.6 10/27/2021    TRIG 52 10/27/2021    CHOLHDL 34.0 10/27/2021     Lab Results   Component Value Date     10/27/2021    K 3.8 10/27/2021     10/27/2021    CO2 30 (H) 10/27/2021    GLU 86 10/27/2021    BUN 20 10/27/2021    CREATININE 0.7 10/27/2021    CALCIUM 9.8 10/27/2021    PROT 7.4 10/27/2021    ALBUMIN 4.3 10/27/2021    BILITOT 0.9 10/27/2021    ALKPHOS 78 10/27/2021    AST 20 10/27/2021    ALT 17 10/27/2021    ANIONGAP 6 (L) 10/27/2021    ESTGFRAFRICA >60.0 10/27/2021    EGFRNONAA >60.0 10/27/2021    TSH 1.130 10/27/2021         Assessment and Plan     Osteoporosis  Risk factors:  race, postmenopausal status, no HRT, nephrolithiasis  Balance, exercise and diet are optimal     Discussed different medications, including oral BPs, prolia and reclast.   Wants to consider reclast which she has had before. Reviewed benefits and risks.   Provided instructions.

## 2022-04-05 ENCOUNTER — OFFICE VISIT (OUTPATIENT)
Dept: ENDOCRINOLOGY | Facility: CLINIC | Age: 67
End: 2022-04-05
Payer: MEDICARE

## 2022-04-05 VITALS
DIASTOLIC BLOOD PRESSURE: 80 MMHG | HEIGHT: 72 IN | HEART RATE: 89 BPM | WEIGHT: 140.19 LBS | OXYGEN SATURATION: 99 % | BODY MASS INDEX: 18.99 KG/M2 | SYSTOLIC BLOOD PRESSURE: 120 MMHG

## 2022-04-05 DIAGNOSIS — M81.0 OSTEOPOROSIS WITHOUT CURRENT PATHOLOGICAL FRACTURE, UNSPECIFIED OSTEOPOROSIS TYPE: ICD-10-CM

## 2022-04-05 PROCEDURE — 99213 OFFICE O/P EST LOW 20 MIN: CPT | Mod: S$PBB,,, | Performed by: INTERNAL MEDICINE

## 2022-04-05 PROCEDURE — 99213 OFFICE O/P EST LOW 20 MIN: CPT | Mod: PBBFAC | Performed by: INTERNAL MEDICINE

## 2022-04-05 PROCEDURE — 99999 PR PBB SHADOW E&M-EST. PATIENT-LVL III: CPT | Mod: PBBFAC,,, | Performed by: INTERNAL MEDICINE

## 2022-04-05 PROCEDURE — 99213 PR OFFICE/OUTPT VISIT, EST, LEVL III, 20-29 MIN: ICD-10-PCS | Mod: S$PBB,,, | Performed by: INTERNAL MEDICINE

## 2022-04-05 PROCEDURE — 99999 PR PBB SHADOW E&M-EST. PATIENT-LVL III: ICD-10-PCS | Mod: PBBFAC,,, | Performed by: INTERNAL MEDICINE

## 2022-04-05 RX ORDER — SODIUM CHLORIDE 0.9 % (FLUSH) 0.9 %
10 SYRINGE (ML) INJECTION
Status: CANCELLED | OUTPATIENT
Start: 2022-04-05

## 2022-04-05 RX ORDER — ZOLEDRONIC ACID 5 MG/100ML
5 INJECTION, SOLUTION INTRAVENOUS
Status: CANCELLED | OUTPATIENT
Start: 2022-04-05

## 2022-04-05 NOTE — PATIENT INSTRUCTIONS
Osteoporosis medications  These are the medications we discussed for osteoporosis treatment:    - Bisphosphonates:         - these slow down bone loss         - oral forms (Fosamax and Actonel are examples) - taken once weekly or once monthly         - IV form (Reclast) - given intravenously once yearly    - Prolia (denosumab):          - slows down bone loss           - it is a subcutaneous injection (under the skin) every 6 months, given in the office    For more information on medications: https://www.nof.org/patients/treatment/medicationadherence/    There are a few things I ask my patients to do before receiving reclast infusion.   1) The day before and day of the infusion please drink plenty of fluids.   2) The day of the infusion take over the counter tylenol one dose every six to eight hours for one day. This minimizes the joint pains that can occur with reclast.  3) Take an extra dose of over the counter vitamin D the day before the infusion.

## 2022-04-05 NOTE — ASSESSMENT & PLAN NOTE
Risk factors:  race, postmenopausal status, no HRT, nephrolithiasis  Balance, exercise and diet are optimal     Discussed different medications, including oral BPs, prolia and reclast.   Wants to consider reclast which she has had before. Reviewed benefits and risks.   Provided instructions.

## 2022-04-06 ENCOUNTER — TELEPHONE (OUTPATIENT)
Dept: INFECTIOUS DISEASES | Facility: HOSPITAL | Age: 67
End: 2022-04-06
Payer: MEDICARE

## 2022-05-11 ENCOUNTER — INFUSION (OUTPATIENT)
Dept: INFECTIOUS DISEASES | Facility: HOSPITAL | Age: 67
End: 2022-05-11
Attending: INTERNAL MEDICINE
Payer: MEDICARE

## 2022-05-11 VITALS
RESPIRATION RATE: 20 BRPM | TEMPERATURE: 98 F | HEIGHT: 72 IN | HEART RATE: 66 BPM | OXYGEN SATURATION: 96 % | BODY MASS INDEX: 18.83 KG/M2 | WEIGHT: 139 LBS

## 2022-05-11 DIAGNOSIS — R82.994 HYPERCALCIURIA: Primary | ICD-10-CM

## 2022-05-11 DIAGNOSIS — M81.0 OSTEOPOROSIS WITHOUT CURRENT PATHOLOGICAL FRACTURE, UNSPECIFIED OSTEOPOROSIS TYPE: ICD-10-CM

## 2022-05-11 PROCEDURE — 96365 THER/PROPH/DIAG IV INF INIT: CPT

## 2022-05-11 PROCEDURE — 63600175 PHARM REV CODE 636 W HCPCS: Performed by: INTERNAL MEDICINE

## 2022-05-11 RX ORDER — SODIUM CHLORIDE 0.9 % (FLUSH) 0.9 %
10 SYRINGE (ML) INJECTION
Status: CANCELLED | OUTPATIENT
Start: 2022-05-11

## 2022-05-11 RX ORDER — ZOLEDRONIC ACID 5 MG/100ML
5 INJECTION, SOLUTION INTRAVENOUS
Status: CANCELLED | OUTPATIENT
Start: 2022-05-11

## 2022-05-11 RX ORDER — ZOLEDRONIC ACID 5 MG/100ML
5 INJECTION, SOLUTION INTRAVENOUS
Status: COMPLETED | OUTPATIENT
Start: 2022-05-11 | End: 2022-05-11

## 2022-05-11 RX ORDER — SODIUM CHLORIDE 0.9 % (FLUSH) 0.9 %
10 SYRINGE (ML) INJECTION
Status: DISCONTINUED | OUTPATIENT
Start: 2022-05-11 | End: 2022-05-11 | Stop reason: HOSPADM

## 2022-05-11 RX ADMIN — ZOLEDRONIC ACID 5 MG: 5 INJECTION, SOLUTION INTRAVENOUS at 11:05

## 2022-08-04 ENCOUNTER — PATIENT MESSAGE (OUTPATIENT)
Dept: INTERNAL MEDICINE | Facility: CLINIC | Age: 67
End: 2022-08-04
Payer: MEDICARE

## 2022-08-05 ENCOUNTER — TELEPHONE (OUTPATIENT)
Dept: INTERNAL MEDICINE | Facility: CLINIC | Age: 67
End: 2022-08-05
Payer: MEDICARE

## 2022-08-15 ENCOUNTER — OFFICE VISIT (OUTPATIENT)
Dept: INTERNAL MEDICINE | Facility: CLINIC | Age: 67
End: 2022-08-15
Payer: MEDICARE

## 2022-08-15 ENCOUNTER — IMMUNIZATION (OUTPATIENT)
Dept: PHARMACY | Facility: CLINIC | Age: 67
End: 2022-08-15
Payer: MEDICARE

## 2022-08-15 VITALS
SYSTOLIC BLOOD PRESSURE: 130 MMHG | OXYGEN SATURATION: 96 % | DIASTOLIC BLOOD PRESSURE: 72 MMHG | HEIGHT: 72 IN | BODY MASS INDEX: 19.26 KG/M2 | WEIGHT: 142.19 LBS | HEART RATE: 75 BPM

## 2022-08-15 DIAGNOSIS — G47.00 INSOMNIA, UNSPECIFIED TYPE: ICD-10-CM

## 2022-08-15 DIAGNOSIS — M79.604 RIGHT LEG PAIN: Primary | ICD-10-CM

## 2022-08-15 DIAGNOSIS — Z23 NEED FOR VACCINATION: Primary | ICD-10-CM

## 2022-08-15 PROCEDURE — 99214 PR OFFICE/OUTPT VISIT, EST, LEVL IV, 30-39 MIN: ICD-10-PCS | Mod: S$PBB,,, | Performed by: INTERNAL MEDICINE

## 2022-08-15 PROCEDURE — 99214 OFFICE O/P EST MOD 30 MIN: CPT | Mod: PBBFAC | Performed by: INTERNAL MEDICINE

## 2022-08-15 PROCEDURE — 99214 OFFICE O/P EST MOD 30 MIN: CPT | Mod: S$PBB,,, | Performed by: INTERNAL MEDICINE

## 2022-08-15 PROCEDURE — 99999 PR PBB SHADOW E&M-EST. PATIENT-LVL IV: CPT | Mod: PBBFAC,,, | Performed by: INTERNAL MEDICINE

## 2022-08-15 PROCEDURE — 99999 PR PBB SHADOW E&M-EST. PATIENT-LVL IV: ICD-10-PCS | Mod: PBBFAC,,, | Performed by: INTERNAL MEDICINE

## 2022-08-15 RX ORDER — ZOLPIDEM TARTRATE 5 MG/1
5 TABLET ORAL NIGHTLY PRN
Qty: 30 TABLET | Refills: 1 | Status: SHIPPED | OUTPATIENT
Start: 2022-08-15 | End: 2022-11-02 | Stop reason: SDUPTHER

## 2022-08-15 NOTE — PROGRESS NOTES
Subjective:       Patient ID: Martha Mcfarlane is a 67 y.o. female.   Chief Complaint: Leg Pain (Right---poss sciatica), Medication Refill, and Urinary Frequency    Patient complains right low pain radiating down to the lower near the ankle.  No trauma but this started after prolonged episode of driving.  She said there is no redness warmth or tenderness to direct touch.  She used cruise control for a good bit of the driving got outstretched but she thinks the CT was not and she developed what feels like sciatica that she has had in the.  She tried some heat and stretching and anti-inflammatories.  She had an old muscle relaxer that did not much.  She would like to do physical therapy because she is about to go on a cruise to [place].  She would like refill of Ambien for the trip  She would like a COVID  No loss of bowel or bladder function related to the pain but she has had some slow worsening urinary incontinence for months.  She denies burning frequency.  She does not think she has a UTI    Back Pain  This is a new problem. The current episode started 1 to 4 weeks ago. The problem occurs constantly. The problem has been waxing and waning since onset. The pain is present in the gluteal. The quality of the pain is described as aching and shooting. The pain is at a severity of 7/10. The pain is mild. The pain is worse during the day. The symptoms are aggravated by bending and sitting. Stiffness is present in the morning. Associated symptoms include bladder incontinence, leg pain, paresthesias and tingling. Pertinent negatives include no fever. Risk factors include history of osteoporosis, menopause, poor posture and history of renal stones. The treatment provided mild relief.     Review of Systems   Constitutional: Negative for fever.   Genitourinary: Positive for bladder incontinence.   Musculoskeletal: Positive for back pain and leg pain.   Integumentary:  Negative for rash.   Neurological: Positive for tingling and  paresthesias.   Psychiatric/Behavioral: Positive for sleep disturbance.          Objective:      Physical Exam  Constitutional:       General: She is not in acute distress.     Appearance: She is well-developed.   HENT:      Head: Normocephalic and atraumatic.      Right Ear: External ear normal.      Left Ear: Ear canal and external ear normal.      Mouth/Throat:      Pharynx: No oropharyngeal exudate or posterior oropharyngeal erythema.   Eyes:      General: No scleral icterus.     Conjunctiva/sclera: Conjunctivae normal.      Pupils: Pupils are equal, round, and reactive to light.   Neck:      Thyroid: No thyromegaly.   Cardiovascular:      Rate and Rhythm: Normal rate and regular rhythm.      Pulses: Normal pulses.      Heart sounds: No murmur heard.  Pulmonary:      Effort: Pulmonary effort is normal.      Breath sounds: Normal breath sounds. No wheezing.   Abdominal:      General: Bowel sounds are normal. There is no distension.      Palpations: Abdomen is soft.      Tenderness: There is no abdominal tenderness.   Musculoskeletal:         General: No tenderness.      Cervical back: Normal range of motion and neck supple.      Right lower leg: No edema.      Left lower leg: No edema.      Comments: No redness warmth cords noted.  No direct tenderness to palpation over the upper or lower portion of the leg.  Mildly positive straight leg raise on the right.  The patella tendon reflex seems to be 1+ compared to 2+ on the left.   Lymphadenopathy:      Cervical: No cervical adenopathy.   Skin:     Capillary Refill: Capillary refill takes less than 2 seconds.      Coloration: Skin is not jaundiced.      Findings: No rash.   Neurological:      General: No focal deficit present.      Mental Status: She is alert and oriented to person, place, and time.      Sensory: No sensory deficit.      Motor: No weakness.      Gait: Gait abnormal (Slight asymmetry gait favoring the right leg but no actual foot).      Deep Tendon  "Reflexes: Reflexes abnormal.   Psychiatric:         Mood and Affect: Mood normal.         Behavior: Behavior normal.         Assessment:       Problem List Items Addressed This Visit        Other    Insomnia    Relevant Medications    zolpidem (AMBIEN) 5 MG Tab      Other Visit Diagnoses     Right leg pain    -  Primary    Relevant Orders    Ambulatory referral/consult to Physical/Occupational Therapy          Plan:       Martha was seen today for leg pain, medication refill and urinary frequency.    Diagnoses and all orders for this visit:    Right leg pain  -     Ambulatory referral/consult to Physical/Occupational Therapy; Future    Insomnia, unspecified type  -     zolpidem (AMBIEN) 5 MG Tab; Take 1 tablet (5 mg total) by mouth nightly as needed (insomnia).         Patient says she was going to see 's urologist to discuss some urinary leakage.     reviewed.  She did not want a muscle relaxer  Trial of physical therapy  COVID booster    Update me if the leg acutely worsens or for any acute changes related to urine or stool.  Follow-up 4 weeks  Portions of this note may have been created with voice recognition software. Occasional "wrong-word" or "sound-a-like" substitutions may have occurred due to the inherent limitations of voice recognition software. Please, read the note carefully and recognize, using context, where substitutions have occurred.  "

## 2022-08-16 ENCOUNTER — PATIENT MESSAGE (OUTPATIENT)
Dept: INTERNAL MEDICINE | Facility: CLINIC | Age: 67
End: 2022-08-16
Payer: MEDICARE

## 2022-08-16 ENCOUNTER — TELEPHONE (OUTPATIENT)
Dept: INTERNAL MEDICINE | Facility: CLINIC | Age: 67
End: 2022-08-16
Payer: MEDICARE

## 2022-08-16 DIAGNOSIS — M79.604 RIGHT LEG PAIN: Primary | ICD-10-CM

## 2022-08-16 NOTE — TELEPHONE ENCOUNTER
----- Message from Anthony Patel sent at 8/16/2022 12:55 PM CDT -----  Contact: [facility] [phone]  [facility] is calling to get an order faxed over for physical therapy fax [phone] with [employer] info

## 2022-08-25 ENCOUNTER — TELEPHONE (OUTPATIENT)
Dept: INTERNAL MEDICINE | Facility: CLINIC | Age: 67
End: 2022-08-25
Payer: MEDICARE

## 2022-08-25 NOTE — TELEPHONE ENCOUNTER
----- Message from Zandra Coburn sent at 8/24/2022  9:52 AM CDT -----  Contact: Cydney   Cydney from [facility] would like the letter of medical neccesity refaxed to

## 2022-08-30 ENCOUNTER — PATIENT MESSAGE (OUTPATIENT)
Dept: INTERNAL MEDICINE | Facility: CLINIC | Age: 67
End: 2022-08-30
Payer: MEDICARE

## 2022-08-30 RX ORDER — GABAPENTIN 100 MG/1
100 CAPSULE ORAL 3 TIMES DAILY
Qty: 90 CAPSULE | Refills: 1 | Status: SHIPPED | OUTPATIENT
Start: 2022-08-30 | End: 2022-11-02

## 2022-09-01 ENCOUNTER — PATIENT MESSAGE (OUTPATIENT)
Dept: INTERNAL MEDICINE | Facility: CLINIC | Age: 67
End: 2022-09-01
Payer: MEDICARE

## 2022-09-01 ENCOUNTER — TELEPHONE (OUTPATIENT)
Dept: INTERNAL MEDICINE | Facility: CLINIC | Age: 67
End: 2022-09-01
Payer: MEDICARE

## 2022-09-01 RX ORDER — PREDNISONE 20 MG/1
TABLET ORAL
Qty: 12 TABLET | Refills: 0 | Status: SHIPPED | OUTPATIENT
Start: 2022-09-01 | End: 2022-11-02

## 2022-09-01 NOTE — TELEPHONE ENCOUNTER
----- Message from Jasmin Suarez sent at 9/1/2022  3:45 PM CDT -----  Contact: Ms. Lamas @ [facility] # [phone] fax# [phone]  Ms. Lamas from [facility] is calling in regards to her saying that she faxed over a letter of necessity for physical therapy for the patient on 08/25/2022 and 08/31/2022.  Ms. Lamas said that she would like for you to sign the letter and fax it back over to her please.       Birthweight: 3289 g (7 lb 4 oz)  Discharge weight: 3265 g (7 lb 3 2 oz)     Hepatitis B vaccination:    Hep B, Adolescent or Pediatric 2019       Mother's blood type: ABO Grouping   2019 O  Final     2019 Positive  Final     Baby's blood type:   2019 B  Final     2019 Positive  Final     Bilirubin:   Lab Units 01/21/19  0816   TOTAL BILIRUBIN mg/dL 2 01*       Hearing screen:   Initial Hearing Screen Results Left Ear: Pass  Initial Hearing Screen Results Right Ear: Pass  Hearing Screen Date: 01/21/19    CCHD screen: Pulse Ox Screen: Initial  CCHD Negative Screen: Pass - No Further Intervention Needed    Circumcision done 1/22/19

## 2022-09-02 ENCOUNTER — TELEPHONE (OUTPATIENT)
Dept: INTERNAL MEDICINE | Facility: CLINIC | Age: 67
End: 2022-09-02

## 2022-09-02 NOTE — TELEPHONE ENCOUNTER
----- Message from Jasmin Suarez sent at 9/1/2022  3:45 PM CDT -----  Contact: Ms. Lamas @ [facility] # [phone] fax# [phone]  Ms. Lamas from [facility] is calling in regards to her saying that she faxed over a letter of necessity for physical therapy for the patient on 08/25/2022 and 08/31/2022.  Ms. Lamas said that she would like for you to sign the letter and fax it back over to her please.

## 2022-11-02 ENCOUNTER — LAB VISIT (OUTPATIENT)
Dept: LAB | Facility: HOSPITAL | Age: 67
End: 2022-11-02
Attending: INTERNAL MEDICINE
Payer: MEDICARE

## 2022-11-02 ENCOUNTER — IMMUNIZATION (OUTPATIENT)
Dept: INTERNAL MEDICINE | Facility: CLINIC | Age: 67
End: 2022-11-02
Payer: MEDICARE

## 2022-11-02 ENCOUNTER — OFFICE VISIT (OUTPATIENT)
Dept: INTERNAL MEDICINE | Facility: CLINIC | Age: 67
End: 2022-11-02
Payer: MEDICARE

## 2022-11-02 ENCOUNTER — PATIENT MESSAGE (OUTPATIENT)
Dept: INTERNAL MEDICINE | Facility: CLINIC | Age: 67
End: 2022-11-02

## 2022-11-02 VITALS
DIASTOLIC BLOOD PRESSURE: 68 MMHG | HEART RATE: 68 BPM | OXYGEN SATURATION: 98 % | HEIGHT: 72 IN | WEIGHT: 138.88 LBS | BODY MASS INDEX: 18.81 KG/M2 | SYSTOLIC BLOOD PRESSURE: 124 MMHG

## 2022-11-02 DIAGNOSIS — G47.00 INSOMNIA, UNSPECIFIED TYPE: Primary | ICD-10-CM

## 2022-11-02 DIAGNOSIS — M54.32 BILATERAL SCIATICA: ICD-10-CM

## 2022-11-02 DIAGNOSIS — R73.09 ELEVATED GLUCOSE LEVEL: ICD-10-CM

## 2022-11-02 DIAGNOSIS — Z85.820 HISTORY OF MELANOMA: ICD-10-CM

## 2022-11-02 DIAGNOSIS — E78.5 DYSLIPIDEMIA: ICD-10-CM

## 2022-11-02 DIAGNOSIS — M54.31 BILATERAL SCIATICA: ICD-10-CM

## 2022-11-02 DIAGNOSIS — K59.00 CONSTIPATION, UNSPECIFIED CONSTIPATION TYPE: ICD-10-CM

## 2022-11-02 DIAGNOSIS — G47.00 INSOMNIA, UNSPECIFIED TYPE: ICD-10-CM

## 2022-11-02 LAB
ANION GAP SERPL CALC-SCNC: 10 MMOL/L (ref 8–16)
BASOPHILS # BLD AUTO: 0.03 K/UL (ref 0–0.2)
BASOPHILS NFR BLD: 1 % (ref 0–1.9)
BUN SERPL-MCNC: 17 MG/DL (ref 8–23)
CALCIUM SERPL-MCNC: 9.2 MG/DL (ref 8.7–10.5)
CHLORIDE SERPL-SCNC: 105 MMOL/L (ref 95–110)
CHOLEST SERPL-MCNC: 155 MG/DL (ref 120–199)
CHOLEST/HDLC SERPL: 3 {RATIO} (ref 2–5)
CO2 SERPL-SCNC: 27 MMOL/L (ref 23–29)
CREAT SERPL-MCNC: 0.7 MG/DL (ref 0.5–1.4)
DIFFERENTIAL METHOD: ABNORMAL
EOSINOPHIL # BLD AUTO: 0.1 K/UL (ref 0–0.5)
EOSINOPHIL NFR BLD: 1.6 % (ref 0–8)
ERYTHROCYTE [DISTWIDTH] IN BLOOD BY AUTOMATED COUNT: 12.9 % (ref 11.5–14.5)
EST. GFR  (NO RACE VARIABLE): >60 ML/MIN/1.73 M^2
ESTIMATED AVG GLUCOSE: 100 MG/DL (ref 68–131)
GLUCOSE SERPL-MCNC: 86 MG/DL (ref 70–110)
HBA1C MFR BLD: 5.1 % (ref 4–5.6)
HCT VFR BLD AUTO: 39.3 % (ref 37–48.5)
HDLC SERPL-MCNC: 51 MG/DL (ref 40–75)
HDLC SERPL: 32.9 % (ref 20–50)
HGB BLD-MCNC: 12.7 G/DL (ref 12–16)
IMM GRANULOCYTES # BLD AUTO: 0.01 K/UL (ref 0–0.04)
IMM GRANULOCYTES NFR BLD AUTO: 0.3 % (ref 0–0.5)
LDLC SERPL CALC-MCNC: 92.4 MG/DL (ref 63–159)
LYMPHOCYTES # BLD AUTO: 1.2 K/UL (ref 1–4.8)
LYMPHOCYTES NFR BLD: 36.5 % (ref 18–48)
MCH RBC QN AUTO: 29.3 PG (ref 27–31)
MCHC RBC AUTO-ENTMCNC: 32.3 G/DL (ref 32–36)
MCV RBC AUTO: 91 FL (ref 82–98)
MONOCYTES # BLD AUTO: 0.3 K/UL (ref 0.3–1)
MONOCYTES NFR BLD: 10.2 % (ref 4–15)
NEUTROPHILS # BLD AUTO: 1.6 K/UL (ref 1.8–7.7)
NEUTROPHILS NFR BLD: 50.4 % (ref 38–73)
NONHDLC SERPL-MCNC: 104 MG/DL
NRBC BLD-RTO: 0 /100 WBC
PLATELET # BLD AUTO: 188 K/UL (ref 150–450)
PMV BLD AUTO: 10.8 FL (ref 9.2–12.9)
POTASSIUM SERPL-SCNC: 4.2 MMOL/L (ref 3.5–5.1)
RBC # BLD AUTO: 4.33 M/UL (ref 4–5.4)
SODIUM SERPL-SCNC: 142 MMOL/L (ref 136–145)
TRIGL SERPL-MCNC: 58 MG/DL (ref 30–150)
TSH SERPL DL<=0.005 MIU/L-ACNC: 1.12 UIU/ML (ref 0.4–4)
WBC # BLD AUTO: 3.15 K/UL (ref 3.9–12.7)

## 2022-11-02 PROCEDURE — 80061 LIPID PANEL: CPT | Performed by: INTERNAL MEDICINE

## 2022-11-02 PROCEDURE — 83036 HEMOGLOBIN GLYCOSYLATED A1C: CPT | Performed by: INTERNAL MEDICINE

## 2022-11-02 PROCEDURE — 99213 OFFICE O/P EST LOW 20 MIN: CPT | Mod: PBBFAC,25 | Performed by: INTERNAL MEDICINE

## 2022-11-02 PROCEDURE — 85025 COMPLETE CBC W/AUTO DIFF WBC: CPT | Performed by: INTERNAL MEDICINE

## 2022-11-02 PROCEDURE — 99214 OFFICE O/P EST MOD 30 MIN: CPT | Mod: S$PBB,,, | Performed by: INTERNAL MEDICINE

## 2022-11-02 PROCEDURE — 80048 BASIC METABOLIC PNL TOTAL CA: CPT | Performed by: INTERNAL MEDICINE

## 2022-11-02 PROCEDURE — 99999 PR PBB SHADOW E&M-EST. PATIENT-LVL III: CPT | Mod: PBBFAC,,, | Performed by: INTERNAL MEDICINE

## 2022-11-02 PROCEDURE — 99999 PR PBB SHADOW E&M-EST. PATIENT-LVL III: ICD-10-PCS | Mod: PBBFAC,,, | Performed by: INTERNAL MEDICINE

## 2022-11-02 PROCEDURE — 99214 PR OFFICE/OUTPT VISIT, EST, LEVL IV, 30-39 MIN: ICD-10-PCS | Mod: S$PBB,,, | Performed by: INTERNAL MEDICINE

## 2022-11-02 PROCEDURE — G0008 ADMIN INFLUENZA VIRUS VAC: HCPCS | Mod: PBBFAC

## 2022-11-02 PROCEDURE — 36415 COLL VENOUS BLD VENIPUNCTURE: CPT | Performed by: INTERNAL MEDICINE

## 2022-11-02 PROCEDURE — 84443 ASSAY THYROID STIM HORMONE: CPT | Performed by: INTERNAL MEDICINE

## 2022-11-02 RX ORDER — ZOLPIDEM TARTRATE 5 MG/1
5 TABLET ORAL NIGHTLY PRN
Qty: 30 TABLET | Refills: 1 | Status: SHIPPED | OUTPATIENT
Start: 2022-11-02 | End: 2023-07-09 | Stop reason: SDUPTHER

## 2022-11-02 NOTE — PROGRESS NOTES
Subjective:       Patient ID: Martha Mcfarlane is a 67 y.o. female.    Chief Complaint: Annual Exam    Patient here for annual exam.  Successfully made her trip to [place], came back with only minimal sciatica and that has resolved.  Overall doing fairly well.  Still with some sleep issues but uses Ambien sparingly   review   She would like an updated prescription for Ambien and would like to get blood labs done.  She has chronic stable constipation, up-to-date with screening    Review of Systems   Constitutional:  Negative for activity change and unexpected weight change.   HENT:  Negative for hearing loss, rhinorrhea and trouble swallowing.    Eyes:  Negative for discharge and visual disturbance.   Respiratory:  Negative for chest tightness and wheezing.    Cardiovascular:  Negative for chest pain and palpitations.   Gastrointestinal:  Positive for constipation (chronic, stable). Negative for blood in stool, diarrhea and vomiting.   Endocrine: Negative for polydipsia and polyuria.   Genitourinary:  Negative for difficulty urinating, dysuria, hematuria and menstrual problem.   Musculoskeletal:  Negative for arthralgias, joint swelling and neck pain.   Neurological:  Negative for weakness and headaches.   Psychiatric/Behavioral:  Positive for sleep disturbance. Negative for confusion and dysphoric mood.          Past Medical History:   Diagnosis Date    Anemia     Constipation - functional     Encounter for blood transfusion     Kidney calculi     Melanoma     Mitral valve prolapse     Osteoporosis, unspecified     Stroke     tia     Past Surgical History:   Procedure Laterality Date    APPENDECTOMY      COLON SURGERY      COLONOSCOPY      COLONOSCOPY N/A 12/15/2015    Procedure: COLONOSCOPY;  Surgeon: ADALBERTO Liao MD;  Location: [address]);  Service: Endoscopy;  Laterality: N/A;    DE QUERVAIN'S RELEASE Right 10/6/2021    Procedure: RELEASE, HAND, FOR DEQUERVAIN'S TENOSYNOVITIS - right;  Surgeon: Aric  OSTERO Leonard MD;  Location: [facility] OR;  Service: Orthopedics;  Laterality: Right;    ESOPHAGOGASTRODUODENOSCOPY N/A 4/8/2020    Procedure: EGD (ESOPHAGOGASTRODUODENOSCOPY);  Surgeon: Jonathan Roth MD;  Location: [address]);  Service: Endoscopy;  Laterality: N/A;  check stat hct before     labs will be drawn prior-GT    HYSTERECTOMY      INTUSSUSCEPTION REPAIR      LASIK      LEFT COLECTOMY      melanoma on face removal      PARTIAL HYSTERECTOMY        Patient Active Problem List   Diagnosis    Osteoporosis    Hypercalciuria    Insomnia    Constipation    Symptomatic anemia    Melena    Right elbow pain    Tenosynovitis, de Quervain    History of COVID-19    History of melanoma        Objective:      Physical Exam  Constitutional:       General: She is not in acute distress.     Appearance: She is well-developed.   HENT:      Head: Normocephalic and atraumatic.      Right Ear: Tympanic membrane, ear canal and external ear normal.      Left Ear: Tympanic membrane, ear canal and external ear normal.      Mouth/Throat:      Pharynx: No oropharyngeal exudate or posterior oropharyngeal erythema.   Eyes:      General: No scleral icterus.     Conjunctiva/sclera: Conjunctivae normal.      Pupils: Pupils are equal, round, and reactive to light.   Neck:      Thyroid: No thyromegaly.   Cardiovascular:      Rate and Rhythm: Normal rate and regular rhythm.      Pulses: Normal pulses.      Heart sounds: No murmur heard.  Pulmonary:      Effort: Pulmonary effort is normal.      Breath sounds: Normal breath sounds. No wheezing.   Abdominal:      General: Bowel sounds are normal. There is no distension.      Palpations: Abdomen is soft.      Tenderness: There is no abdominal tenderness.   Musculoskeletal:         General: No tenderness.      Cervical back: Normal range of motion and neck supple.      Right lower leg: No edema.      Left lower leg: No edema.   Lymphadenopathy:      Cervical: No cervical adenopathy.   Skin:      Coloration: Skin is not jaundiced.      Findings: No rash.   Neurological:      General: No focal deficit present.      Mental Status: She is alert and oriented to person, place, and time.   Psychiatric:         Mood and Affect: Mood normal.         Behavior: Behavior normal.       Assessment:       Problem List Items Addressed This Visit          Oncology    History of melanoma       GI    Constipation    Relevant Orders    CBC Auto Differential    Basic Metabolic Panel    Lipid Panel    TSH    Hemoglobin A1C       Other    Insomnia - Primary    Relevant Medications    zolpidem (AMBIEN) 5 MG Tab    Other Relevant Orders    CBC Auto Differential    Basic Metabolic Panel    Lipid Panel    TSH    Hemoglobin A1C     Other Visit Diagnoses       Bilateral sciatica        Relevant Orders    CBC Auto Differential    Basic Metabolic Panel    Lipid Panel    TSH    Hemoglobin A1C    Elevated glucose level        Relevant Orders    CBC Auto Differential    Basic Metabolic Panel    Lipid Panel    TSH    Hemoglobin A1C    Dyslipidemia        Relevant Orders    Basic Metabolic Panel    Lipid Panel              Plan:         Martha was seen today for annual exam.    Diagnoses and all orders for this visit:    Insomnia, unspecified type  -     zolpidem (AMBIEN) 5 MG Tab; Take 1 tablet (5 mg total) by mouth nightly as needed (insomnia).  -     CBC Auto Differential; Future  -     Basic Metabolic Panel; Future  -     Lipid Panel; Future  -     TSH; Future  -     Hemoglobin A1C; Future    Constipation, unspecified constipation type  -     CBC Auto Differential; Future  -     Basic Metabolic Panel; Future  -     Lipid Panel; Future  -     TSH; Future  -     Hemoglobin A1C; Future    Bilateral sciatica  -     CBC Auto Differential; Future  -     Basic Metabolic Panel; Future  -     Lipid Panel; Future  -     TSH; Future  -     Hemoglobin A1C; Future    Elevated glucose level  -     CBC Auto Differential; Future  -     Basic Metabolic  "Panel; Future  -     Lipid Panel; Future  -     TSH; Future  -     Hemoglobin A1C; Future    Dyslipidemia  -     Basic Metabolic Panel; Future  -     Lipid Panel; Future    History of melanoma     Review studies, continue meds as she is currently doing.               Portions of this note may have been created with voice recognition software. Occasional "wrong-word" or "sound-a-like" substitutions may have occurred due to the inherent limitations of voice recognition software. Please, read the note carefully and recognize, using context, where substitutions have occurred.    "

## 2022-11-04 ENCOUNTER — PATIENT MESSAGE (OUTPATIENT)
Dept: INTERNAL MEDICINE | Facility: CLINIC | Age: 67
End: 2022-11-04
Payer: MEDICARE

## 2023-03-09 ENCOUNTER — TELEPHONE (OUTPATIENT)
Dept: FAMILY MEDICINE | Facility: CLINIC | Age: 68
End: 2023-03-09
Payer: MEDICARE

## 2023-04-05 ENCOUNTER — TELEPHONE (OUTPATIENT)
Dept: FAMILY MEDICINE | Facility: CLINIC | Age: 68
End: 2023-04-05
Payer: COMMERCIAL

## 2023-04-05 NOTE — TELEPHONE ENCOUNTER
----- Message from Nanci Velasco sent at 4/5/2023 10:12 AM CDT -----  Regarding: reschedule appoinment request  Name of Who is Calling:YA TORRES [[id number]          What is the request in detail: pt needs to reschedule her appointment please advise           Can the clinic reply by [facility]: no           [facility] [phone] (home)

## 2023-04-06 ENCOUNTER — TELEPHONE (OUTPATIENT)
Dept: FAMILY MEDICINE | Facility: CLINIC | Age: 68
End: 2023-04-06
Payer: COMMERCIAL

## 2023-04-06 NOTE — TELEPHONE ENCOUNTER
----- Message from Nanci Velasco sent at 4/5/2023 10:12 AM CDT -----  Regarding: reschedule appoinment request  Name of Who is Calling:YA TORRES [[id number]          What is the request in detail: pt needs to reschedule her appointment please advise           Can the clinic reply by [facility]: no           [facility] [phone] (home)

## 2023-04-10 ENCOUNTER — TELEPHONE (OUTPATIENT)
Dept: ADMINISTRATIVE | Facility: CLINIC | Age: 68
End: 2023-04-10
Payer: COMMERCIAL

## 2023-04-12 ENCOUNTER — TELEPHONE (OUTPATIENT)
Dept: FAMILY MEDICINE | Facility: CLINIC | Age: 68
End: 2023-04-12
Payer: COMMERCIAL

## 2023-04-12 DIAGNOSIS — Z78.0 MENOPAUSE: ICD-10-CM

## 2023-04-18 ENCOUNTER — TELEPHONE (OUTPATIENT)
Dept: ADMINISTRATIVE | Facility: CLINIC | Age: 68
End: 2023-04-18
Payer: COMMERCIAL

## 2023-04-20 ENCOUNTER — OFFICE VISIT (OUTPATIENT)
Dept: FAMILY MEDICINE | Facility: CLINIC | Age: 68
End: 2023-04-20
Payer: MEDICARE

## 2023-04-20 VITALS
OXYGEN SATURATION: 98 % | HEART RATE: 91 BPM | SYSTOLIC BLOOD PRESSURE: 118 MMHG | DIASTOLIC BLOOD PRESSURE: 72 MMHG | HEIGHT: 72 IN | WEIGHT: 141 LBS | BODY MASS INDEX: 19.1 KG/M2

## 2023-04-20 DIAGNOSIS — Z00.00 ENCOUNTER FOR PREVENTIVE HEALTH EXAMINATION: Primary | ICD-10-CM

## 2023-04-20 DIAGNOSIS — Z12.31 BREAST CANCER SCREENING BY MAMMOGRAM: ICD-10-CM

## 2023-04-20 DIAGNOSIS — G47.00 INSOMNIA, UNSPECIFIED TYPE: ICD-10-CM

## 2023-04-20 DIAGNOSIS — K59.00 CONSTIPATION, UNSPECIFIED CONSTIPATION TYPE: ICD-10-CM

## 2023-04-20 DIAGNOSIS — I70.0 AORTIC CALCIFICATION: ICD-10-CM

## 2023-04-20 DIAGNOSIS — M81.0 OSTEOPOROSIS WITHOUT CURRENT PATHOLOGICAL FRACTURE, UNSPECIFIED OSTEOPOROSIS TYPE: ICD-10-CM

## 2023-04-20 PROCEDURE — 99999 PR PBB SHADOW E&M-EST. PATIENT-LVL IV: CPT | Mod: PBBFAC,,,

## 2023-04-20 PROCEDURE — G0439 PR MEDICARE ANNUAL WELLNESS SUBSEQUENT VISIT: ICD-10-PCS | Mod: ,,,

## 2023-04-20 PROCEDURE — 99999 PR PBB SHADOW E&M-EST. PATIENT-LVL IV: ICD-10-PCS | Mod: PBBFAC,,,

## 2023-04-20 PROCEDURE — G0439 PPPS, SUBSEQ VISIT: HCPCS | Mod: ,,,

## 2023-04-20 PROCEDURE — 99214 OFFICE O/P EST MOD 30 MIN: CPT | Mod: PBBFAC,PN

## 2023-04-20 RX ORDER — POLYETHYLENE GLYCOL 3350 17 G/17G
17 POWDER, FOR SOLUTION ORAL DAILY
COMMUNITY

## 2023-04-20 NOTE — PATIENT INSTRUCTIONS
Counseling and Referral of Other Preventative  (Italic type indicates deductible and co-insurance are waived)    Patient Name: Martha Mcfarlane  Today's Date: 4/20/2023    Health Maintenance       Date Due Completion Date    Mammogram 03/03/2023 3/3/2022    Override on 3/1/2017: Done ([place])    Override on 6/5/2014: Done (Outside)    DEXA Scan 03/03/2023 3/3/2022    Override on 3/1/2017: Done ([place])    Override on 6/5/2014: Done (Outside)    Override on 5/1/2014: Done    COVID-19 Vaccine (5 - Booster for Moderna series) 06/30/2023 (Originally 10/10/2022) 8/15/2022    Pneumococcal Vaccines (Age 65+) (3 - PPSV23 if available, else PCV20) 01/25/2024 1/25/2019    Colorectal Cancer Screening 12/15/2025 12/15/2015    Lipid Panel 11/02/2027 11/2/2022    TETANUS VACCINE 01/25/2029 1/25/2019        Orders Placed This Encounter   Procedures    Mammo Digital Screening Bilat w/ [facility]     The following information is provided to all patients.  This information is to help you find resources for any of the problems found today that may be affecting your health:                Living healthy guide: www.[facility].[place].gov      Understanding Diabetes: www.diabetes.org      Eating healthy: www.cdc.gov/healthyweight      CDC home safety checklist: www.cdc.gov/steadi/patient.html      Agency on Aging: www.goea.[place].gov      Alcoholics anonymous (AA): www.aa.org      Physical Activity: www.yara.nih.gov/wm4nwub      Tobacco use: www.quitwithusla.org

## 2023-04-20 NOTE — PROGRESS NOTES
"Martha Mcfarlane presented for a  Medicare AWV and comprehensive Health Risk Assessment today. The following components were reviewed and updated:    Medical history  Family History  Social history  Allergies and Current Medications  Health Risk Assessment  Health Maintenance  Care Team         ** See Completed Assessments for Annual Wellness Visit within the encounter summary.**         The following assessments were completed:  Living Situation  CAGE  Depression Screening  Timed Get Up and Go  Whisper Test  Cognitive Function Screening      Nutrition Screening  ADL Screening  PAQ Screening        Vitals:    04/20/23 1548   BP: 118/72   Pulse: 91   SpO2: 98%   Weight: 64 kg (141 lb)   Height: 6' 1" (1.854 m)     Body mass index is 18.6 kg/m².  Physical Exam  Vitals reviewed.   Constitutional:       Appearance: Normal appearance. She is well-developed and well-groomed.   Cardiovascular:      Rate and Rhythm: Normal rate and regular rhythm.   Pulmonary:      Effort: Pulmonary effort is normal. No respiratory distress.      Breath sounds: No wheezing, rhonchi or rales.   Skin:     Coloration: Skin is not pale.   Neurological:      General: No focal deficit present.      Mental Status: She is alert and oriented to person, place, and time.   Psychiatric:         Behavior: Behavior normal. Behavior is cooperative.         Thought Content: Thought content normal.         Judgment: Judgment normal.             Diagnoses and health risks identified today and associated recommendations/orders:    1. Encounter for preventive health examination  2. Aortic calcification  Chronic. Stable. Followed by PCP.     3. Osteoporosis without current pathological fracture, unspecified osteoporosis type  Chronic; stable on medication. Followed by Endocrinology.     4. Insomnia, unspecified type  5. Constipation, unspecified constipation type  Chronic; stable on medication. Followed by PCP.     6. Breast cancer screening by mammogram  - " Mammo Digital Screening Bilat w/ [place]; Future      Review for Opioid Screening: Patient does not have rx for Opioids.    Review for Substance Use Disorders: Patient does not use substance.      Provided Martha with a 5-10 year written screening schedule and personal prevention plan. Recommendations were developed using the USPSTF age appropriate recommendations. Education, counseling, and referrals were provided as needed. After Visit Summary printed and given to patient which includes a list of additional screenings\tests needed.    Follow up in about 1 year (around 4/20/2024) for your next annual wellness visit.    Alisia Reyes, NP      Advance Care Planning     I offered to discuss advanced care planning, including how to pick a person who would make decisions for you if you were unable to make them for yourself, called a health care power of , and what kind of decisions you might make such as use of life sustaining treatments such as ventilators and tube feeding when faced with a life limiting illness recorded on a living will that they will need to know. (How you want to be cared for as you near the end of your natural life)     X Patient is interested in learning more about how to make advanced directives.  I provided them paperwork and offered to discuss this with them.

## 2023-04-21 PROBLEM — I70.0 AORTIC CALCIFICATION: Status: ACTIVE | Noted: 2023-04-21

## 2023-04-21 PROBLEM — Z86.16 HISTORY OF COVID-19: Status: RESOLVED | Noted: 2021-10-27 | Resolved: 2023-04-21

## 2023-05-15 ENCOUNTER — PATIENT MESSAGE (OUTPATIENT)
Dept: ENDOCRINOLOGY | Facility: CLINIC | Age: 68
End: 2023-05-15
Payer: COMMERCIAL

## 2023-05-17 DIAGNOSIS — M81.0 OSTEOPOROSIS WITHOUT CURRENT PATHOLOGICAL FRACTURE, UNSPECIFIED OSTEOPOROSIS TYPE: Primary | ICD-10-CM

## 2023-05-17 RX ORDER — SODIUM CHLORIDE 0.9 % (FLUSH) 0.9 %
10 SYRINGE (ML) INJECTION
Status: CANCELLED | OUTPATIENT
Start: 2023-05-17

## 2023-05-17 RX ORDER — ZOLEDRONIC ACID 5 MG/100ML
5 INJECTION, SOLUTION INTRAVENOUS
Status: CANCELLED | OUTPATIENT
Start: 2023-05-17

## 2023-07-06 ENCOUNTER — INFUSION (OUTPATIENT)
Dept: INFECTIOUS DISEASES | Facility: HOSPITAL | Age: 68
End: 2023-07-06
Payer: MEDICARE

## 2023-07-06 VITALS
HEART RATE: 70 BPM | WEIGHT: 142.31 LBS | HEIGHT: 72 IN | TEMPERATURE: 99 F | RESPIRATION RATE: 16 BRPM | DIASTOLIC BLOOD PRESSURE: 68 MMHG | OXYGEN SATURATION: 97 % | BODY MASS INDEX: 19.27 KG/M2 | SYSTOLIC BLOOD PRESSURE: 147 MMHG

## 2023-07-06 DIAGNOSIS — M81.0 OSTEOPOROSIS WITHOUT CURRENT PATHOLOGICAL FRACTURE, UNSPECIFIED OSTEOPOROSIS TYPE: ICD-10-CM

## 2023-07-06 DIAGNOSIS — R82.994 HYPERCALCIURIA: Primary | ICD-10-CM

## 2023-07-06 PROCEDURE — 63600175 PHARM REV CODE 636 W HCPCS: Performed by: INTERNAL MEDICINE

## 2023-07-06 PROCEDURE — 96365 THER/PROPH/DIAG IV INF INIT: CPT

## 2023-07-06 RX ORDER — SODIUM CHLORIDE 0.9 % (FLUSH) 0.9 %
10 SYRINGE (ML) INJECTION
Status: DISCONTINUED | OUTPATIENT
Start: 2023-07-06 | End: 2023-07-06 | Stop reason: HOSPADM

## 2023-07-06 RX ORDER — SODIUM CHLORIDE 0.9 % (FLUSH) 0.9 %
10 SYRINGE (ML) INJECTION
OUTPATIENT
Start: 2023-07-06

## 2023-07-06 RX ORDER — ZOLEDRONIC ACID 5 MG/100ML
5 INJECTION, SOLUTION INTRAVENOUS
OUTPATIENT
Start: 2023-07-06

## 2023-07-06 RX ORDER — ZOLEDRONIC ACID 5 MG/100ML
5 INJECTION, SOLUTION INTRAVENOUS
Status: COMPLETED | OUTPATIENT
Start: 2023-07-06 | End: 2023-07-06

## 2023-07-06 RX ADMIN — ZOLEDRONIC ACID 5 MG: 5 INJECTION, SOLUTION INTRAVENOUS at 01:07

## 2023-07-06 NOTE — PROGRESS NOTES
Arrived for Reclast infusion tolerated without any signs of adverse reactions left unit in NAD

## 2023-07-09 DIAGNOSIS — G47.00 INSOMNIA, UNSPECIFIED TYPE: ICD-10-CM

## 2023-07-10 RX ORDER — ZOLPIDEM TARTRATE 5 MG/1
5 TABLET ORAL NIGHTLY PRN
Qty: 30 TABLET | Refills: 1 | Status: SHIPPED | OUTPATIENT
Start: 2023-07-10 | End: 2023-07-24 | Stop reason: SDUPTHER

## 2023-07-10 NOTE — TELEPHONE ENCOUNTER
No care due was identified.  [facility] Embedded Care Due Messages. Reference number: [id number].   7/09/2023 9:11:49 PM CDT

## 2023-07-23 ENCOUNTER — PATIENT MESSAGE (OUTPATIENT)
Dept: INTERNAL MEDICINE | Facility: CLINIC | Age: 68
End: 2023-07-23
Payer: COMMERCIAL

## 2023-07-23 DIAGNOSIS — G47.00 INSOMNIA, UNSPECIFIED TYPE: ICD-10-CM

## 2023-07-24 NOTE — TELEPHONE ENCOUNTER
No care due was identified.  [facility] Embedded Care Due Messages. Reference number: [id number].   7/24/2023 2:44:57 PM CDT

## 2023-07-25 RX ORDER — ZOLPIDEM TARTRATE 5 MG/1
5 TABLET ORAL NIGHTLY PRN
Qty: 30 TABLET | Refills: 1 | Status: SHIPPED | OUTPATIENT
Start: 2023-07-25 | End: 2023-10-25 | Stop reason: SDUPTHER

## 2023-10-25 ENCOUNTER — LAB VISIT (OUTPATIENT)
Dept: LAB | Facility: HOSPITAL | Age: 68
End: 2023-10-25
Attending: INTERNAL MEDICINE
Payer: MEDICARE

## 2023-10-25 ENCOUNTER — OFFICE VISIT (OUTPATIENT)
Dept: INTERNAL MEDICINE | Facility: CLINIC | Age: 68
End: 2023-10-25
Payer: MEDICARE

## 2023-10-25 VITALS
BODY MASS INDEX: 19.48 KG/M2 | HEIGHT: 71 IN | HEART RATE: 60 BPM | SYSTOLIC BLOOD PRESSURE: 122 MMHG | DIASTOLIC BLOOD PRESSURE: 80 MMHG | WEIGHT: 139.13 LBS

## 2023-10-25 DIAGNOSIS — Z00.00 ROUTINE PHYSICAL EXAMINATION: ICD-10-CM

## 2023-10-25 DIAGNOSIS — Z71.84 TRAVEL ADVICE ENCOUNTER: ICD-10-CM

## 2023-10-25 DIAGNOSIS — G47.00 INSOMNIA, UNSPECIFIED TYPE: ICD-10-CM

## 2023-10-25 DIAGNOSIS — R73.09 ELEVATED GLUCOSE LEVEL: ICD-10-CM

## 2023-10-25 DIAGNOSIS — M54.32 BILATERAL SCIATICA: ICD-10-CM

## 2023-10-25 DIAGNOSIS — Z00.00 ROUTINE PHYSICAL EXAMINATION: Primary | ICD-10-CM

## 2023-10-25 DIAGNOSIS — E78.5 DYSLIPIDEMIA: ICD-10-CM

## 2023-10-25 DIAGNOSIS — M54.31 BILATERAL SCIATICA: ICD-10-CM

## 2023-10-25 DIAGNOSIS — R35.0 FREQUENCY OF MICTURITION: ICD-10-CM

## 2023-10-25 LAB
ANION GAP SERPL CALC-SCNC: 9 MMOL/L (ref 8–16)
BASOPHILS # BLD AUTO: 0.04 K/UL (ref 0–0.2)
BASOPHILS NFR BLD: 0.8 % (ref 0–1.9)
BUN SERPL-MCNC: 16 MG/DL (ref 8–23)
CALCIUM SERPL-MCNC: 9.5 MG/DL (ref 8.7–10.5)
CHLORIDE SERPL-SCNC: 107 MMOL/L (ref 95–110)
CHOLEST SERPL-MCNC: 145 MG/DL (ref 120–199)
CHOLEST/HDLC SERPL: 2.9 {RATIO} (ref 2–5)
CO2 SERPL-SCNC: 27 MMOL/L (ref 23–29)
CREAT SERPL-MCNC: 0.7 MG/DL (ref 0.5–1.4)
DIFFERENTIAL METHOD: NORMAL
EOSINOPHIL # BLD AUTO: 0.2 K/UL (ref 0–0.5)
EOSINOPHIL NFR BLD: 3.3 % (ref 0–8)
ERYTHROCYTE [DISTWIDTH] IN BLOOD BY AUTOMATED COUNT: 13 % (ref 11.5–14.5)
EST. GFR  (NO RACE VARIABLE): >60 ML/MIN/1.73 M^2
ESTIMATED AVG GLUCOSE: 100 MG/DL (ref 68–131)
GLUCOSE SERPL-MCNC: 85 MG/DL (ref 70–110)
HBA1C MFR BLD: 5.1 % (ref 4–5.6)
HCT VFR BLD AUTO: 37.4 % (ref 37–48.5)
HDLC SERPL-MCNC: 50 MG/DL (ref 40–75)
HDLC SERPL: 34.5 % (ref 20–50)
HGB BLD-MCNC: 12.2 G/DL (ref 12–16)
IMM GRANULOCYTES # BLD AUTO: 0.01 K/UL (ref 0–0.04)
IMM GRANULOCYTES NFR BLD AUTO: 0.2 % (ref 0–0.5)
LDLC SERPL CALC-MCNC: 85 MG/DL (ref 63–159)
LYMPHOCYTES # BLD AUTO: 1.4 K/UL (ref 1–4.8)
LYMPHOCYTES NFR BLD: 29.1 % (ref 18–48)
MCH RBC QN AUTO: 29.6 PG (ref 27–31)
MCHC RBC AUTO-ENTMCNC: 32.6 G/DL (ref 32–36)
MCV RBC AUTO: 91 FL (ref 82–98)
MONOCYTES # BLD AUTO: 0.4 K/UL (ref 0.3–1)
MONOCYTES NFR BLD: 8.1 % (ref 4–15)
NEUTROPHILS # BLD AUTO: 2.8 K/UL (ref 1.8–7.7)
NEUTROPHILS NFR BLD: 58.5 % (ref 38–73)
NONHDLC SERPL-MCNC: 95 MG/DL
NRBC BLD-RTO: 0 /100 WBC
PLATELET # BLD AUTO: 167 K/UL (ref 150–450)
PMV BLD AUTO: 11 FL (ref 9.2–12.9)
POTASSIUM SERPL-SCNC: 4.1 MMOL/L (ref 3.5–5.1)
RBC # BLD AUTO: 4.12 M/UL (ref 4–5.4)
SODIUM SERPL-SCNC: 143 MMOL/L (ref 136–145)
TRIGL SERPL-MCNC: 50 MG/DL (ref 30–150)
TSH SERPL DL<=0.005 MIU/L-ACNC: 0.97 UIU/ML (ref 0.4–4)
WBC # BLD AUTO: 4.81 K/UL (ref 3.9–12.7)

## 2023-10-25 PROCEDURE — 80048 BASIC METABOLIC PNL TOTAL CA: CPT | Performed by: INTERNAL MEDICINE

## 2023-10-25 PROCEDURE — 99214 OFFICE O/P EST MOD 30 MIN: CPT | Mod: S$PBB,,, | Performed by: INTERNAL MEDICINE

## 2023-10-25 PROCEDURE — 83036 HEMOGLOBIN GLYCOSYLATED A1C: CPT | Performed by: INTERNAL MEDICINE

## 2023-10-25 PROCEDURE — 80061 LIPID PANEL: CPT | Performed by: INTERNAL MEDICINE

## 2023-10-25 PROCEDURE — 99999 PR PBB SHADOW E&M-EST. PATIENT-LVL III: ICD-10-PCS | Mod: PBBFAC,,, | Performed by: INTERNAL MEDICINE

## 2023-10-25 PROCEDURE — 99999 PR PBB SHADOW E&M-EST. PATIENT-LVL III: CPT | Mod: PBBFAC,,, | Performed by: INTERNAL MEDICINE

## 2023-10-25 PROCEDURE — 36415 COLL VENOUS BLD VENIPUNCTURE: CPT | Performed by: INTERNAL MEDICINE

## 2023-10-25 PROCEDURE — 99213 OFFICE O/P EST LOW 20 MIN: CPT | Mod: PBBFAC | Performed by: INTERNAL MEDICINE

## 2023-10-25 PROCEDURE — 84443 ASSAY THYROID STIM HORMONE: CPT | Performed by: INTERNAL MEDICINE

## 2023-10-25 PROCEDURE — 99214 PR OFFICE/OUTPT VISIT, EST, LEVL IV, 30-39 MIN: ICD-10-PCS | Mod: S$PBB,,, | Performed by: INTERNAL MEDICINE

## 2023-10-25 PROCEDURE — 85025 COMPLETE CBC W/AUTO DIFF WBC: CPT | Performed by: INTERNAL MEDICINE

## 2023-10-25 RX ORDER — ZOLPIDEM TARTRATE 5 MG/1
5 TABLET ORAL NIGHTLY PRN
Qty: 30 TABLET | Refills: 1 | Status: SHIPPED | OUTPATIENT
Start: 2023-10-25 | End: 2024-04-24

## 2023-10-25 RX ORDER — CYCLOBENZAPRINE HCL 10 MG
10 TABLET ORAL 3 TIMES DAILY PRN
Qty: 30 TABLET | Refills: 0 | Status: SHIPPED | OUTPATIENT
Start: 2023-10-25 | End: 2023-11-24

## 2023-10-25 NOTE — PROGRESS NOTES
Subjective:       Patient ID: Martha Mcfarlane is a 68 y.o. female.    Chief Complaint: Annual Exam    Patient here for annual exam and follow-up medical problems including she also would like some medication for travel.  She is taking a trip to [place].  She would like a prescription for Ambien and muscle relaxer.  Flare-up of her sciatica and would like a muscle relaxer.  We will give her Flexeril.  It is not nearly as bad as last year but she is worried going on trip that the flight and other travel accommodations may cause it to flare up.    She denies any chest pain or shortness of breath.  No cough or wheeze.  No GI or  complaints.  We will update labs and urine.  She has had mildly increased urine lately.  No burning but she would like a urine check.      Review of Systems   Constitutional:  Negative for appetite change, chills and fever.   HENT:  Negative for nosebleeds and sore throat.    Eyes:  Negative for pain and visual disturbance.   Respiratory:  Negative for cough, shortness of breath and wheezing.    Cardiovascular:  Negative for chest pain and leg swelling.   Gastrointestinal:  Negative for abdominal pain, constipation and diarrhea.   Endocrine: Negative for polyuria.   Genitourinary:  Positive for frequency (More chronic than acute). Negative for difficulty urinating, hematuria and vaginal bleeding.   Musculoskeletal:  Positive for back pain. Negative for arthralgias, gait problem and neck pain.   Integumentary:  Negative for pallor and rash.   Neurological:  Negative for tremors, seizures and headaches.   Hematological:  Does not bruise/bleed easily.   Psychiatric/Behavioral:  Negative for dysphoric mood. The patient is not nervous/anxious.            Past Medical History:   Diagnosis Date    Anemia     Constipation - functional     Encounter for blood transfusion     History of COVID-19 10/27/2021    Kidney calculi     Melanoma     Mitral valve prolapse     Osteoporosis, unspecified     Stroke      tia     Past Surgical History:   Procedure Laterality Date    APPENDECTOMY      COLON SURGERY      COLONOSCOPY      COLONOSCOPY N/A 12/15/2015    Procedure: COLONOSCOPY;  Surgeon: ADALBERTO Liao MD;  [facility]  Service: Endoscopy;  Laterality: N/A;    DE QUERVAIN'S RELEASE Right 10/6/2021    Procedure: RELEASE, HAND, FOR DEQUERVAIN'S TENOSYNOVITIS - right;  Surgeon: Aric Leonard MD;  Location: [facility] OR;  Service: Orthopedics;  Laterality: Right;    ESOPHAGOGASTRODUODENOSCOPY N/A 4/8/2020    Procedure: EGD (ESOPHAGOGASTRODUODENOSCOPY);  Surgeon: Jonathan Roth MD;  Location: [facility] (2ND FLR);  Service: Endoscopy;  Laterality: N/A;  check stat hct before     labs will be drawn prior-GT    HYSTERECTOMY      INTUSSUSCEPTION REPAIR      LASIK      LEFT COLECTOMY      melanoma on face removal      PARTIAL HYSTERECTOMY        Patient Active Problem List   Diagnosis    Osteoporosis    Hypercalciuria    Insomnia    Constipation    Symptomatic anemia    Melena    Right elbow pain    Tenosynovitis, de Quervain    History of melanoma    Aortic calcification        Objective:      Physical Exam  Constitutional:       General: She is not in acute distress.     Appearance: She is well-developed.   HENT:      Head: Normocephalic and atraumatic.      Right Ear: Tympanic membrane, ear canal and external ear normal.      Left Ear: Tympanic membrane, ear canal and external ear normal.      Mouth/Throat:      Pharynx: No oropharyngeal exudate or posterior oropharyngeal erythema.   Eyes:      General: No scleral icterus.     Conjunctiva/sclera: Conjunctivae normal.      Pupils: Pupils are equal, round, and reactive to light.   Neck:      Thyroid: No thyromegaly.   Cardiovascular:      Rate and Rhythm: Normal rate and regular rhythm.      Pulses: Normal pulses.      Heart sounds: No murmur heard.  Pulmonary:      Effort: Pulmonary effort is normal.      Breath sounds: Normal breath sounds. No wheezing.    Abdominal:      General: Bowel sounds are normal. There is no distension.      Palpations: Abdomen is soft.      Tenderness: There is no abdominal tenderness.   Musculoskeletal:         General: Tenderness (mild low back soreness) present.      Cervical back: Normal range of motion and neck supple.      Right lower leg: No edema.      Left lower leg: No edema.   Lymphadenopathy:      Cervical: No cervical adenopathy.   Skin:     Coloration: Skin is not jaundiced.      Findings: No rash.   Neurological:      General: No focal deficit present.      Mental Status: She is alert and oriented to person, place, and time.   Psychiatric:         Mood and Affect: Mood normal.         Behavior: Behavior normal.         Assessment:       Problem List Items Addressed This Visit          Other    Insomnia    Relevant Medications    zolpidem (AMBIEN) 5 MG Tab    Other Relevant Orders    Lipid Panel    TSH    Hemoglobin A1C    CBC Auto Differential    Basic Metabolic Panel     Other Visit Diagnoses       Routine physical examination    -  Primary    Relevant Orders    Lipid Panel    TSH    Hemoglobin A1C    CBC Auto Differential    Basic Metabolic Panel    Bilateral sciatica        Relevant Medications    cyclobenzaprine (FLEXERIL) 10 MG tablet    Other Relevant Orders    Lipid Panel    TSH    Hemoglobin A1C    CBC Auto Differential    Basic Metabolic Panel    Travel advice encounter        Relevant Medications    zolpidem (AMBIEN) 5 MG Tab    Frequency of micturition        Relevant Orders    Urinalysis, Reflex to Urine Culture    Elevated glucose level        Relevant Orders    Hemoglobin A1C    Dyslipidemia        Relevant Orders    Lipid Panel            Plan:         Martha was seen today for annual exam.    Diagnoses and all orders for this visit:    Routine physical examination  -     Lipid Panel; Future  -     TSH; Future  -     Hemoglobin A1C; Future  -     CBC Auto Differential; Future  -     Basic Metabolic Panel;  "Future    Bilateral sciatica  -     cyclobenzaprine (FLEXERIL) 10 MG tablet; Take 1 tablet (10 mg total) by mouth 3 (three) times daily as needed for Muscle spasms.  -     Lipid Panel; Future  -     TSH; Future  -     Hemoglobin A1C; Future  -     CBC Auto Differential; Future  -     Basic Metabolic Panel; Future    Insomnia, unspecified type  -     zolpidem (AMBIEN) 5 MG Tab; Take 1 tablet (5 mg total) by mouth nightly as needed (insomnia).  -     Lipid Panel; Future  -     TSH; Future  -     Hemoglobin A1C; Future  -     CBC Auto Differential; Future  -     Basic Metabolic Panel; Future    Travel advice encounter  -     zolpidem (AMBIEN) 5 MG Tab; Take 1 tablet (5 mg total) by mouth nightly as needed (insomnia).    Frequency of micturition  -     Urinalysis, Reflex to Urine Culture; Future    Elevated glucose level  -     Hemoglobin A1C; Future    Dyslipidemia  -     Lipid Panel; Future       COVID booster.  She has already had her flu shot      Review studies and follow-up in a few months and annually        Portions of this note may have been created with voice recognition software. Occasional "wrong-word" or "sound-a-like" substitutions may have occurred due to the inherent limitations of voice recognition software. Please, read the note carefully and recognize, using context, where substitutions have occurred.  "

## 2023-11-02 ENCOUNTER — PATIENT MESSAGE (OUTPATIENT)
Dept: INTERNAL MEDICINE | Facility: CLINIC | Age: 68
End: 2023-11-02
Payer: COMMERCIAL

## 2023-11-02 DIAGNOSIS — R35.0 FREQUENCY OF MICTURITION: Primary | ICD-10-CM

## 2023-11-02 NOTE — TELEPHONE ENCOUNTER
Can we check with the microbiology lab and see when the urine culture will be finalize?  It still says in process and I believe that was from 4 or 5 days ago.

## 2023-11-03 NOTE — TELEPHONE ENCOUNTER
Thank you for the update.  Please apologize to the culture was not run even though the basic urinalysis was done.  If possible can he provide another that we can run for culture.  I signed the new order.

## 2023-11-03 NOTE — TELEPHONE ENCOUNTER
"Called [facility]; was answered    [facility] states that "the sample was never received" for Urine Culture     Asked [facility] about this  [facility] says that a new specimen should be collected for culture; culture and urinalysis should have been run at the same time, but they were unsure why it wasn't     Pended new urine culture order  "

## 2023-11-06 ENCOUNTER — PATIENT MESSAGE (OUTPATIENT)
Dept: INTERNAL MEDICINE | Facility: CLINIC | Age: 68
End: 2023-11-06
Payer: COMMERCIAL

## 2023-11-06 RX ORDER — AMOXICILLIN AND CLAVULANATE POTASSIUM 875; 125 MG/1; MG/1
1 TABLET, FILM COATED ORAL 2 TIMES DAILY
Qty: 14 TABLET | Refills: 0 | Status: SHIPPED | OUTPATIENT
Start: 2023-11-06 | End: 2023-11-13

## 2023-11-06 NOTE — TELEPHONE ENCOUNTER
Can someone help me complete an SOS report for the lab not running the urine Culture. It was delayed several days and we had to repeat the sample. I got the results this AM and wanted to start antibiotics but the patient boarded a flight out of the country today for a week. I will see if I can reach her through the portal to treat in [place]. I am hoping she can get the needed antibiotic there.

## 2023-11-07 ENCOUNTER — TELEPHONE (OUTPATIENT)
Dept: INTERNAL MEDICINE | Facility: CLINIC | Age: 68
End: 2023-11-07

## 2023-11-07 NOTE — TELEPHONE ENCOUNTER
I spoke to the patients wife. He was aware she was waiting on a specimen result. He also knew that she had ecoli. I gave him the name of the antibiotic that was ordered and the directions. I asked him if he can get in contact with her or will she call him and to please let her know. He has my direct line to keep me updated if he speaks to her. Thanks Dr. Mcneill.

## 2023-11-16 ENCOUNTER — TELEPHONE (OUTPATIENT)
Dept: INTERNAL MEDICINE | Facility: CLINIC | Age: 68
End: 2023-11-16
Payer: COMMERCIAL

## 2023-11-16 NOTE — TELEPHONE ENCOUNTER
The patient is back in the [place] she did well on the trip she started the antibiotics on yesterday. She came back with fever, 60 people on the trip to [facility] had COvid, their family has been alerted. They tested her for Covid last night and she is negative so far. She did take her vaccine 2 weeks ago prior to her leaving. The  stated thank you for following up.

## 2023-12-11 ENCOUNTER — PATIENT MESSAGE (OUTPATIENT)
Dept: INTERNAL MEDICINE | Facility: CLINIC | Age: 68
End: 2023-12-11
Payer: COMMERCIAL

## 2023-12-11 DIAGNOSIS — N39.0 URINARY TRACT INFECTION WITHOUT HEMATURIA, SITE UNSPECIFIED: ICD-10-CM

## 2023-12-11 DIAGNOSIS — R32 URINARY INCONTINENCE, UNSPECIFIED TYPE: Primary | ICD-10-CM

## 2023-12-11 NOTE — TELEPHONE ENCOUNTER
Pt reports urgency and incontinence of urine when transitioning from seated to walking    Pended urology referral

## 2023-12-17 ENCOUNTER — PATIENT MESSAGE (OUTPATIENT)
Dept: INTERNAL MEDICINE | Facility: CLINIC | Age: 68
End: 2023-12-17
Payer: COMMERCIAL

## 2023-12-17 RX ORDER — AMOXICILLIN AND CLAVULANATE POTASSIUM 875; 125 MG/1; MG/1
1 TABLET, FILM COATED ORAL 2 TIMES DAILY
Qty: 20 TABLET | Refills: 0 | Status: SHIPPED | OUTPATIENT
Start: 2023-12-17 | End: 2023-12-27

## 2023-12-28 ENCOUNTER — PATIENT MESSAGE (OUTPATIENT)
Dept: INTERNAL MEDICINE | Facility: CLINIC | Age: 68
End: 2023-12-28
Payer: COMMERCIAL

## 2024-01-03 ENCOUNTER — OFFICE VISIT (OUTPATIENT)
Dept: UROLOGY | Facility: CLINIC | Age: 69
End: 2024-01-03
Payer: MEDICARE

## 2024-01-03 VITALS
HEART RATE: 64 BPM | HEIGHT: 71 IN | BODY MASS INDEX: 19.78 KG/M2 | DIASTOLIC BLOOD PRESSURE: 81 MMHG | SYSTOLIC BLOOD PRESSURE: 153 MMHG | WEIGHT: 141.31 LBS

## 2024-01-03 DIAGNOSIS — Z01.818 PRE-OP TESTING: ICD-10-CM

## 2024-01-03 DIAGNOSIS — Z87.442 HISTORY OF NEPHROLITHIASIS: ICD-10-CM

## 2024-01-03 DIAGNOSIS — N81.10 BADEN-WALKER GRADE 1 CYSTOCELE: ICD-10-CM

## 2024-01-03 DIAGNOSIS — N39.46 MIXED STRESS AND URGE URINARY INCONTINENCE: Primary | ICD-10-CM

## 2024-01-03 DIAGNOSIS — N89.8 VAGINAL IRRITATION: ICD-10-CM

## 2024-01-03 DIAGNOSIS — N39.0 RECURRENT UTI (URINARY TRACT INFECTION): ICD-10-CM

## 2024-01-03 LAB
BACTERIA #/AREA URNS AUTO: ABNORMAL /HPF
BILIRUB UR QL STRIP: NEGATIVE
CLARITY UR REFRACT.AUTO: CLEAR
COLOR UR AUTO: ABNORMAL
GLUCOSE UR QL STRIP: NEGATIVE
HGB UR QL STRIP: ABNORMAL
KETONES UR QL STRIP: NEGATIVE
LEUKOCYTE ESTERASE UR QL STRIP: ABNORMAL
MICROSCOPIC COMMENT: ABNORMAL
NITRITE UR QL STRIP: NEGATIVE
PH UR STRIP: 6 [PH] (ref 5–8)
POC RESIDUAL URINE VOLUME: 36 ML (ref 0–100)
PROT UR QL STRIP: NEGATIVE
RBC #/AREA URNS AUTO: 1 /HPF (ref 0–4)
SP GR UR STRIP: 1 (ref 1–1.03)
SQUAMOUS #/AREA URNS AUTO: 0 /HPF
URN SPEC COLLECT METH UR: ABNORMAL
WBC #/AREA URNS AUTO: 4 /HPF (ref 0–5)

## 2024-01-03 PROCEDURE — 87186 SC STD MICRODIL/AGAR DIL: CPT | Performed by: NURSE PRACTITIONER

## 2024-01-03 PROCEDURE — 99999PBSHW POCT BLADDER SCAN: Mod: PBBFAC,,,

## 2024-01-03 PROCEDURE — 99215 OFFICE O/P EST HI 40 MIN: CPT | Mod: PBBFAC,PO | Performed by: NURSE PRACTITIONER

## 2024-01-03 PROCEDURE — 87077 CULTURE AEROBIC IDENTIFY: CPT | Performed by: NURSE PRACTITIONER

## 2024-01-03 PROCEDURE — 51798 US URINE CAPACITY MEASURE: CPT | Mod: PBBFAC,PO | Performed by: NURSE PRACTITIONER

## 2024-01-03 PROCEDURE — 87086 URINE CULTURE/COLONY COUNT: CPT | Performed by: NURSE PRACTITIONER

## 2024-01-03 PROCEDURE — 99999 PR PBB SHADOW E&M-EST. PATIENT-LVL V: CPT | Mod: PBBFAC,,, | Performed by: NURSE PRACTITIONER

## 2024-01-03 PROCEDURE — 81001 URINALYSIS AUTO W/SCOPE: CPT | Performed by: NURSE PRACTITIONER

## 2024-01-03 PROCEDURE — 87088 URINE BACTERIA CULTURE: CPT | Performed by: NURSE PRACTITIONER

## 2024-01-03 PROCEDURE — 99214 OFFICE O/P EST MOD 30 MIN: CPT | Mod: S$PBB,,, | Performed by: NURSE PRACTITIONER

## 2024-01-03 RX ORDER — NYSTATIN 100000 U/G
CREAM TOPICAL 2 TIMES DAILY
Qty: 30 G | Refills: 1 | Status: SHIPPED | OUTPATIENT
Start: 2024-01-03 | End: 2024-01-17

## 2024-01-03 RX ORDER — FLUCONAZOLE 100 MG/1
100 TABLET ORAL DAILY
Qty: 3 EACH | Refills: 0 | Status: SHIPPED | OUTPATIENT
Start: 2024-01-03 | End: 2024-01-03

## 2024-01-03 RX ORDER — FLUCONAZOLE 100 MG/1
100 TABLET ORAL DAILY
Qty: 5 TABLET | Refills: 0 | Status: SHIPPED | OUTPATIENT
Start: 2024-01-03 | End: 2024-01-04

## 2024-01-03 NOTE — PATIENT INSTRUCTIONS
Bladder scan (PVR) today  U/A and urine cx today  U/S retroperitoneal complete (next available).  Schedule patient for cysto with UDS for evaluation of mixed urinary incontinence, cystocele, and recurrent UTIs.   Pre-op labs (CBC, BMP) needed two weeks prior to surgery.  Surgery packet provided to patient.   Follow-up post-up.

## 2024-01-03 NOTE — PROGRESS NOTES
Subjective:       Patient ID: Martha Mcfarlane is a 68 y.o. female.    Chief Complaint: urinary inctontinence and Vaginitis (/)    Patient is new to me. She is a 69 yo WF who is here today with c/o vaginal irritation, recurrent UTIs, urinary incontinence with urgency, laughing, coughing, and sneezing. She also has c/o urinary frequency and nocturia. Surgical hx includes a bladder lift in 1999 when her uterus was removed per patient.      Urinary Tract Infection   This is a new problem. Episode onset: 3 months ago. The problem has been waxing and waning. The pain is at a severity of 0/10. The patient is experiencing no pain. There has been no fever. There is No history of pyelonephritis. Associated symptoms include frequency, urgency (with leakage) and constipation. Pertinent negatives include no behavior changes, chills, flank pain, hematuria, hesitancy, nausea, vomiting or bubble bath use. Her past medical history is significant for kidney stones and recurrent UTIs. There is no history of diabetes mellitus, hypertension, a single kidney or a urological procedure.   Other  This is a chronic (urinary incontinence) problem. Episode onset: 1-2 years ago. The problem occurs intermittently. The problem has been waxing and waning. Associated symptoms include urinary symptoms. Pertinent negatives include no abdominal pain, anorexia, chills, fatigue, fever, headaches, nausea, swollen glands, vomiting or weakness. Nothing aggravates the symptoms. She has tried nothing for the symptoms.     Review of Systems   Constitutional:  Negative for chills, fatigue and fever.   Gastrointestinal:  Positive for constipation. Negative for abdominal pain, anorexia, diarrhea, nausea and vomiting.   Genitourinary:  Positive for bladder incontinence (with laughing, coughing, sneezing, and urgency), frequency, nocturia (x2) and urgency (with leakage). Negative for decreased urine volume, difficulty urinating, dysuria, flank pain, hematuria and  hesitancy.        Vaginal itching.  Vaginal pressure.    Neurological:  Negative for dizziness, weakness and headaches.   Psychiatric/Behavioral: Negative.           Objective:      Physical Exam  Vitals and nursing note reviewed.   Constitutional:       General: She is not in acute distress.     Appearance: She is well-developed and normal weight. She is not ill-appearing.   HENT:      Head: Normocephalic and atraumatic.   Eyes:      Pupils: Pupils are equal, round, and reactive to light.   Cardiovascular:      Rate and Rhythm: Normal rate.   Pulmonary:      Effort: Pulmonary effort is normal. No respiratory distress.   Abdominal:      Palpations: Abdomen is soft.      Tenderness: There is no abdominal tenderness.   Genitourinary:         Comments: Vaginal irritation noted where graphically documented.  Musculoskeletal:         General: Normal range of motion.      Cervical back: Normal range of motion.   Skin:     General: Skin is warm and dry.   Neurological:      Mental Status: She is alert and oriented to person, place, and time.      Coordination: Coordination normal.   Psychiatric:         Mood and Affect: Mood normal.         Behavior: Behavior normal.         Thought Content: Thought content normal.         Judgment: Judgment normal.         Assessment:       Problem List Items Addressed This Visit    None  Visit Diagnoses       Mixed stress and urge urinary incontinence    -  Primary    Relevant Orders    POCT Bladder Scan (Completed)    Urine culture    US Retroperitoneal Complete    Urinalysis (Completed)    CBC Auto Differential    Basic Metabolic Panel    Recurrent UTI (urinary tract infection)        Relevant Orders    POCT Bladder Scan (Completed)    Urine culture    US Retroperitoneal Complete    Urinalysis (Completed)    CBC Auto Differential    Basic Metabolic Panel    History of nephrolithiasis        Relevant Orders    POCT Bladder Scan (Completed)    Urine culture    US Retroperitoneal Complete     Urinalysis (Completed)    CBC Auto Differential    Basic Metabolic Panel    Vaginal irritation        Relevant Medications    nystatin (MYCOSTATIN) cream    fluconazole (DIFLUCAN) 100 MG tablet    Other Relevant Orders    POCT Bladder Scan (Completed)    Urine culture    Urinalysis (Completed)    CBC Auto Differential    Basic Metabolic Panel    Blockton-Walker grade 1 cystocele        Relevant Orders    Urinalysis (Completed)    CBC Auto Differential    Basic Metabolic Panel    Pre-op testing        Relevant Orders    Urinalysis (Completed)    CBC Auto Differential    Basic Metabolic Panel            Plan:             Martha was seen today for urinary inctontinence and vaginitis.    Diagnoses and all orders for this visit:    Mixed stress and urge urinary incontinence  -     POCT Bladder Scan  -     Urine culture  -     US Retroperitoneal Complete; Future  -     Urinalysis  -     CBC Auto Differential; Future  -     Basic Metabolic Panel; Future  -     Case Request Operating Room: CYSTOSCOPY, WITH URODYNAMIC TESTING  -     Vital Signs ; Standing  -     Notify physician ; Standing  -     Diet NPO; Standing  -     Place in Outpatient; Standing  -     Place BRIGHT hose; Standing  -     Diet NPO    Recurrent UTI (urinary tract infection)  -     POCT Bladder Scan  -     Urine culture  -     US Retroperitoneal Complete; Future  -     Urinalysis  -     CBC Auto Differential; Future  -     Basic Metabolic Panel; Future  -     Case Request Operating Room: CYSTOSCOPY, WITH URODYNAMIC TESTING  -     Vital Signs ; Standing  -     Notify physician ; Standing  -     Diet NPO; Standing  -     Place in Outpatient; Standing  -     Place BRIGHT hose; Standing  -     Diet NPO    History of nephrolithiasis  -     POCT Bladder Scan  -     Urine culture  -     US Retroperitoneal Complete; Future  -     Urinalysis  -     CBC Auto Differential; Future  -     Basic Metabolic Panel; Future    Vaginal irritation  -     POCT Bladder Scan  -      Urine culture  -     fluconazole (DIFLUCAN) 100 MG tablet; Take 1 tablet (100 mg total) by mouth once daily. for 3 days  -     nystatin (MYCOSTATIN) cream; Apply topically 2 (two) times daily. for 7 days  -     fluconazole (DIFLUCAN) 100 MG tablet; Take 1 tablet (100 mg total) by mouth once daily. for 5 days  -     Urinalysis  -     CBC Auto Differential; Future  -     Basic Metabolic Panel; Future    Deshler-Walker grade 1 cystocele  -     Urinalysis  -     CBC Auto Differential; Future  -     Basic Metabolic Panel; Future  -     Case Request Operating Room: CYSTOSCOPY, WITH URODYNAMIC TESTING  -     Vital Signs ; Standing  -     Notify physician ; Standing  -     Diet NPO; Standing  -     Place in Outpatient; Standing  -     Place BRIGHT hose; Standing  -     Diet NPO    Pre-op testing  -     Urinalysis  -     CBC Auto Differential; Future  -     Basic Metabolic Panel; Future    Other orders  -     Urinalysis Microscopic  -     0.9%  NaCl infusion  -     IP VTE LOW RISK PATIENT; Standing  -     ciprofloxacin (CIPRO)400mg/200ml D5W IVPB 400 mg  -     LIDOcaine HCl 2% urojet    Schedule patient for cysto with UDS for evaluation of mixed urinary incontinence, cystocele, and recurrent UTIs.   Pre-op labs (CBC, BMP) needed two weeks prior to surgery.  Surgery packet provided to patient.     Follow-up post-up.     Estela Castillo NP

## 2024-01-04 RX ORDER — CIPROFLOXACIN 2 MG/ML
400 INJECTION, SOLUTION INTRAVENOUS
OUTPATIENT
Start: 2024-01-04

## 2024-01-04 RX ORDER — SODIUM CHLORIDE 9 MG/ML
INJECTION, SOLUTION INTRAVENOUS CONTINUOUS
OUTPATIENT
Start: 2024-01-04

## 2024-01-04 RX ORDER — LIDOCAINE HYDROCHLORIDE 20 MG/ML
JELLY TOPICAL ONCE
OUTPATIENT
Start: 2024-01-04 | End: 2024-01-04

## 2024-01-05 LAB — BACTERIA UR CULT: ABNORMAL

## 2024-01-11 ENCOUNTER — TELEPHONE (OUTPATIENT)
Dept: UROLOGY | Facility: CLINIC | Age: 69
End: 2024-01-11
Payer: COMMERCIAL

## 2024-01-11 DIAGNOSIS — N30.01 ACUTE CYSTITIS WITH HEMATURIA: Primary | ICD-10-CM

## 2024-01-11 RX ORDER — CIPROFLOXACIN 500 MG/1
500 TABLET ORAL EVERY 12 HOURS
Qty: 14 TABLET | Refills: 0 | Status: SHIPPED | OUTPATIENT
Start: 2024-01-11 | End: 2024-01-18

## 2024-01-11 NOTE — TELEPHONE ENCOUNTER
Pt informed via phone call. She will go to lab in a week    ----- Message from Estela Castillo NP sent at 1/11/2024  9:30 AM CST -----  Please inform patient via telephone that her urine cx came back positive for a UTI. [facility] Repeat urine cx after completion of antibiotic therapy (in 1 week). Order placed.

## 2024-01-16 ENCOUNTER — TELEPHONE (OUTPATIENT)
Dept: UROLOGY | Facility: CLINIC | Age: 69
End: 2024-01-16
Payer: COMMERCIAL

## 2024-01-16 ENCOUNTER — PATIENT MESSAGE (OUTPATIENT)
Dept: UROLOGY | Facility: CLINIC | Age: 69
End: 2024-01-16
Payer: COMMERCIAL

## 2024-01-16 DIAGNOSIS — N20.0 RIGHT NEPHROLITHIASIS: Primary | ICD-10-CM

## 2024-01-16 NOTE — TELEPHONE ENCOUNTER
----- Message from Estela Castillo NP sent at 1/16/2024  2:09 PM CST -----  Please inform patient via telephone that her U/S of kidneys and bladder showed two probable small kidney stones. Since patient is not having any pain will monitor with KUB in 6 months. Order placed. F/U in 6 months or sooner if needed.

## 2024-01-16 NOTE — PROGRESS NOTES
Diagnoses and all orders for this visit:    Right nephrolithiasis  -     X-Ray Abdomen AP 1 View; Future in 6 months.    Follow-up in 6 months or sooner if needed.    Estela Castillo, NP

## 2024-01-16 NOTE — TELEPHONE ENCOUNTER
Spoke with pt about results and pt confirmed. Pt stated she would schedule her xray and follow up on her time due to her schedule. I voiced my understanding

## 2024-01-17 ENCOUNTER — TELEPHONE (OUTPATIENT)
Dept: UROLOGY | Facility: CLINIC | Age: 69
End: 2024-01-17
Payer: COMMERCIAL

## 2024-01-17 NOTE — TELEPHONE ENCOUNTER
During clinic, I notified Dr. Hager that pt's last day of Cipro is this Saturday 1/20/24. There is not time to complete another urine culture after completion of antibiotics but before surgery. He states it is ok to proceed with surgery without repeating urine culture as he will collect a sample in the OR.

## 2024-01-21 ENCOUNTER — PATIENT MESSAGE (OUTPATIENT)
Dept: UROLOGY | Facility: CLINIC | Age: 69
End: 2024-01-21
Payer: COMMERCIAL

## 2024-01-24 ENCOUNTER — TELEPHONE (OUTPATIENT)
Dept: UROLOGY | Facility: CLINIC | Age: 69
End: 2024-01-24
Payer: COMMERCIAL

## 2024-01-24 NOTE — TELEPHONE ENCOUNTER
----- Message from Estela Castillo NP sent at 1/24/2024  3:25 PM CST -----  Please inform patient via telephone that her urine cx was normal. Her UTI has resolved.

## 2024-02-14 ENCOUNTER — PATIENT MESSAGE (OUTPATIENT)
Dept: ENDOCRINOLOGY | Facility: CLINIC | Age: 69
End: 2024-02-14
Payer: COMMERCIAL

## 2024-02-22 ENCOUNTER — PATIENT MESSAGE (OUTPATIENT)
Dept: UROLOGY | Facility: CLINIC | Age: 69
End: 2024-02-22
Payer: COMMERCIAL

## 2024-02-22 DIAGNOSIS — Z01.818 PRE-OP TESTING: Primary | ICD-10-CM

## 2024-02-22 NOTE — TELEPHONE ENCOUNTER
Patient would like to move forward with cysto with uds procedure, does she need a new appointment or can I call her with surgery dates?

## 2024-03-01 ENCOUNTER — TELEPHONE (OUTPATIENT)
Dept: UROLOGY | Facility: CLINIC | Age: 69
End: 2024-03-01
Payer: COMMERCIAL

## 2024-03-01 DIAGNOSIS — N30.00 ACUTE CYSTITIS WITHOUT HEMATURIA: Primary | ICD-10-CM

## 2024-03-01 RX ORDER — CIPROFLOXACIN 500 MG/1
500 TABLET ORAL EVERY 12 HOURS
Qty: 10 TABLET | Refills: 0 | Status: SHIPPED | OUTPATIENT
Start: 2024-03-01 | End: 2024-03-07

## 2024-03-01 NOTE — TELEPHONE ENCOUNTER
----- Message from Estela Castillo NP sent at 3/1/2024  9:23 AM CST -----  Please inform patient via telephone that her urine cx came back positive for a UTI. Script for Cipro sent to [facility]. Start antibiotic today.

## 2024-03-07 PROBLEM — N39.0 RECURRENT UTI (URINARY TRACT INFECTION): Status: ACTIVE | Noted: 2024-03-07

## 2024-03-07 PROBLEM — N81.10 BADEN-WALKER GRADE 1 CYSTOCELE: Status: ACTIVE | Noted: 2024-03-07

## 2024-03-07 PROBLEM — N39.46 MIXED STRESS AND URGE URINARY INCONTINENCE: Status: ACTIVE | Noted: 2024-03-07

## 2024-03-28 ENCOUNTER — OFFICE VISIT (OUTPATIENT)
Dept: UROLOGY | Facility: CLINIC | Age: 69
End: 2024-03-28
Payer: MEDICARE

## 2024-03-28 VITALS
BODY MASS INDEX: 19.66 KG/M2 | HEART RATE: 78 BPM | HEIGHT: 71 IN | WEIGHT: 140.44 LBS | DIASTOLIC BLOOD PRESSURE: 73 MMHG | SYSTOLIC BLOOD PRESSURE: 116 MMHG

## 2024-03-28 DIAGNOSIS — N81.10 BADEN-WALKER GRADE 1 CYSTOCELE: ICD-10-CM

## 2024-03-28 DIAGNOSIS — N39.0 RECURRENT UTI (URINARY TRACT INFECTION): ICD-10-CM

## 2024-03-28 DIAGNOSIS — N39.46 MIXED STRESS AND URGE URINARY INCONTINENCE: ICD-10-CM

## 2024-03-28 DIAGNOSIS — Z98.890 POST-OPERATIVE STATE: Primary | ICD-10-CM

## 2024-03-28 PROCEDURE — 99999 PR PBB SHADOW E&M-EST. PATIENT-LVL IV: CPT | Mod: PBBFAC,,, | Performed by: NURSE PRACTITIONER

## 2024-03-28 PROCEDURE — 99024 POSTOP FOLLOW-UP VISIT: CPT | Mod: POP,,, | Performed by: NURSE PRACTITIONER

## 2024-03-28 PROCEDURE — 99214 OFFICE O/P EST MOD 30 MIN: CPT | Mod: PBBFAC,PO | Performed by: NURSE PRACTITIONER

## 2024-03-28 NOTE — PROGRESS NOTES
Subjective:       Patient ID: Martha Mcfarlane is a 69 y.o. female.    Chief Complaint: Post-op Evaluation    Patient is here today for her post-op evaluation. She is S/P cysto with UDS by Dr. Hager on 3/7/2024. Patient reports she is doing well at this time. Currently taking solifenacin 5 mg daily and tamsulosin 0.4 mg every evening.     Follow-up  Chronicity: S/P cysto with UDS. Episode onset: 3/7/2024. The problem occurs daily. The problem has been rapidly improving. Pertinent negatives include no abdominal pain, change in bowel habit, chills, fever, headaches, nausea, swollen glands, urinary symptoms, vomiting or weakness. Nothing aggravates the symptoms. Treatments tried: cysto with UDS.     Review of Systems   Constitutional:  Negative for chills and fever.   Gastrointestinal:  Negative for abdominal pain, change in bowel habit, nausea and vomiting.   Genitourinary:  Negative for decreased urine volume, difficulty urinating, dysuria, flank pain, frequency, hematuria, nocturia and urgency.   Neurological:  Negative for dizziness, weakness and headaches.   Psychiatric/Behavioral: Negative.           Objective:      Physical Exam  Vitals and nursing note reviewed.   Constitutional:       General: She is not in acute distress.     Appearance: She is well-developed and normal weight. She is not ill-appearing.   HENT:      Head: Normocephalic and atraumatic.   Eyes:      Pupils: Pupils are equal, round, and reactive to light.   Cardiovascular:      Rate and Rhythm: Normal rate.   Pulmonary:      Effort: Pulmonary effort is normal. No respiratory distress.   Abdominal:      Palpations: Abdomen is soft.      Tenderness: There is no abdominal tenderness.   Musculoskeletal:         General: Normal range of motion.      Cervical back: Normal range of motion.   Skin:     General: Skin is warm and dry.   Neurological:      Mental Status: She is alert and oriented to person, place, and time.      Coordination: Coordination  normal.   Psychiatric:         Mood and Affect: Mood normal.         Behavior: Behavior normal.         Thought Content: Thought content normal.         Judgment: Judgment normal.         Assessment:       Problem List Items Addressed This Visit       RESOLVED: Recurrent UTI (urinary tract infection)    Mixed stress and urge urinary incontinence    Sonoita-Walker grade 1 cystocele     Other Visit Diagnoses       Post-operative state    -  Primary            Plan:           Martha was seen today for post-op evaluation.    Diagnoses and all orders for this visit:    Post-operative state    Mixed stress and urge urinary incontinence    Sonoita-Walker grade 1 cystocele    Recurrent UTI (urinary tract infection)    Other orders  Continue taking solifenacin (Vesicare) 5 mg daily for OAB symotoms.  Continue taking tamsulosin (Flomax) 0.4 mg every evening.    Follow-up in 1 year or sooner if needed.    Estela Castillo, DNP

## 2024-03-28 NOTE — PATIENT INSTRUCTIONS
Continue taking solifenacin (Vesicare) 5 mg daily for OAB symotoms.  Continue taking tamsulosin (Flomax) 0.4 mg every evening.  Follow-up in 1 year or sooner if needed.

## 2024-04-09 ENCOUNTER — PATIENT MESSAGE (OUTPATIENT)
Dept: INTERNAL MEDICINE | Facility: CLINIC | Age: 69
End: 2024-04-09
Payer: COMMERCIAL

## 2024-04-11 ENCOUNTER — PATIENT MESSAGE (OUTPATIENT)
Dept: ENDOCRINOLOGY | Facility: CLINIC | Age: 69
End: 2024-04-11
Payer: COMMERCIAL

## 2024-04-29 ENCOUNTER — PATIENT MESSAGE (OUTPATIENT)
Dept: INTERNAL MEDICINE | Facility: CLINIC | Age: 69
End: 2024-04-29
Payer: COMMERCIAL

## 2024-04-29 ENCOUNTER — PATIENT MESSAGE (OUTPATIENT)
Dept: ADMINISTRATIVE | Facility: HOSPITAL | Age: 69
End: 2024-04-29
Payer: COMMERCIAL

## 2024-04-29 DIAGNOSIS — Z71.84 TRAVEL ADVICE ENCOUNTER: ICD-10-CM

## 2024-04-29 DIAGNOSIS — G47.00 INSOMNIA, UNSPECIFIED TYPE: ICD-10-CM

## 2024-04-29 RX ORDER — ZOLPIDEM TARTRATE 5 MG/1
5 TABLET ORAL NIGHTLY PRN
Qty: 30 TABLET | Refills: 1 | Status: SHIPPED | OUTPATIENT
Start: 2024-04-29 | End: 2024-10-28

## 2024-04-29 NOTE — TELEPHONE ENCOUNTER
Pt last saw PCP 10/25/23      Patient comment: I inadvertently did not refill the one refill before the  date. I am using less Ambien now. The one refill I did since my last visit with you in 2023 has lasted me until now. I take the 5mg pill and cut it in half and it works fine for me.

## 2024-04-29 NOTE — TELEPHONE ENCOUNTER
No care due was identified.  [facility] Embedded Care Due Messages. Reference number: [id number].   4/29/2024 10:37:56 AM CDT

## 2024-04-30 ENCOUNTER — PATIENT OUTREACH (OUTPATIENT)
Dept: ADMINISTRATIVE | Facility: HOSPITAL | Age: 69
End: 2024-04-30
Payer: COMMERCIAL

## 2024-04-30 DIAGNOSIS — Z00.00 ENCOUNTER FOR MEDICARE ANNUAL WELLNESS EXAM: ICD-10-CM

## 2024-04-30 DIAGNOSIS — Z12.31 SCREENING MAMMOGRAM, ENCOUNTER FOR: Primary | ICD-10-CM

## 2024-06-10 ENCOUNTER — PATIENT MESSAGE (OUTPATIENT)
Dept: INTERNAL MEDICINE | Facility: CLINIC | Age: 69
End: 2024-06-10
Payer: COMMERCIAL

## 2024-06-10 PROBLEM — N39.0 RECURRENT UTI (URINARY TRACT INFECTION): Status: RESOLVED | Noted: 2024-03-07 | Resolved: 2024-06-10

## 2024-06-12 ENCOUNTER — PATIENT MESSAGE (OUTPATIENT)
Dept: INTERNAL MEDICINE | Facility: CLINIC | Age: 69
End: 2024-06-12
Payer: COMMERCIAL

## 2024-06-27 ENCOUNTER — OFFICE VISIT (OUTPATIENT)
Dept: ENDOCRINOLOGY | Facility: CLINIC | Age: 69
End: 2024-06-27
Payer: MEDICARE

## 2024-06-27 VITALS
OXYGEN SATURATION: 98 % | SYSTOLIC BLOOD PRESSURE: 130 MMHG | HEART RATE: 62 BPM | WEIGHT: 139.31 LBS | HEIGHT: 71 IN | BODY MASS INDEX: 19.5 KG/M2 | DIASTOLIC BLOOD PRESSURE: 72 MMHG

## 2024-06-27 DIAGNOSIS — M81.0 OSTEOPOROSIS WITHOUT CURRENT PATHOLOGICAL FRACTURE, UNSPECIFIED OSTEOPOROSIS TYPE: Primary | ICD-10-CM

## 2024-06-27 PROCEDURE — 99999 PR PBB SHADOW E&M-EST. PATIENT-LVL III: CPT | Mod: PBBFAC,,, | Performed by: INTERNAL MEDICINE

## 2024-06-27 PROCEDURE — G2211 COMPLEX E/M VISIT ADD ON: HCPCS | Mod: S$PBB,,, | Performed by: INTERNAL MEDICINE

## 2024-06-27 PROCEDURE — 99213 OFFICE O/P EST LOW 20 MIN: CPT | Mod: PBBFAC | Performed by: INTERNAL MEDICINE

## 2024-06-27 PROCEDURE — 99213 OFFICE O/P EST LOW 20 MIN: CPT | Mod: S$PBB,,, | Performed by: INTERNAL MEDICINE

## 2024-06-27 RX ORDER — ESTRADIOL 0.1 MG/G
CREAM VAGINAL
COMMUNITY

## 2024-06-27 NOTE — ASSESSMENT & PLAN NOTE
Risk factors: postmenopausal status (did not use MHT), , family history (mother) and vitamin D deficiency  No previous fragility fractures   FRAX is elevated 20% and 6.1% with loss of BMD at the right femoral neck -2.5 --> -2.7    Due for DXA scan in 2026, discussed importance of scheduling in the same location to allow for comparison    Secondary evaluation:   Normal in the past: PTH, renal function, 24 hr urine for negro/creat  Consider TTG/IgA, testosterone, ferritin, tryptase, hypercortisolism  Serum calcium, creatinine, thyroid function tests and liver enzymes are normal.     Continue calcium and vitamin D  Continue to stay active, exercise and focus on strength and core    Treatment options and potential side effects discussed for evenity due to BMD change at FN and high fracture risk

## 2024-06-27 NOTE — PROGRESS NOTES
"Subjective:      Patient ID: Martha Mcfarlane is a 68 y.o. female.    Chief Complaint:  Follow-up and Osteoporosis      History of Present Illness    Ms. Mcfarlane is a 68 y.o. female who is here for a follow-up visit for evaluation of osteoporosis.  Osteoporosis diagnosed with DXA done at [facility]. Last DXA scan demonstrates T score of -3.2 at total hip (LS -2.6 and FN -2.7) (6/2018).     Reclast 5 mg in 2014, 2022 and 2023. No side effects     Supplements:  Bone Strength (OTC) calcium 1000 mg and calcium 770 mg (serving: three tablets). Plant based supplement.   Vitamin D takes a daily (31ng/ml)    Since her last visit with me, she reports a fall one year ago. Tripped over a buckled asphalt in a parking lot, but not fractures.   Denies falls or fractures.   Nephrolithiasis - denies new active stones. Possible right sided nephrolithiasis.   Urine calcium of 265 mg/specimen (range 253 - 265 mg/spec)    Diet: eats green leafy vegetables, dairy    Previously tried:  Current treatment: Reclast 5 mg   Prolia 60 mg due to severe constipation  Tried alendronate - last dose was 2014  Restarted alendronate 7/2017 but stopped it September 2017 due to blood pressure issues.       Previous evaluation included mildly elevated parathyroid hormone levels (81 - 108 pg/ml) with normal calcium. Last PTH level was 57 pg/ml. Normal SPEP and vitamin D levels. Mildly elevated urine calcium on ca supplements      Exercise: regular exercise including walking  Balance: good   Denies CAD/strokes in the past.     Review of Systems  No recent illness   Otherwise neg    Objective:   Physical Exam  Vitals:    06/27/24 1122   BP: 130/72   BP Location: Right arm   Patient Position: Sitting   BP Method: Medium (Manual)   Pulse: 62   SpO2: 98%   Weight: 63.2 kg (139 lb 5.3 oz)   Height: 5' 11" (1.803 m)         BP Readings from Last 3 Encounters:   06/27/24 130/72   03/28/24 116/73   03/07/24 139/60     Wt Readings from Last 1 " Encounters:   06/27/24 1122 63.2 kg (139 lb 5.3 oz)         Body mass index is 19.43 kg/m².    Lab Review:   Lab Results   Component Value Date    HGBA1C 5.1 10/25/2023     Lab Results   Component Value Date    CHOL 145 10/25/2023    HDL 50 10/25/2023    LDLCALC 85.0 10/25/2023    TRIG 50 10/25/2023    CHOLHDL 34.5 10/25/2023     Lab Results   Component Value Date     (H) 02/28/2024    K 5.0 02/28/2024     02/28/2024    CO2 31 (H) 02/28/2024    GLU 90 02/28/2024    BUN 18 (H) 02/28/2024    CREATININE 0.65 02/28/2024    CALCIUM 9.4 02/28/2024    PROT 7.4 10/27/2021    ALBUMIN 4.5 05/17/2023    BILITOT 0.9 10/27/2021    ALKPHOS 78 10/27/2021    AST 20 10/27/2021    ALT 17 10/27/2021    ANIONGAP 10 02/28/2024    ESTGFRAFRICA >60.0 10/27/2021    EGFRNONAA >60.0 10/27/2021    TSH 0.974 10/25/2023         Assessment and Plan     Osteoporosis  Risk factors: postmenopausal status (did not use MHT), , family history (mother) and vitamin D deficiency  No previous fragility fractures   FRAX is elevated 20% and 6.1% with loss of BMD at the right femoral neck -2.5 --> -2.7    Due for DXA scan in 2026, discussed importance of scheduling in the same location to allow for comparison    Secondary evaluation:   Normal in the past: PTH, renal function, 24 hr urine for negro/creat  Consider TTG/IgA, testosterone, ferritin, tryptase, hypercortisolism  Serum calcium, creatinine, thyroid function tests and liver enzymes are normal.     Continue calcium and vitamin D  Continue to stay active, exercise and focus on strength and core    Treatment options and potential side effects discussed for evenity due to BMD change at FN and high fracture risk

## 2024-06-27 NOTE — PATIENT INSTRUCTIONS
"Evenity    Osteoporosis medications  These are the medications we discussed for osteoporosis treatment:    - Bisphosphonates:         - these slow down bone loss         - oral forms (Fosamax and Actonel are examples) - taken once weekly or once monthly         - IV form (Reclast) - given intravenously once yearly    - Evenity (romozosumab)   - medications that "build bone" or increases bone formation and slow donw bone loss              - 2 subcutaneous injection (under the skin) taken once monthly to schedule at [facility] for 12 months   - need a medicine to "seal" in the bone you have gained, typically we use reclast or prolia once you have completed the 12 month course.   For more information on medications: https://www.nof.org/patients/treatment/medicationadherence/      Vitamin D 1000 IUs - 1400 IUs daily   "

## 2024-07-01 ENCOUNTER — TELEPHONE (OUTPATIENT)
Dept: INFECTIOUS DISEASES | Facility: HOSPITAL | Age: 69
End: 2024-07-01
Payer: MEDICARE

## 2024-07-01 NOTE — TELEPHONE ENCOUNTER
Called Pt to schedule Evenity injection. Spoke with Pt's spouse who stated Pt was out at the moment. Left callback number for her to callback when she is ready to schedule

## 2024-07-08 ENCOUNTER — TELEPHONE (OUTPATIENT)
Dept: INFECTIOUS DISEASES | Facility: HOSPITAL | Age: 69
End: 2024-07-08
Payer: MEDICARE

## 2024-07-08 NOTE — TELEPHONE ENCOUNTER
Tried to reach pt by phone to schedule her EVENITY injection. I then left message and the call back number [phone].

## 2024-07-17 ENCOUNTER — INFUSION (OUTPATIENT)
Dept: INFECTIOUS DISEASES | Facility: HOSPITAL | Age: 69
End: 2024-07-17
Payer: MEDICARE

## 2024-07-17 ENCOUNTER — TELEPHONE (OUTPATIENT)
Dept: ADMINISTRATIVE | Facility: CLINIC | Age: 69
End: 2024-07-17
Payer: MEDICARE

## 2024-07-17 VITALS
WEIGHT: 140.56 LBS | HEART RATE: 72 BPM | TEMPERATURE: 98 F | OXYGEN SATURATION: 100 % | HEIGHT: 71 IN | BODY MASS INDEX: 19.68 KG/M2 | SYSTOLIC BLOOD PRESSURE: 148 MMHG | RESPIRATION RATE: 20 BRPM | DIASTOLIC BLOOD PRESSURE: 65 MMHG

## 2024-07-17 DIAGNOSIS — M81.0 OSTEOPOROSIS WITHOUT CURRENT PATHOLOGICAL FRACTURE, UNSPECIFIED OSTEOPOROSIS TYPE: ICD-10-CM

## 2024-07-17 DIAGNOSIS — R82.994 HYPERCALCIURIA: Primary | ICD-10-CM

## 2024-07-17 PROCEDURE — 63600175 PHARM REV CODE 636 W HCPCS: Mod: JZ,JG | Performed by: INTERNAL MEDICINE

## 2024-07-17 PROCEDURE — 96372 THER/PROPH/DIAG INJ SC/IM: CPT

## 2024-07-17 RX ADMIN — ROMOSOZUMAB-AQQG 210 MG: 105 INJECTION, SOLUTION SUBCUTANEOUS at 04:07

## 2024-08-12 ENCOUNTER — TELEPHONE (OUTPATIENT)
Dept: INTERNAL MEDICINE | Facility: CLINIC | Age: 69
End: 2024-08-12
Payer: MEDICARE

## 2024-08-12 NOTE — TELEPHONE ENCOUNTER
----- Message from Adri Samano sent at 8/12/2024  1:38 PM CDT -----  Regarding: Requesting a callback from the nurse  Pt called to request urine labs  to test [place] UTI [place].    Pt can be reached at [phone]    Thank you!

## 2024-08-14 ENCOUNTER — INFUSION (OUTPATIENT)
Dept: INFECTIOUS DISEASES | Facility: HOSPITAL | Age: 69
End: 2024-08-14
Attending: PHYSICIAN ASSISTANT
Payer: MEDICARE

## 2024-08-14 VITALS
TEMPERATURE: 99 F | DIASTOLIC BLOOD PRESSURE: 69 MMHG | OXYGEN SATURATION: 97 % | BODY MASS INDEX: 19.12 KG/M2 | RESPIRATION RATE: 19 BRPM | WEIGHT: 141.13 LBS | HEART RATE: 70 BPM | HEIGHT: 72 IN | SYSTOLIC BLOOD PRESSURE: 147 MMHG

## 2024-08-14 DIAGNOSIS — M81.0 OSTEOPOROSIS WITHOUT CURRENT PATHOLOGICAL FRACTURE, UNSPECIFIED OSTEOPOROSIS TYPE: ICD-10-CM

## 2024-08-14 DIAGNOSIS — R82.994 HYPERCALCIURIA: Primary | ICD-10-CM

## 2024-08-14 PROCEDURE — 96372 THER/PROPH/DIAG INJ SC/IM: CPT

## 2024-08-14 NOTE — PROGRESS NOTES
Pt arrives to clinic for 2/12 Evenity. Given subq in bilateral arms. Pt tolerated well. D/c'd from clinic in NAD, pt given next appt.

## 2024-08-27 ENCOUNTER — ANESTHESIA EVENT (OUTPATIENT)
Dept: SURGERY | Facility: HOSPITAL | Age: 69
End: 2024-08-27
Payer: MEDICARE

## 2024-08-27 ENCOUNTER — HOSPITAL ENCOUNTER (INPATIENT)
Facility: HOSPITAL | Age: 69
LOS: 3 days | Discharge: HOME HEALTH | DRG: 522 | End: 2024-08-30
Attending: EMERGENCY MEDICINE | Admitting: INTERNAL MEDICINE
Payer: MEDICARE

## 2024-08-27 DIAGNOSIS — S72.001A CLOSED DISPLACED FRACTURE OF RIGHT FEMORAL NECK: ICD-10-CM

## 2024-08-27 DIAGNOSIS — S72.009A HIP FRACTURE: ICD-10-CM

## 2024-08-27 DIAGNOSIS — R82.994 HYPERCALCIURIA: ICD-10-CM

## 2024-08-27 DIAGNOSIS — I70.0 AORTIC CALCIFICATION: ICD-10-CM

## 2024-08-27 DIAGNOSIS — N81.10 BADEN-WALKER GRADE 1 CYSTOCELE: ICD-10-CM
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
## 2024-08-27 DIAGNOSIS — T14.90XA INJURY: ICD-10-CM

## 2024-08-27 DIAGNOSIS — N39.46 MIXED STRESS AND URGE URINARY INCONTINENCE: ICD-10-CM

## 2024-08-27 DIAGNOSIS — S72.001A CLOSED FRACTURE OF RIGHT HIP, INITIAL ENCOUNTER: Primary | ICD-10-CM

## 2024-08-27 DIAGNOSIS — F51.01 PRIMARY INSOMNIA: ICD-10-CM

## 2024-08-27 DIAGNOSIS — Z85.820 HISTORY OF MELANOMA: ICD-10-CM

## 2024-08-27 DIAGNOSIS — D62 POSTOPERATIVE ANEMIA DUE TO ACUTE BLOOD LOSS: ICD-10-CM

## 2024-08-27 DIAGNOSIS — M65.4 TENOSYNOVITIS, DE QUERVAIN: ICD-10-CM

## 2024-08-27 PROBLEM — K92.1 MELENA: Status: RESOLVED | Noted: 2020-04-08 | Resolved: 2024-08-27

## 2024-08-27 PROBLEM — D64.9 SYMPTOMATIC ANEMIA: Status: RESOLVED | Noted: 2020-04-03 | Resolved: 2024-08-27

## 2024-08-27 PROBLEM — M25.521 RIGHT ELBOW PAIN: Status: RESOLVED | Noted: 2021-10-06 | Resolved: 2024-08-27

## 2024-08-27 LAB
25(OH)D3+25(OH)D2 SERPL-MCNC: 32 NG/ML (ref 30–96)
ABO + RH BLD: NORMAL
ALBUMIN SERPL BCP-MCNC: 4.5 G/DL (ref 3.5–5.2)
ALP SERPL-CCNC: 106 U/L (ref 55–135)
ALT SERPL W/O P-5'-P-CCNC: 20 U/L (ref 10–44)
ANION GAP SERPL CALC-SCNC: 10 MMOL/L (ref 8–16)
AST SERPL-CCNC: 24 U/L (ref 10–40)
BASOPHILS # BLD AUTO: 0.03 K/UL (ref 0–0.2)
BASOPHILS NFR BLD: 0.3 % (ref 0–1.9)
BILIRUB SERPL-MCNC: 0.8 MG/DL (ref 0.1–1)
BILIRUB UR QL STRIP: NEGATIVE
BLD GP AB SCN CELLS X3 SERPL QL: NORMAL
BUN SERPL-MCNC: 19 MG/DL (ref 8–23)
CALCIUM SERPL-MCNC: 9.6 MG/DL (ref 8.7–10.5)
CHLORIDE SERPL-SCNC: 102 MMOL/L (ref 95–110)
CLARITY UR REFRACT.AUTO: CLEAR
CO2 SERPL-SCNC: 26 MMOL/L (ref 23–29)
COLOR UR AUTO: YELLOW
CREAT SERPL-MCNC: 0.7 MG/DL (ref 0.5–1.4)
DIFFERENTIAL METHOD BLD: ABNORMAL
EOSINOPHIL # BLD AUTO: 0 K/UL (ref 0–0.5)
EOSINOPHIL NFR BLD: 0.1 % (ref 0–8)
ERYTHROCYTE [DISTWIDTH] IN BLOOD BY AUTOMATED COUNT: 12.8 % (ref 11.5–14.5)
EST. GFR  (NO RACE VARIABLE): >60 ML/MIN/1.73 M^2
ESTIMATED AVG GLUCOSE: 97 MG/DL (ref 68–131)
GLUCOSE SERPL-MCNC: 101 MG/DL (ref 70–110)
GLUCOSE UR QL STRIP: NEGATIVE
HBA1C MFR BLD: 5 % (ref 4–5.6)
HCT VFR BLD AUTO: 40.4 % (ref 37–48.5)
HGB BLD-MCNC: 13.2 G/DL (ref 12–16)
HGB UR QL STRIP: NEGATIVE
IMM GRANULOCYTES # BLD AUTO: 0.09 K/UL (ref 0–0.04)
IMM GRANULOCYTES NFR BLD AUTO: 0.8 % (ref 0–0.5)
INR PPP: 1 (ref 0.8–1.2)
KETONES UR QL STRIP: ABNORMAL
LEUKOCYTE ESTERASE UR QL STRIP: NEGATIVE
LYMPHOCYTES # BLD AUTO: 0.6 K/UL (ref 1–4.8)
LYMPHOCYTES NFR BLD: 5 % (ref 18–48)
MAGNESIUM SERPL-MCNC: 1.9 MG/DL (ref 1.6–2.6)
MCH RBC QN AUTO: 29.3 PG (ref 27–31)
MCHC RBC AUTO-ENTMCNC: 32.7 G/DL (ref 32–36)
MCV RBC AUTO: 90 FL (ref 82–98)
MONOCYTES # BLD AUTO: 0.4 K/UL (ref 0.3–1)
MONOCYTES NFR BLD: 3.1 % (ref 4–15)
NEUTROPHILS # BLD AUTO: 10.2 K/UL (ref 1.8–7.7)
NEUTROPHILS NFR BLD: 90.7 % (ref 38–73)
NITRITE UR QL STRIP: NEGATIVE
NRBC BLD-RTO: 0 /100 WBC
PH UR STRIP: 7 [PH] (ref 5–8)
PHOSPHATE SERPL-MCNC: 3.5 MG/DL (ref 2.7–4.5)
PLATELET # BLD AUTO: 158 K/UL (ref 150–450)
PMV BLD AUTO: 10.2 FL (ref 9.2–12.9)
POTASSIUM SERPL-SCNC: 3.7 MMOL/L (ref 3.5–5.1)
PREALB SERPL-MCNC: 17 MG/DL (ref 20–43)
PROT SERPL-MCNC: 7.5 G/DL (ref 6–8.4)
PROT UR QL STRIP: NEGATIVE
PROTHROMBIN TIME: 11.1 SEC (ref 9–12.5)
RBC # BLD AUTO: 4.5 M/UL (ref 4–5.4)
SODIUM SERPL-SCNC: 138 MMOL/L (ref 136–145)
SP GR UR STRIP: 1.01 (ref 1–1.03)
SPECIMEN OUTDATE: NORMAL
TRANSFERRIN SERPL-MCNC: 234 MG/DL (ref 200–375)
URN SPEC COLLECT METH UR: ABNORMAL
WBC # BLD AUTO: 11.27 K/UL (ref 3.9–12.7)

## 2024-08-27 PROCEDURE — 25000003 PHARM REV CODE 250: Performed by: INTERNAL MEDICINE

## 2024-08-27 PROCEDURE — 93010 ELECTROCARDIOGRAM REPORT: CPT | Mod: ,,, | Performed by: INTERNAL MEDICINE

## 2024-08-27 PROCEDURE — 84134 ASSAY OF PREALBUMIN: CPT

## 2024-08-27 PROCEDURE — 93005 ELECTROCARDIOGRAM TRACING: CPT

## 2024-08-27 PROCEDURE — 82306 VITAMIN D 25 HYDROXY: CPT

## 2024-08-27 PROCEDURE — 84100 ASSAY OF PHOSPHORUS: CPT

## 2024-08-27 PROCEDURE — 21400001 HC TELEMETRY ROOM

## 2024-08-27 PROCEDURE — 25000003 PHARM REV CODE 250: Performed by: EMERGENCY MEDICINE

## 2024-08-27 PROCEDURE — 96374 THER/PROPH/DIAG INJ IV PUSH: CPT

## 2024-08-27 PROCEDURE — 86901 BLOOD TYPING SEROLOGIC RH(D): CPT | Performed by: INTERNAL MEDICINE

## 2024-08-27 PROCEDURE — 99285 EMERGENCY DEPT VISIT HI MDM: CPT | Mod: 25

## 2024-08-27 PROCEDURE — 80053 COMPREHEN METABOLIC PANEL: CPT | Performed by: EMERGENCY MEDICINE

## 2024-08-27 PROCEDURE — 63600175 PHARM REV CODE 636 W HCPCS: Performed by: EMERGENCY MEDICINE

## 2024-08-27 PROCEDURE — 81003 URINALYSIS AUTO W/O SCOPE: CPT | Performed by: INTERNAL MEDICINE

## 2024-08-27 PROCEDURE — 83036 HEMOGLOBIN GLYCOSYLATED A1C: CPT

## 2024-08-27 PROCEDURE — 84466 ASSAY OF TRANSFERRIN: CPT

## 2024-08-27 PROCEDURE — 85610 PROTHROMBIN TIME: CPT

## 2024-08-27 PROCEDURE — 11000001 HC ACUTE MED/SURG PRIVATE ROOM

## 2024-08-27 PROCEDURE — 86900 BLOOD TYPING SEROLOGIC ABO: CPT | Performed by: INTERNAL MEDICINE

## 2024-08-27 PROCEDURE — 83735 ASSAY OF MAGNESIUM: CPT

## 2024-08-27 PROCEDURE — 85025 COMPLETE CBC W/AUTO DIFF WBC: CPT | Performed by: EMERGENCY MEDICINE

## 2024-08-27 RX ORDER — ONDANSETRON HYDROCHLORIDE 2 MG/ML
4 INJECTION, SOLUTION INTRAVENOUS EVERY 12 HOURS PRN
Status: DISCONTINUED | OUTPATIENT
Start: 2024-08-27 | End: 2024-08-30 | Stop reason: HOSPADM

## 2024-08-27 RX ORDER — AMOXICILLIN 250 MG
1 CAPSULE ORAL 2 TIMES DAILY
Status: DISCONTINUED | OUTPATIENT
Start: 2024-08-27 | End: 2024-08-30 | Stop reason: HOSPADM

## 2024-08-27 RX ORDER — OXYCODONE HYDROCHLORIDE 5 MG/1
5 TABLET ORAL EVERY 4 HOURS PRN
Status: DISCONTINUED | OUTPATIENT
Start: 2024-08-27 | End: 2024-08-28

## 2024-08-27 RX ORDER — POLYETHYLENE GLYCOL 3350 17 G/17G
17 POWDER, FOR SOLUTION ORAL DAILY
Status: DISCONTINUED | OUTPATIENT
Start: 2024-08-28 | End: 2024-08-30 | Stop reason: HOSPADM

## 2024-08-27 RX ORDER — OMEGA-3/DHA/EPA/FISH OIL 300-1000MG
2 CAPSULE,DELAYED RELEASE (ENTERIC COATED) ORAL DAILY
Status: DISCONTINUED | OUTPATIENT
Start: 2024-08-28 | End: 2024-08-30 | Stop reason: HOSPADM

## 2024-08-27 RX ORDER — SODIUM CHLORIDE 0.9 % (FLUSH) 0.9 %
10 SYRINGE (ML) INJECTION
Status: DISCONTINUED | OUTPATIENT
Start: 2024-08-27 | End: 2024-08-27

## 2024-08-27 RX ORDER — METHOCARBAMOL 500 MG/1
500 TABLET, FILM COATED ORAL 4 TIMES DAILY
Status: DISCONTINUED | OUTPATIENT
Start: 2024-08-27 | End: 2024-08-28

## 2024-08-27 RX ORDER — OXYCODONE HYDROCHLORIDE 10 MG/1
10 TABLET ORAL EVERY 4 HOURS PRN
Status: DISCONTINUED | OUTPATIENT
Start: 2024-08-27 | End: 2024-08-28

## 2024-08-27 RX ORDER — TALC
6 POWDER (GRAM) TOPICAL NIGHTLY PRN
Status: DISCONTINUED | OUTPATIENT
Start: 2024-08-27 | End: 2024-08-30 | Stop reason: HOSPADM

## 2024-08-27 RX ORDER — CHOLECALCIFEROL (VITAMIN D3) 25 MCG
2000 TABLET ORAL DAILY
Status: DISCONTINUED | OUTPATIENT
Start: 2024-08-28 | End: 2024-08-30 | Stop reason: HOSPADM

## 2024-08-27 RX ORDER — SODIUM CHLORIDE 0.9 % (FLUSH) 0.9 %
10 SYRINGE (ML) INJECTION
Status: DISCONTINUED | OUTPATIENT
Start: 2024-08-27 | End: 2024-08-30 | Stop reason: HOSPADM

## 2024-08-27 RX ORDER — MORPHINE SULFATE 4 MG/ML
4 INJECTION, SOLUTION INTRAMUSCULAR; INTRAVENOUS
Status: COMPLETED | OUTPATIENT
Start: 2024-08-27 | End: 2024-08-27

## 2024-08-27 RX ORDER — ZOLPIDEM TARTRATE 5 MG/1
5 TABLET ORAL NIGHTLY PRN
Status: DISCONTINUED | OUTPATIENT
Start: 2024-08-27 | End: 2024-08-30 | Stop reason: HOSPADM

## 2024-08-27 RX ORDER — ACETAMINOPHEN 500 MG
1000 TABLET ORAL EVERY 8 HOURS
Status: DISCONTINUED | OUTPATIENT
Start: 2024-08-27 | End: 2024-08-28

## 2024-08-27 RX ORDER — BISACODYL 10 MG/1
10 SUPPOSITORY RECTAL DAILY PRN
Status: DISCONTINUED | OUTPATIENT
Start: 2024-08-27 | End: 2024-08-30 | Stop reason: HOSPADM

## 2024-08-27 RX ORDER — ACETAMINOPHEN 500 MG
1000 TABLET ORAL
Status: COMPLETED | OUTPATIENT
Start: 2024-08-27 | End: 2024-08-27

## 2024-08-27 RX ORDER — PREGABALIN 75 MG/1
75 CAPSULE ORAL NIGHTLY
Status: DISCONTINUED | OUTPATIENT
Start: 2024-08-27 | End: 2024-08-28

## 2024-08-27 RX ORDER — ASCORBIC ACID 500 MG
500 TABLET ORAL DAILY
Status: DISCONTINUED | OUTPATIENT
Start: 2024-08-28 | End: 2024-08-30 | Stop reason: HOSPADM

## 2024-08-27 RX ADMIN — PREGABALIN 75 MG: 75 CAPSULE ORAL at 08:08

## 2024-08-27 RX ADMIN — METHOCARBAMOL 500 MG: 500 TABLET ORAL at 08:08

## 2024-08-27 RX ADMIN — SENNOSIDES AND DOCUSATE SODIUM 1 TABLET: 50; 8.6 TABLET ORAL at 08:08

## 2024-08-27 RX ADMIN — Medication 6 MG: at 10:08

## 2024-08-27 RX ADMIN — ACETAMINOPHEN 1000 MG: 500 TABLET ORAL at 12:08

## 2024-08-27 RX ADMIN — ACETAMINOPHEN 1000 MG: 500 TABLET ORAL at 10:08

## 2024-08-27 RX ADMIN — MORPHINE SULFATE 4 MG: 4 INJECTION INTRAVENOUS at 12:08

## 2024-08-27 NOTE — ASSESSMENT & PLAN NOTE
Chronic condition. Patient takes Ambien 5 mg po nightly to help to treat as needed and will continue in hospital.

## 2024-08-27 NOTE — ASSESSMENT & PLAN NOTE
Patient followed by Urology and patient reports since last cystoscopy and treatment by Urology no further issues with UTIs or incontinence.

## 2024-08-27 NOTE — ED TRIAGE NOTES
Patient comes into the emergency department by POV with complaints of fall. Patient states that she tripped over a hose at home and fell. Pt states she did not lose consciousness and denies hitting her head. Patient denies blood thinners.

## 2024-08-27 NOTE — ASSESSMENT & PLAN NOTE
Patient admitted after trip and fall on her garden hose onto her patio striking her right hip and X-rays revealed closed subcapital right femur fracture. Orthopedics consulted in ED and ordered CT scan of pelvis to better evaluate fracture for operative planning.   Orthopedic surgery consulted and plan to take patient to the OR tomorrow for surgical repair of hip fracture so will make patient NPO after midnight for surgery except medications.   Preoperative assessment: Routine labs, CXR and EKG reviewed. Patient is at low risk for perioperative cardiopulmonary complications. Patient with no active cardiac issues. Patient with good functional mets > 4 and with no significant cardiac clinical risk factors for intermediate risk surgery. Recommend to proceed to surgery as planned with no additional non-invasive cardiac testing required.  Pain control as per Hip Fracture Pathway with multimodal pain regimen with scheduled Tylenol 1000 mg po every 8 hours, Robaxin 500 mg po 4 times daily and Lyrica 75 mg po nightly and oral Oxycodone IR 5-10 mg po every 4 hours prn for breakthrough pain.   DVT prophylaxis pre-op with TEDs/SCDs.   Plan to monitor daily electrolytes and H/H post-op.   Start Apixiban 2.5 mg po BID post-op after surgery for DVT prophylaxis and will need for a total of 30 days after hip fracture surgery.   Will consult PT/OT post-op for gait training and strengthening and restoration of ADLs.   Will consult  and case management to assist with discharge planning for this patient after surgery.    For now patient to be on bedrest and non weight bearing to right lower extremity.

## 2024-08-27 NOTE — ED PROVIDER NOTES
Encounter Date: 8/27/2024       History     Chief Complaint   Patient presents with    Fall     Fall today landing on R hip reports pain at groin denies blood thinners or head injury     69-year-old feed him female, history of osteoporosis, complaining of right groin and hip pain status post fall today.  Patient states that she tripped on a hose on her porch and fell to the ground.  She did not hit her head.  She is not on anticoagulants or antiplatelet agents.  She is complaining of severe pain in her right groin limiting her ability to bear weight.  She did not injure her upper extremities, chest wall or abdomen.    The history is provided by the patient and the spouse.     Review of patient's allergies indicates:   Allergen Reactions    Orange flavor      Severe headache, nausea    Iodine and iodide containing products     Shellfish containing products      Past Medical History:   Diagnosis Date    Age-related osteoporosis with current pathological fracture     Aortic calcification 04/21/2023    Seen on X-ray chest 04/03/2020      [place]-Walker grade 1 cystocele 03/07/2024    Constipation - functional     Encounter for blood transfusion     History of COVID-19 10/27/2021    Kidney calculi     Melanoma     Mitral valve prolapse     Mixed stress and urge urinary incontinence 03/07/2024    Osteoporosis, unspecified     Primary insomnia 01/20/2016    Stroke     tia    Tenosynovitis, de Quervain 10/06/2021     Past Surgical History:   Procedure Laterality Date    APPENDECTOMY      COLON SURGERY      COLONOSCOPY      COLONOSCOPY N/A 12/15/2015    Procedure: COLONOSCOPY;  Surgeon: ADALBERTO Liao MD;  Location: [address]);  Service: Endoscopy;  Laterality: N/A;    CYSTOSCOPY WITH CYSTOMETROGRAPHY N/A 3/7/2024    Procedure: CYSTOSCOPY, WITH CYSTOMETROGRAM;  Surgeon: Jabari Hager MD;  Location: [facility];  Service: Urology;  Laterality: N/A;    CYSTOSCOPY WITH URODYNAMIC TESTING N/A 3/7/2024    Procedure:  CYSTOSCOPY, WITH URODYNAMIC TESTING;  Surgeon: Jabari Hager MD;  Location: [facility];  Service: Urology;  Laterality: N/A;    DE QUERVAIN'S RELEASE Right 10/6/2021    Procedure: RELEASE, HAND, FOR DEQUERVAIN'S TENOSYNOVITIS - right;  Surgeon: Aric Leonard MD;  Location: [facility];  Service: Orthopedics;  Laterality: Right;    ELECTROMYOGRAPHY N/A 3/7/2024    Procedure: EMG (ELECTROMYOGRAPHY);  Surgeon: Jabari Hager MD;  Location: [facility];  Service: Urology;  Laterality: N/A;    ESOPHAGOGASTRODUODENOSCOPY N/A 4/8/2020    Procedure: EGD (ESOPHAGOGASTRODUODENOSCOPY);  Surgeon: Jonathan Roth MD;  Location: [address]);  Service: Endoscopy;  Laterality: N/A;  check stat hct before     labs will be drawn prior-GT    HYSTERECTOMY      INTUSSUSCEPTION REPAIR      LASIK      LEFT COLECTOMY      melanoma on face removal      PARTIAL HYSTERECTOMY      UROFLOWMETRY N/A 3/7/2024    Procedure: UROFLOWMETRY;  Surgeon: Jabari Hager MD;  Location: [facility];  Service: Urology;  Laterality: N/A;    VOIDING URETHROCYSTOGRAPHY N/A 3/7/2024    Procedure: CYSTOURETHROGRAM, VOIDING;  Surgeon: Jabari Hager MD;  Location: [facility];  Service: Urology;  Laterality: N/A;     Family History   Problem Relation Name Age of Onset    Hepatitis Mother      Diabetes Father      Diabetes Brother Goran     Parkinsonism Brother Goran     Hyperlipidemia Brother Marc     Hypertension Brother Marc     Diabetes Brother Marc     Hyperlipidemia Brother Kraig     Hypertension Brother Kraig     Heart disease Brother Faizan     Autism Son       Social History     Tobacco Use    Smoking status: Never    Smokeless tobacco: Never   Substance Use Topics    Alcohol use: Never    Drug use: Never     Review of Systems    Physical Exam     Initial Vitals [08/27/24 1039]   BP Pulse Resp Temp SpO2   (!) 176/86 80 20 98.5 °F (36.9 °C) 97 %      MAP       --         Physical Exam    Nursing note and vitals reviewed.  Constitutional: Vital  signs are normal. She appears well-developed and well-nourished. She is not diaphoretic.  Non-toxic appearance. She does not appear ill. No distress.   HENT:   Head: Normocephalic and atraumatic.   Mouth/Throat: Mucous membranes are normal. Mucous membranes are not dry.   Eyes: Conjunctivae and lids are normal.   Neck: Neck supple.   Normal range of motion.  Cardiovascular:  Normal rate.           Pulmonary/Chest: She exhibits no tenderness.   Abdominal: Abdomen is soft. She exhibits no distension. There is no abdominal tenderness. There is no rebound and no guarding.   Musculoskeletal:      Cervical back: Normal range of motion and neck supple.      Comments: UE nontender/full ROM  LE:  Some mild tenderness to the right groin/inguinal region and pain with ROM at the right hip.  No knee tenderness     Neurological: She is alert and oriented to person, place, and time. She has normal strength. No cranial nerve deficit or sensory deficit. GCS score is 15. GCS eye subscore is 4. GCS verbal subscore is 5. GCS motor subscore is 6.   Skin: Skin is dry and intact. No pallor.   Psychiatric: She has a normal mood and affect. Her speech is normal and behavior is normal.         ED Course   Procedures  Labs Reviewed   CBC W/ AUTO DIFFERENTIAL - Abnormal       Result Value    WBC 11.27      RBC 4.50      Hemoglobin 13.2      Hematocrit 40.4      MCV 90      MCH 29.3      MCHC 32.7      RDW 12.8      Platelets 158      MPV 10.2      Immature Granulocytes 0.8 (*)     Gran # (ANC) 10.2 (*)     Immature Grans (Abs) 0.09 (*)     Lymph # 0.6 (*)     Mono # 0.4      Eos # 0.0      Baso # 0.03      nRBC 0      Gran % 90.7 (*)     Lymph % 5.0 (*)     Mono % 3.1 (*)     Eosinophil % 0.1      Basophil % 0.3      Differential Method Automated     URINALYSIS, REFLEX TO URINE CULTURE - Abnormal    Specimen UA Urine, Clean Catch      Color, UA Yellow      Appearance, UA Clear      pH, UA 7.0      Specific Gravity, UA 1.015      Protein, UA  Negative      Glucose, UA Negative      Ketones, UA 2+ (*)     Bilirubin (UA) Negative      Occult Blood UA Negative      Nitrite, UA Negative      Leukocytes, UA Negative      Narrative:     Specimen Source->Urine   COMPREHENSIVE METABOLIC PANEL    Sodium 138      Potassium 3.7      Chloride 102      CO2 26      Glucose 101      BUN 19      Creatinine 0.7      Calcium 9.6      Total Protein 7.5      Albumin 4.5      Total Bilirubin 0.8      Alkaline Phosphatase 106      AST 24      ALT 20      eGFR >60.0      Anion Gap 10     TYPE & SCREEN          Imaging Results              CT Pelvis Without Contrast (In process)                      CT 3D Rendering WO Independent Workstation (In process)                      X-Ray Femur Ap/Lat Right (Final result)  Result time 08/27/24 15:59:52      Final result by Shay Grullon MD (08/27/24 15:59:52)                   Impression:      Displaced and angulated subcapital fracture of the proximal right femur.    Degenerative changes of the lower lumbar spine.      Electronically signed by: Shay Grullon MD  Date:    08/27/2024  Time:    15:59               Narrative:    EXAMINATION:  XR PELVIS ROUTINE AP; XR FEMUR 2 VIEW RIGHT    CLINICAL HISTORY:  injury;  Injury, unspecified, initial encounter    TECHNIQUE:  AP view of the pelvis was performed AP and lateral radiographs of the femur were obtained..    COMPARISON:  None.    FINDINGS:  There is a subcapital fracture of the proximal right femur with displacement and angulation of the fracture fragments.  There are degenerative changes within the lower lumbar spine.  Multiple surgical clips project over the lumbosacral junction to the right of midline.  There are a couple of phleboliths within the pelvis.                                       X-Ray Pelvis Routine AP (Final result)  Result time 08/27/24 15:59:52   Procedure changed from X-Ray Hip 2 or 3 views Right with Pelvis when performed     Final result by Shay Grullon,  MD (08/27/24 15:59:52)                   Impression:      Displaced and angulated subcapital fracture of the proximal right femur.    Degenerative changes of the lower lumbar spine.      Electronically signed by: Shay Grullon MD  Date:    08/27/2024  Time:    15:59               Narrative:    EXAMINATION:  XR PELVIS ROUTINE AP; XR FEMUR 2 VIEW RIGHT    CLINICAL HISTORY:  injury;  Injury, unspecified, initial encounter    TECHNIQUE:  AP view of the pelvis was performed AP and lateral radiographs of the femur were obtained..    COMPARISON:  None.    FINDINGS:  There is a subcapital fracture of the proximal right femur with displacement and angulation of the fracture fragments.  There are degenerative changes within the lower lumbar spine.  Multiple surgical clips project over the lumbosacral junction to the right of midline.  There are a couple of phleboliths within the pelvis.                                       Medications   melatonin tablet 6 mg (has no administration in time range)   ascorbic acid (vitamin C) tablet 500 mg (has no administration in time range)   vitamin D 1000 units tablet 2,000 Units (has no administration in time range)   omega 3-dha-epa-fish oil capsule 2 capsule (has no administration in time range)   polyethylene glycol packet 17 g (has no administration in time range)   zolpidem tablet 5 mg (has no administration in time range)   pregabalin capsule 75 mg (has no administration in time range)   acetaminophen tablet 1,000 mg (has no administration in time range)   bisacodyL suppository 10 mg (has no administration in time range)   ondansetron injection 4 mg (has no administration in time range)   sodium chloride 0.9% flush 10 mL (has no administration in time range)   methocarbamoL tablet 500 mg (has no administration in time range)   senna-docusate 8.6-50 mg per tablet 1 tablet (has no administration in time range)   oxyCODONE immediate release tablet 5 mg (has no administration in time  range)   oxyCODONE immediate release tablet 10 mg (has no administration in time range)   morphine injection 4 mg (4 mg Intravenous Given 8/27/24 1244)   acetaminophen tablet 1,000 mg (1,000 mg Oral Given 8/27/24 1225)     Medical Decision Making  Unfortunately, this geriatric patient has experienced what was likely a non-syncopal fall.  Reasons for falls in the elderly can include an acute illness (eg, fever/infection, dehydration, arrhythmia), underlying chronic illness, cognitive impairment, age-related changes that affect postural control/proprioception and vestibular system dysfunction, visual impairment, arthritis, a new medication and/or polypharmacy particularly the use of psychotropic medications, an environmental stress (eg, unfamiliar surrounding), improper footwear, or an unsafe walking surface (rugs, uneven pavement).    At this point in time, I think the most likely reason for this patient's fall is unsafe environment.    After the patient's fall, she has pain in the following areas: R groin  Based on my clinical assessment, I would like to rule out the following injuries:  hip/pelvic fracture.    The fall did not involve a head injury.    The fall did not involve a prolonged down time which would not raise the concern for rhabdomyolysis.   The ED work-up will include lab testing and will include imaging studies.      After ED work-up, the patient will be assessed to ensure gait stability.    I will consider involvement of the ED SW to assess for home safety, need for assist devices to improve safe ambulation, and/or home health involvement for fall/home assessment.    This patient will likely require admission to the hospital for further management.      Amount and/or Complexity of Data Reviewed  External Data Reviewed: notes.  Labs: ordered. Decision-making details documented in ED Course.  Radiology: ordered and independent interpretation performed. Decision-making details documented in ED  Course.    Risk  OTC drugs.  Prescription drug management.  Decision regarding hospitalization.               ED Course as of 08/27/24 1811 Tue Aug 27, 2024   1640 X-ray consistent with acute fracture.  Patient and  updated.  Orthopedic surgery consulted.  Patient admitted to Hospital Medicine for further management. [AS]      ED Course User Index  [AS] Ludmila Licea MD                           Clinical Impression:  Final diagnoses:  [T14.90XA] Injury  [W19.XXXA] Fall  [S72.001A] Closed fracture of right hip, initial encounter (Primary)  [S72.009A] Hip fracture          ED Disposition Condition    Admit Stable                Ludmila Licea MD  08/27/24 1812

## 2024-08-27 NOTE — HPI
By Dr. Beverly Carvajal MD    69 y.o.female with history of TIA, recurrent UTIs, mixed incontinence, osteoporosis and followed by Endocrine as outpatient on on [facility] ([facility]) for treatment of her osteoporosis and kidney stones presented to the [facility] ER with complaint of right hip pain. Patient states pain is sharp/stabbing in the right groin and is aggravated by any weight bearing and any movement . She reports onset of symptoms was earlier today after trip and fall on her patio resulting in her landing on her right hip. Patient reports that she was outside doing yard work and she tripped the garden hose and lost her balance and went down and tried to catch herself with her arms as she uses braces on her arms but ended up striking her right elbow on the hard concrete as well as her right hip. Patient states she had immediate 8/10 pain to right groin area and could not bear weight due to pain in right hip.  helped her up an able to get her in car and they drove to ED to get evaluated. Patient denies head trauma. Patient denies to loss of consciousness, denies syncope, denies chest pain, denies dizziness prior or after fall. X-ray obtained in ER revealed right femoral neck hip fracture. Orthopedics and [facility] medicine service consulted and patient to be admitted to the hospital to the [facility] service.   Prior to admission patient's functional mobility was independent with no assistive device for home and community distances and very active at baseline. Patient does not have history of gait or balance problems. Patient does not have history of previous falls or fractures. Patient does not have previous cardiac history such as MI or CAD. Patient does have previous history of TIA. Patient does not have previous history of compensated heart failure. Patient does not have history of diabetes. Patient does not have history of advanced kidney disease with creatinine > 2. Patient currently lives with her   who is at bedside in ED.   In ED, patient states she received IV Morphine for pain so now her right hip pain is 5/10. Labs reviewed. Hgb libby at 13.2. Platelets normal at 158,000. CMP normal. Vital signs stable. Reviewed home medications with patient and updated her home med list in EPIC and reconciled her home medications. Patient stated she took her morning medications today.

## 2024-08-27 NOTE — SUBJECTIVE & OBJECTIVE
Past Medical History:   Diagnosis Date    Age-related osteoporosis with current pathological fracture     Aortic calcification 04/21/2023    Seen on X-ray chest 04/03/2020      [place]-Walker grade 1 cystocele 03/07/2024    Constipation - functional     Encounter for blood transfusion     History of COVID-19 10/27/2021    Kidney calculi     Melanoma     Mitral valve prolapse     Mixed stress and urge urinary incontinence 03/07/2024    Osteoporosis, unspecified     Primary insomnia 01/20/2016    Stroke     tia    Tenosynovitis, de Quervain 10/06/2021       Past Surgical History:   Procedure Laterality Date    APPENDECTOMY      COLON SURGERY      COLONOSCOPY      COLONOSCOPY N/A 12/15/2015    Procedure: COLONOSCOPY;  Surgeon: ADALBERTO Liao MD;  Location: [facility] (4TH FLR);  Service: Endoscopy;  Laterality: N/A;    CYSTOSCOPY WITH CYSTOMETROGRAPHY N/A 3/7/2024    Procedure: CYSTOSCOPY, WITH CYSTOMETROGRAM;  Surgeon: Jabari Hager MD;  Location: [facility];  Service: Urology;  Laterality: N/A;    CYSTOSCOPY WITH URODYNAMIC TESTING N/A 3/7/2024    Procedure: CYSTOSCOPY, WITH URODYNAMIC TESTING;  Surgeon: Jabari Hager MD;  [facility];  Service: Urology;  Laterality: N/A;    DE QUERVAIN'S RELEASE Right 10/6/2021    Procedure: RELEASE, HAND, FOR DEQUERVAIN'S TENOSYNOVITIS - right;  Surgeon: Aric Leonard MD;  Location: [facility];  Service: Orthopedics;  Laterality: Right;    ELECTROMYOGRAPHY N/A 3/7/2024    Procedure: EMG (ELECTROMYOGRAPHY);  Surgeon: Jabari Hager MD;  [facility];  Service: Urology;  Laterality: N/A;    ESOPHAGOGASTRODUODENOSCOPY N/A 4/8/2020    Procedure: EGD (ESOPHAGOGASTRODUODENOSCOPY);  Surgeon: Jonathan Roth MD;  Location: [facility] (2ND FLR);  Service: Endoscopy;  Laterality: N/A;  check stat hct before     labs will be drawn prior-GT    HYSTERECTOMY      INTUSSUSCEPTION REPAIR      LASIK      LEFT COLECTOMY      melanoma on face removal      PARTIAL HYSTERECTOMY       UROFLOWMETRY N/A 3/7/2024    Procedure: UROFLOWMETRY;  Surgeon: Jabari Hager MD;  Location: [facility] OR;  Service: Urology;  Laterality: N/A;    VOIDING URETHROCYSTOGRAPHY N/A 3/7/2024    Procedure: CYSTOURETHROGRAM, VOIDING;  Surgeon: Jabari Hager MD;  Location: [facility] OR;  Service: Urology;  Laterality: N/A;       Review of patient's allergies indicates:   Allergen Reactions    Orange flavor      Severe headache, nausea    Iodine and iodide containing products     Shellfish containing products        No current facility-administered medications on file prior to encounter.     Current Outpatient Medications on File Prior to Encounter   Medication Sig    ascorbic acid, vitamin C, (VITAMIN C) 500 MG tablet Take 500 mg by mouth once daily.    CALCIUM CARBONATE/VITAMIN D3 (VITAMIN D-3 ORAL) Take 1 tablet by mouth Daily.    estradioL (ESTRACE) 0.01 % (0.1 mg/gram) vaginal cream Place 1 g vaginally every 7 days.    fish oil-omega-3 fatty acids 300-1,000 mg capsule Take 2 g by mouth once daily.    magnesium 30 mg Tab Take 1 tablet by mouth once daily.     polyethylene glycol (MIRALAX) 17 gram/dose powder Take 17 g by mouth once daily.    zolpidem (AMBIEN) 5 MG Tab Take 1 tablet (5 mg total) by mouth nightly as needed (insomnia).    nystatin (MYCOSTATIN) cream Apply topically 2 (two) times daily. for 7 days    [DISCONTINUED] conjugated estrogens (PREMARIN) vaginal cream Place vaginally once daily.     Family History       Problem Relation (Age of Onset)    Autism Son    Diabetes Father, Brother, Brother    Heart disease Brother    Hepatitis Mother    Hyperlipidemia Brother, Brother    Hypertension Brother, Brother    Parkinsonism Brother          Tobacco Use    Smoking status: Never    Smokeless tobacco: Never   Substance and Sexual Activity    Alcohol use: Never    Drug use: Never    Sexual activity: Not Currently     Review of Systems   Constitutional:  Negative for chills and fever.   HENT:  Negative for congestion  and sore throat.    Eyes:  Negative for visual disturbance.   Respiratory:  Negative for cough and shortness of breath.    Cardiovascular:  Negative for chest pain and leg swelling.   Gastrointestinal:  Negative for abdominal pain, nausea and vomiting.   Endocrine: Negative for polydipsia and polyuria.   Genitourinary:  Negative for dysuria and hematuria.   Musculoskeletal:  Positive for arthralgias (Right hip) and myalgias (Right groin).   Skin:  Positive for wound (Bruising to right elbow). Negative for rash.   Allergic/Immunologic: Positive for food allergies (Shellfish). Negative for immunocompromised state.   Neurological:  Negative for dizziness, syncope, light-headedness and headaches.   Hematological:  Negative for adenopathy. Does not bruise/bleed easily.   Psychiatric/Behavioral:  Negative for agitation and confusion.      Objective:     Vital Signs (Most Recent):  Temp: 97.9 °F (36.6 °C) (08/27/24 1116)  Pulse: 69 (08/27/24 1432)  Resp: 19 (08/27/24 1432)  BP: 148/69 (08/27/24 1432)  SpO2: 97 % (08/27/24 1432) on room air Vital Signs (24h Range):  Temp:  [97.9 °F (36.6 °C)-98.5 °F (36.9 °C)] 97.9 °F (36.6 °C)  Pulse:  [68-82] 69  Resp:  [19-20] 19  SpO2:  [96 %-98 %] 97 %  BP: (148-176)/(69-86) 148/69     Weight: 64 kg (141 lb 1.5 oz)  Body mass index is 19.14 kg/m².     Physical Exam  Vitals reviewed.   Constitutional:       General: She is awake. She is not in acute distress.     Appearance: Normal appearance. She is underweight. She is not ill-appearing.      Comments: Patient sitting up in stretcher in ED and  at bedside and upset about breaking her hip. Patient appears comfortable and no tin any distress or obvious pain.    HENT:      Head: Normocephalic and atraumatic.      Nose: Nose normal.   Eyes:      General: No scleral icterus.     Conjunctiva/sclera: Conjunctivae normal.   Neck:      Vascular: No JVD.   Cardiovascular:      Rate and Rhythm: Normal rate and regular rhythm.      Pulses:            Dorsalis pedis pulses are 2+ on the right side and 2+ on the left side.        Posterior tibial pulses are 2+ on the right side and 2+ on the left side.      Heart sounds: Normal heart sounds. No murmur heard.  Pulmonary:      Effort: Pulmonary effort is normal. No accessory muscle usage or respiratory distress.      Breath sounds: Normal breath sounds. No wheezing or rales.   Abdominal:      General: Abdomen is flat. Bowel sounds are normal. There is no distension.      Palpations: Abdomen is soft.      Tenderness: There is no abdominal tenderness.   Musculoskeletal:         General: Tenderness (Right groin on palpation) present. No deformity.      Cervical back: Neck supple.      Right lower leg: No edema.      Left lower leg: No edema.   Skin:     General: Skin is warm.      Coloration: Skin is not pale.      Findings: Bruising (Right elbow) present. No erythema.   Neurological:      General: No focal deficit present.      Mental Status: She is alert and oriented to person, place, and time.   Psychiatric:         Mood and Affect: Mood normal.         Behavior: Behavior normal. Behavior is cooperative.         Thought Content: Thought content normal.         Judgment: Judgment normal.                Significant Labs:   CBC:   Recent Labs   Lab 08/27/24  1245   WBC 11.27   HGB 13.2   HCT 40.4        CMP:   Recent Labs   Lab 08/27/24  1245      K 3.7      CO2 26      BUN 19   CREATININE 0.7   CALCIUM 9.6   PROT 7.5   ALBUMIN 4.5   BILITOT 0.8   ALKPHOS 106   AST 24   ALT 20   ANIONGAP 10       Significant Imaging: I have reviewed all pertinent imaging results/findings within the past 24 hours.

## 2024-08-27 NOTE — H&P
[facility] - Emergency Dept  [facility] Medicine  History & Physical    Patient Name: Martha Mcfarlane  MRN: [id number]  Patient Class: IP- Inpatient  Admission Date: 8/27/2024  Attending Physician: Beverly Carvajal MD  Primary Care Provider: Nas Mcneill MD         Patient information was obtained from patient, spouse/SO, past medical records, and ER records.     Subjective:     Principal Problem:Closed displaced fracture of right femoral neck    Chief Complaint:   Chief Complaint   Patient presents with    Fall     Fall today landing on R hip reports pain at groin denies blood thinners or head injury        HPI: 69 y.o.female with history of TIA, recurrent UTIs, mixed incontinence, osteoporosis and followed by Endocrine as outpatient on on Evenity (Romosozunab) for treatment of her osteoporosis and kidney stones presented to the [facility] ER with complaint of right hip pain. Patient states pain is sharp/stabbing in the right groin and is aggravated by any weight bearing and any movement . She reports onset of symptoms was earlier today after trip and fall on her patio resulting in her landing on her right hip. Patient reports that she was outside doing yard work and she tripped the garden hose and lost her balance and went down and tried to catch herself with her arms as she uses braces on her arms but ended up striking her right elbow on the hard concrete as well as her right hip. Patient states she had immediate 8/10 pain to right groin area and could not bear weight due to pain in right hip.  helped her up an able to get her in car and they drove to ED to get evaluated. Patient denies head trauma. Patient denies to loss of consciousness, denies syncope, denies chest pain, denies dizziness prior or after fall. X-ray obtained in ER revealed right femoral neck hip fracture. Orthopedics and [facility] medicine service consulted and patient to be admitted to the hospital to the [facility] service.   Prior to  admission patient's functional mobility was independent with no assistive device for home and community distances and very active at baseline. Patient does not have history of gait or balance problems. Patient does not have history of previous falls or fractures. Patient does not have previous cardiac history such as MI or CAD. Patient does have previous history of TIA. Patient does not have previous history of compensated heart failure. Patient does not have history of diabetes. Patient does not have history of advanced kidney disease with creatinine > 2. Patient currently lives with her  who is at bedside in ED.   In ED, patient states she received IV Morphine for pain so now her right hip pain is 5/10. Labs reviewed. Hgb libby at 13.2. Platelets normal at 158,000. CMP normal. Vital signs stable. Reviewed home medications with patient and updated her home med list in [facility] and reconciled her home medications. Patient stated she took her morning medications today.       Past Medical History:   Diagnosis Date    Age-related osteoporosis with current pathological fracture     Aortic calcification 04/21/2023    Seen on X-ray chest 04/03/2020      [place]-Walker grade 1 cystocele 03/07/2024    Constipation - functional     Encounter for blood transfusion     History of COVID-19 10/27/2021    Kidney calculi     Melanoma     Mitral valve prolapse     Mixed stress and urge urinary incontinence 03/07/2024    Osteoporosis, unspecified     Primary insomnia 01/20/2016    Stroke     tia    Tenosynovitis, de Quervain 10/06/2021       Past Surgical History:   Procedure Laterality Date    APPENDECTOMY      COLON SURGERY      COLONOSCOPY      COLONOSCOPY N/A 12/15/2015    Procedure: COLONOSCOPY;  Surgeon: ADALBERTO Liao MD;  Location: [address];  Service: Endoscopy;  Laterality: N/A;    CYSTOSCOPY WITH CYSTOMETROGRAPHY N/A 3/7/2024    Procedure: CYSTOSCOPY, WITH CYSTOMETROGRAM;  Surgeon: Jabari Hager MD;   Location: [facility];  Service: Urology;  Laterality: N/A;    CYSTOSCOPY WITH URODYNAMIC TESTING N/A 3/7/2024    Procedure: CYSTOSCOPY, WITH URODYNAMIC TESTING;  Surgeon: Jabari Hager MD;  Location: [facility];  Service: Urology;  Laterality: N/A;    DE QUERVAIN'S RELEASE Right 10/6/2021    Procedure: RELEASE, HAND, FOR DEQUERVAIN'S TENOSYNOVITIS - right;  Surgeon: Aric Leonard MD;  Location: [facility];  Service: Orthopedics;  Laterality: Right;    ELECTROMYOGRAPHY N/A 3/7/2024    Procedure: EMG (ELECTROMYOGRAPHY);  Surgeon: Jabari Hager MD;  Location: [facility];  Service: Urology;  Laterality: N/A;    ESOPHAGOGASTRODUODENOSCOPY N/A 4/8/2020    Procedure: EGD (ESOPHAGOGASTRODUODENOSCOPY);  Surgeon: Jonathan Roth MD;  Location: [address]);  Service: Endoscopy;  Laterality: N/A;  check stat hct before     labs will be drawn prior-GT    HYSTERECTOMY      INTUSSUSCEPTION REPAIR      LASIK      LEFT COLECTOMY      melanoma on face removal      PARTIAL HYSTERECTOMY      UROFLOWMETRY N/A 3/7/2024    Procedure: UROFLOWMETRY;  Surgeon: Jabari Hager MD;  Location: [facility];  Service: Urology;  Laterality: N/A;    VOIDING URETHROCYSTOGRAPHY N/A 3/7/2024    Procedure: CYSTOURETHROGRAM, VOIDING;  Surgeon: Jabari Hager MD;  Location: [facility];  Service: Urology;  Laterality: N/A;       Review of patient's allergies indicates:   Allergen Reactions    Orange flavor      Severe headache, nausea    Iodine and iodide containing products     Shellfish containing products        No current facility-administered medications on file prior to encounter.     Current Outpatient Medications on File Prior to Encounter   Medication Sig    ascorbic acid, vitamin C, (VITAMIN C) 500 MG tablet Take 500 mg by mouth once daily.    CALCIUM CARBONATE/VITAMIN D3 (VITAMIN D-3 ORAL) Take 1 tablet by mouth Daily.    estradioL (ESTRACE) 0.01 % (0.1 mg/gram) vaginal cream Place 1 g vaginally every 7 days.    fish oil-omega-3 fatty  acids 300-1,000 mg capsule Take 2 g by mouth once daily.    magnesium 30 mg Tab Take 1 tablet by mouth once daily.     polyethylene glycol (MIRALAX) 17 gram/dose powder Take 17 g by mouth once daily.    zolpidem (AMBIEN) 5 MG Tab Take 1 tablet (5 mg total) by mouth nightly as needed (insomnia).    nystatin (MYCOSTATIN) cream Apply topically 2 (two) times daily. for 7 days    [DISCONTINUED] conjugated estrogens (PREMARIN) vaginal cream Place vaginally once daily.     Family History       Problem Relation (Age of Onset)    Autism Son    Diabetes Father, Brother, Brother    Heart disease Brother    Hepatitis Mother    Hyperlipidemia Brother, Brother    Hypertension Brother, Brother    Parkinsonism Brother          Tobacco Use    Smoking status: Never    Smokeless tobacco: Never   Substance and Sexual Activity    Alcohol use: Never    Drug use: Never    Sexual activity: Not Currently     Review of Systems   Constitutional:  Negative for chills and fever.   HENT:  Negative for congestion and sore throat.    Eyes:  Negative for visual disturbance.   Respiratory:  Negative for cough and shortness of breath.    Cardiovascular:  Negative for chest pain and leg swelling.   Gastrointestinal:  Negative for abdominal pain, nausea and vomiting.   Endocrine: Negative for polydipsia and polyuria.   Genitourinary:  Negative for dysuria and hematuria.   Musculoskeletal:  Positive for arthralgias (Right hip) and myalgias (Right groin).   Skin:  Positive for wound (Bruising to right elbow). Negative for rash.   Allergic/Immunologic: Positive for food allergies (Shellfish). Negative for immunocompromised state.   Neurological:  Negative for dizziness, syncope, light-headedness and headaches.   Hematological:  Negative for adenopathy. Does not bruise/bleed easily.   Psychiatric/Behavioral:  Negative for agitation and confusion.      Objective:     Vital Signs (Most Recent):  Temp: 97.9 °F (36.6 °C) (08/27/24 1116)  Pulse: 69 (08/27/24  1432)  Resp: 19 (08/27/24 1432)  BP: 148/69 (08/27/24 1432)  SpO2: 97 % (08/27/24 1432) on room air Vital Signs (24h Range):  Temp:  [97.9 °F (36.6 °C)-98.5 °F (36.9 °C)] 97.9 °F (36.6 °C)  Pulse:  [68-82] 69  Resp:  [19-20] 19  SpO2:  [96 %-98 %] 97 %  BP: (148-176)/(69-86) 148/69     Weight: 64 kg (141 lb 1.5 oz)  Body mass index is 19.14 kg/m².     Physical Exam  Vitals reviewed.   Constitutional:       General: She is awake. She is not in acute distress.     Appearance: Normal appearance. She is underweight. She is not ill-appearing.      Comments: Patient sitting up in stretcher in ED and  at bedside and upset about breaking her hip. Patient appears comfortable and no tin any distress or obvious pain.    HENT:      Head: Normocephalic and atraumatic.      Nose: Nose normal.   Eyes:      General: No scleral icterus.     Conjunctiva/sclera: Conjunctivae normal.   Neck:      Vascular: No JVD.   Cardiovascular:      Rate and Rhythm: Normal rate and regular rhythm.      Pulses:           Dorsalis pedis pulses are 2+ on the right side and 2+ on the left side.        Posterior tibial pulses are 2+ on the right side and 2+ on the left side.      Heart sounds: Normal heart sounds. No murmur heard.  Pulmonary:      Effort: Pulmonary effort is normal. No accessory muscle usage or respiratory distress.      Breath sounds: Normal breath sounds. No wheezing or rales.   Abdominal:      General: Abdomen is flat. Bowel sounds are normal. There is no distension.      Palpations: Abdomen is soft.      Tenderness: There is no abdominal tenderness.   Musculoskeletal:         General: Tenderness (Right groin on palpation) present. No deformity.      Cervical back: Neck supple.      Right lower leg: No edema.      Left lower leg: No edema.   Skin:     General: Skin is warm.      Coloration: Skin is not pale.      Findings: Bruising (Right elbow) present. No erythema.   Neurological:      General: No focal deficit present.       Mental Status: She is alert and oriented to person, place, and time.   Psychiatric:         Mood and Affect: Mood normal.         Behavior: Behavior normal. Behavior is cooperative.         Thought Content: Thought content normal.         Judgment: Judgment normal.                Significant Labs:   CBC:   Recent Labs   Lab 08/27/24  1245   WBC 11.27   HGB 13.2   HCT 40.4        CMP:   Recent Labs   Lab 08/27/24  1245      K 3.7      CO2 26      BUN 19   CREATININE 0.7   CALCIUM 9.6   PROT 7.5   ALBUMIN 4.5   BILITOT 0.8   ALKPHOS 106   AST 24   ALT 20   ANIONGAP 10       Significant Imaging: I have reviewed all pertinent imaging results/findings within the past 24 hours.  Assessment/Plan:     * Closed displaced fracture of right femoral neck  Patient admitted after trip and fall on her garden hose onto her patio striking her right hip and X-rays revealed closed subcapital right femur fracture. Orthopedics consulted in ED and ordered CT scan of pelvis to better evaluate fracture for operative planning.   Orthopedic surgery consulted and plan to take patient to the OR tomorrow for surgical repair of hip fracture so will make patient NPO after midnight for surgery except medications.   Preoperative assessment: Routine labs, CXR and EKG reviewed. Patient is at low risk for perioperative cardiopulmonary complications. Patient with no active cardiac issues. Patient with good functional mets > 4 and with no significant cardiac clinical risk factors for intermediate risk surgery. Recommend to proceed to surgery as planned with no additional non-invasive cardiac testing required.  Pain control as per Hip Fracture Pathway with multimodal pain regimen with scheduled Tylenol 1000 mg po every 8 hours, Robaxin 500 mg po 4 times daily and Lyrica 75 mg po nightly and oral Oxycodone IR 5-10 mg po every 4 hours prn for breakthrough pain.   DVT prophylaxis pre-op with TEDs/SCDs.   Plan to monitor daily  electrolytes and H/H post-op.   Start Apixiban 2.5 mg po BID post-op after surgery for DVT prophylaxis and will need for a total of 30 days after hip fracture surgery.   Will consult PT/OT post-op for gait training and strengthening and restoration of ADLs.   Will consult  and case management to assist with discharge planning for this patient after surgery.    For now patient to be on bedrest and non weight bearing to right lower extremity.     Age-related osteoporosis with current pathological fracture  - Patient with known osteoporosis and current right hip fracture related to osteoporosis.   - Patient followed by Bob Solorio in Endocrine clinic for management of her osteoporosis and on Calcium and Vitamin D supplementation and recently started on Evenity injections for treatment of her osteoporosis. Per Dr. Nails previous osteoporosis treatment included:  Reclast 5 mg   Prolia 60 mg due to severe constipation  Tried alendronate - last dose was 2014  Restarted alendronate 7/2017 but stopped it September 2017 due to blood pressure issues.     Mixed stress and urge urinary incontinence  Patient followed by Urology and patient reports since last cystoscopy and treatment by Urology no further issues with UTIs or incontinence.       Primary insomnia  Chronic condition. Patient takes Ambien 5 mg po nightly to help to treat as needed and will continue in hospital.         VTE Risk Mitigation (From admission, onward)           Ordered     IP VTE HIGH RISK PATIENT  Once         08/27/24 1707     Place sequential compression device  Until discontinued         08/27/24 1707     Place BRIGHT hose  Until discontinued         08/27/24 1707                                    Beverly Carvajal MD  Department of Hospital Medicine  [facility] - Emergency Dept

## 2024-08-27 NOTE — FIRST PROVIDER EVALUATION
Emergency Department TeleTriage Encounter Note      CHIEF COMPLAINT    Chief Complaint   Patient presents with    Fall     Fall today landing on R hip reports pain at groin denies blood thinners or head injury       VITAL SIGNS   Initial Vitals [08/27/24 1039]   BP Pulse Resp Temp SpO2   (!) 176/86 80 20 98.5 °F (36.9 °C) 97 %      MAP       --            ALLERGIES    Review of patient's allergies indicates:   Allergen Reactions    Orange flavor      Severe headache, nausea    Iodine and iodide containing products     Shellfish containing products        PROVIDER TRIAGE NOTE  Verbal consent for the teletriage evaluation was given by the patient at the start of the evaluation.  All efforts will be made to maintain patient's privacy during the evaluation.      This is a teletriage evaluation of a 69 y.o. female presenting to the ED with c/o trip and fall this AM with injury to right hip/pelvis area.  Denies head injury, LOC, neck injury. Limited physical exam via telehealth: The patient is awake, alert, answering questions appropriately and is not in respiratory distress.  As the Teletriage provider, I performed an initial assessment and ordered appropriate labs and imaging studies, if any, to facilitate the patient's care once placed in the ED. Once a room is available, care and a full evaluation will be completed by an alternate ED provider.  Any additional orders and the final disposition will be determined by that provider.  All imaging and labs will not be followed-up by the Teletriage Team, including myself.          ORDERS  Labs Reviewed - No data to display    ED Orders (720h ago, onward)      Start Ordered     Status Ordering Provider    08/27/24 1118 08/27/24 1117  X-Ray Hip 2 or 3 views Right with Pelvis when performed  1 time imaging         Ordered EKATERINA DAVIS              Virtual Visit Note: The provider triage portion of this emergency department evaluation and documentation was performed via  [employer], a HIPAA-compliant telemedicine application, in concert with a tele-presenter in the room. A face to face patient evaluation with one of my colleagues will occur once the patient is placed in an emergency department room.      DISCLAIMER: This note was prepared with Diffon voice recognition transcription software. Garbled syntax, mangled pronouns, and other bizarre constructions may be attributed to that software system.

## 2024-08-28 ENCOUNTER — ANESTHESIA (OUTPATIENT)
Dept: SURGERY | Facility: HOSPITAL | Age: 69
End: 2024-08-28
Payer: MEDICARE

## 2024-08-28 LAB
ANION GAP SERPL CALC-SCNC: 7 MMOL/L (ref 8–16)
BASOPHILS # BLD AUTO: 0.03 K/UL (ref 0–0.2)
BASOPHILS NFR BLD: 0.5 % (ref 0–1.9)
BUN SERPL-MCNC: 16 MG/DL (ref 8–23)
CALCIUM SERPL-MCNC: 8.8 MG/DL (ref 8.7–10.5)
CHLORIDE SERPL-SCNC: 104 MMOL/L (ref 95–110)
CO2 SERPL-SCNC: 26 MMOL/L (ref 23–29)
CREAT SERPL-MCNC: 0.7 MG/DL (ref 0.5–1.4)
DIFFERENTIAL METHOD BLD: ABNORMAL
EOSINOPHIL # BLD AUTO: 0.1 K/UL (ref 0–0.5)
EOSINOPHIL NFR BLD: 0.9 % (ref 0–8)
ERYTHROCYTE [DISTWIDTH] IN BLOOD BY AUTOMATED COUNT: 12.7 % (ref 11.5–14.5)
EST. GFR  (NO RACE VARIABLE): >60 ML/MIN/1.73 M^2
GLUCOSE SERPL-MCNC: 96 MG/DL (ref 70–110)
HCT VFR BLD AUTO: 35.8 % (ref 37–48.5)
HGB BLD-MCNC: 11.5 G/DL (ref 12–16)
IMM GRANULOCYTES # BLD AUTO: 0.01 K/UL (ref 0–0.04)
IMM GRANULOCYTES NFR BLD AUTO: 0.2 % (ref 0–0.5)
LYMPHOCYTES # BLD AUTO: 0.8 K/UL (ref 1–4.8)
LYMPHOCYTES NFR BLD: 14.1 % (ref 18–48)
MAGNESIUM SERPL-MCNC: 2.1 MG/DL (ref 1.6–2.6)
MCH RBC QN AUTO: 28.8 PG (ref 27–31)
MCHC RBC AUTO-ENTMCNC: 32.1 G/DL (ref 32–36)
MCV RBC AUTO: 90 FL (ref 82–98)
MONOCYTES # BLD AUTO: 0.5 K/UL (ref 0.3–1)
MONOCYTES NFR BLD: 8.9 % (ref 4–15)
NEUTROPHILS # BLD AUTO: 4.2 K/UL (ref 1.8–7.7)
NEUTROPHILS NFR BLD: 75.4 % (ref 38–73)
NRBC BLD-RTO: 0 /100 WBC
OHS QRS DURATION: 100 MS
OHS QTC CALCULATION: 446 MS
PHOSPHATE SERPL-MCNC: 3.5 MG/DL (ref 2.7–4.5)
PLATELET # BLD AUTO: 145 K/UL (ref 150–450)
PMV BLD AUTO: 10.2 FL (ref 9.2–12.9)
POTASSIUM SERPL-SCNC: 3.9 MMOL/L (ref 3.5–5.1)
RBC # BLD AUTO: 4 M/UL (ref 4–5.4)
SODIUM SERPL-SCNC: 137 MMOL/L (ref 136–145)
WBC # BLD AUTO: 5.53 K/UL (ref 3.9–12.7)

## 2024-08-28 PROCEDURE — 86920 COMPATIBILITY TEST SPIN: CPT

## 2024-08-28 PROCEDURE — 37000008 HC ANESTHESIA 1ST 15 MINUTES: Performed by: ORTHOPAEDIC SURGERY

## 2024-08-28 PROCEDURE — 36000710: Performed by: ORTHOPAEDIC SURGERY

## 2024-08-28 PROCEDURE — 37000009 HC ANESTHESIA EA ADD 15 MINS: Performed by: ORTHOPAEDIC SURGERY

## 2024-08-28 PROCEDURE — 63600175 PHARM REV CODE 636 W HCPCS

## 2024-08-28 PROCEDURE — 27201423 OPTIME MED/SURG SUP & DEVICES STERILE SUPPLY: Performed by: ORTHOPAEDIC SURGERY

## 2024-08-28 PROCEDURE — 63600175 PHARM REV CODE 636 W HCPCS: Performed by: STUDENT IN AN ORGANIZED HEALTH CARE EDUCATION/TRAINING PROGRAM

## 2024-08-28 PROCEDURE — 71000015 HC POSTOP RECOV 1ST HR: Performed by: ORTHOPAEDIC SURGERY

## 2024-08-28 PROCEDURE — C1889 IMPLANT/INSERT DEVICE, NOC: HCPCS | Performed by: ORTHOPAEDIC SURGERY

## 2024-08-28 PROCEDURE — 84100 ASSAY OF PHOSPHORUS: CPT | Performed by: INTERNAL MEDICINE

## 2024-08-28 PROCEDURE — 25000003 PHARM REV CODE 250: Performed by: INTERNAL MEDICINE

## 2024-08-28 PROCEDURE — 21400001 HC TELEMETRY ROOM

## 2024-08-28 PROCEDURE — 25000003 PHARM REV CODE 250: Performed by: NURSE ANESTHETIST, CERTIFIED REGISTERED

## 2024-08-28 PROCEDURE — 36000711: Performed by: ORTHOPAEDIC SURGERY

## 2024-08-28 PROCEDURE — 25000003 PHARM REV CODE 250

## 2024-08-28 PROCEDURE — 71000033 HC RECOVERY, INTIAL HOUR: Performed by: ORTHOPAEDIC SURGERY

## 2024-08-28 PROCEDURE — 99223 1ST HOSP IP/OBS HIGH 75: CPT | Mod: ,,, | Performed by: ORTHOPAEDIC SURGERY

## 2024-08-28 PROCEDURE — C1776 JOINT DEVICE (IMPLANTABLE): HCPCS | Performed by: ORTHOPAEDIC SURGERY

## 2024-08-28 PROCEDURE — 71000016 HC POSTOP RECOV ADDL HR: Performed by: ORTHOPAEDIC SURGERY

## 2024-08-28 PROCEDURE — 88311 DECALCIFY TISSUE: CPT | Performed by: PATHOLOGY

## 2024-08-28 PROCEDURE — 64448 NJX AA&/STRD FEM NRV NFS IMG: CPT

## 2024-08-28 PROCEDURE — 0SR9029 REPLACEMENT OF RIGHT HIP JOINT WITH METAL ON POLYETHYLENE SYNTHETIC SUBSTITUTE, CEMENTED, OPEN APPROACH: ICD-10-PCS | Performed by: ORTHOPAEDIC SURGERY

## 2024-08-28 PROCEDURE — 88305 TISSUE EXAM BY PATHOLOGIST: CPT | Performed by: PATHOLOGY

## 2024-08-28 PROCEDURE — 63600175 PHARM REV CODE 636 W HCPCS: Performed by: ORTHOPAEDIC SURGERY

## 2024-08-28 PROCEDURE — 80048 BASIC METABOLIC PNL TOTAL CA: CPT | Performed by: INTERNAL MEDICINE

## 2024-08-28 PROCEDURE — 94761 N-INVAS EAR/PLS OXIMETRY MLT: CPT

## 2024-08-28 PROCEDURE — 83735 ASSAY OF MAGNESIUM: CPT | Performed by: INTERNAL MEDICINE

## 2024-08-28 PROCEDURE — 99900035 HC TECH TIME PER 15 MIN (STAT)

## 2024-08-28 PROCEDURE — 63600175 PHARM REV CODE 636 W HCPCS: Performed by: ANESTHESIOLOGY

## 2024-08-28 PROCEDURE — 36415 COLL VENOUS BLD VENIPUNCTURE: CPT | Performed by: INTERNAL MEDICINE

## 2024-08-28 PROCEDURE — 63600175 PHARM REV CODE 636 W HCPCS: Performed by: NURSE ANESTHETIST, CERTIFIED REGISTERED

## 2024-08-28 PROCEDURE — 27000221 HC OXYGEN, UP TO 24 HOURS

## 2024-08-28 PROCEDURE — C1713 ANCHOR/SCREW BN/BN,TIS/BN: HCPCS | Performed by: ORTHOPAEDIC SURGERY

## 2024-08-28 PROCEDURE — 63600175 PHARM REV CODE 636 W HCPCS: Performed by: INTERNAL MEDICINE

## 2024-08-28 PROCEDURE — 85025 COMPLETE CBC W/AUTO DIFF WBC: CPT | Performed by: INTERNAL MEDICINE

## 2024-08-28 DEVICE — UNIVERSAL DISTAL CEMENT SPACER
Type: IMPLANTABLE DEVICE | Site: HIP | Status: FUNCTIONAL
Brand: OMNIFIT

## 2024-08-28 DEVICE — RESTORATION ADM/MDM X3 INSERT
Type: IMPLANTABLE DEVICE | Site: HIP | Status: FUNCTIONAL
Brand: RESTORATION ADM/MDM X3 INSERT

## 2024-08-28 DEVICE — UNIVERSAL CEMENT RESTRICTOR
Type: IMPLANTABLE DEVICE | Site: HIP | Status: FUNCTIONAL
Brand: RESTRICTOR

## 2024-08-28 DEVICE — LINER- CEMENTLESS
Type: IMPLANTABLE DEVICE | Site: HIP | Status: FUNCTIONAL
Brand: MDM

## 2024-08-28 DEVICE — 127 DEGREE CEMENTED HIP STEM
Type: IMPLANTABLE DEVICE | Site: HIP | Status: FUNCTIONAL
Brand: OMNIFIT

## 2024-08-28 DEVICE — CEMENT BONE SURG SMPLX P RADPQ: Type: IMPLANTABLE DEVICE | Site: HIP | Status: FUNCTIONAL

## 2024-08-28 DEVICE — IMPLANTABLE DEVICE: Type: IMPLANTABLE DEVICE | Site: HIP | Status: FUNCTIONAL

## 2024-08-28 DEVICE — TRIDENT II TRITANIUM CLUSTER 54E
Type: IMPLANTABLE DEVICE | Site: HIP | Status: FUNCTIONAL
Brand: TRIDENT II

## 2024-08-28 DEVICE — 6.5MM LOW PROFILE HEX SCREW 25MM
Type: IMPLANTABLE DEVICE | Site: HIP | Status: FUNCTIONAL
Brand: TRIDENT II

## 2024-08-28 RX ORDER — ONDANSETRON HYDROCHLORIDE 2 MG/ML
INJECTION, SOLUTION INTRAVENOUS
Status: DISCONTINUED | OUTPATIENT
Start: 2024-08-28 | End: 2024-08-28

## 2024-08-28 RX ORDER — MUPIROCIN 20 MG/G
1 OINTMENT TOPICAL 2 TIMES DAILY
Status: DISCONTINUED | OUTPATIENT
Start: 2024-08-28 | End: 2024-08-30 | Stop reason: HOSPADM

## 2024-08-28 RX ORDER — ACETAMINOPHEN 500 MG
1000 TABLET ORAL EVERY 6 HOURS
Status: DISCONTINUED | OUTPATIENT
Start: 2024-08-28 | End: 2024-08-28 | Stop reason: HOSPADM

## 2024-08-28 RX ORDER — ROCURONIUM BROMIDE 10 MG/ML
INJECTION, SOLUTION INTRAVENOUS
Status: DISCONTINUED | OUTPATIENT
Start: 2024-08-28 | End: 2024-08-28

## 2024-08-28 RX ORDER — FENTANYL CITRATE 50 UG/ML
25 INJECTION, SOLUTION INTRAMUSCULAR; INTRAVENOUS EVERY 5 MIN PRN
Status: DISCONTINUED | OUTPATIENT
Start: 2024-08-28 | End: 2024-08-28 | Stop reason: HOSPADM

## 2024-08-28 RX ORDER — MIDAZOLAM HYDROCHLORIDE 1 MG/ML
.5-4 INJECTION, SOLUTION INTRAMUSCULAR; INTRAVENOUS
Status: DISCONTINUED | OUTPATIENT
Start: 2024-08-28 | End: 2024-08-28 | Stop reason: HOSPADM

## 2024-08-28 RX ORDER — ROPIVACAINE HYDROCHLORIDE 5 MG/ML
INJECTION, SOLUTION EPIDURAL; INFILTRATION; PERINEURAL
Status: COMPLETED | OUTPATIENT
Start: 2024-08-28 | End: 2024-08-28

## 2024-08-28 RX ORDER — LIDOCAINE HYDROCHLORIDE 20 MG/ML
INJECTION, SOLUTION EPIDURAL; INFILTRATION; INTRACAUDAL; PERINEURAL
Status: DISCONTINUED | OUTPATIENT
Start: 2024-08-28 | End: 2024-08-28

## 2024-08-28 RX ORDER — SODIUM CHLORIDE 0.9 % (FLUSH) 0.9 %
10 SYRINGE (ML) INJECTION
Status: DISCONTINUED | OUTPATIENT
Start: 2024-08-28 | End: 2024-08-28 | Stop reason: HOSPADM

## 2024-08-28 RX ORDER — GLUCAGON 1 MG
1 KIT INJECTION
Status: DISCONTINUED | OUTPATIENT
Start: 2024-08-28 | End: 2024-08-28 | Stop reason: HOSPADM

## 2024-08-28 RX ORDER — TOBRAMYCIN 1.2 G/30ML
INJECTION, POWDER, LYOPHILIZED, FOR SOLUTION INTRAVENOUS
Status: DISCONTINUED | OUTPATIENT
Start: 2024-08-28 | End: 2024-08-28 | Stop reason: HOSPADM

## 2024-08-28 RX ORDER — PHENYLEPHRINE HYDROCHLORIDE 10 MG/ML
INJECTION INTRAVENOUS
Status: DISCONTINUED | OUTPATIENT
Start: 2024-08-28 | End: 2024-08-28

## 2024-08-28 RX ORDER — FENTANYL CITRATE 50 UG/ML
INJECTION, SOLUTION INTRAMUSCULAR; INTRAVENOUS
Status: DISCONTINUED | OUTPATIENT
Start: 2024-08-28 | End: 2024-08-28

## 2024-08-28 RX ORDER — METHOCARBAMOL 500 MG/1
500 TABLET, FILM COATED ORAL EVERY 6 HOURS
Status: DISCONTINUED | OUTPATIENT
Start: 2024-08-28 | End: 2024-08-30

## 2024-08-28 RX ORDER — MUPIROCIN 20 MG/G
OINTMENT TOPICAL
Status: DISCONTINUED | OUTPATIENT
Start: 2024-08-28 | End: 2024-08-28 | Stop reason: HOSPADM

## 2024-08-28 RX ORDER — MUPIROCIN 20 MG/G
1 OINTMENT TOPICAL
Status: COMPLETED | OUTPATIENT
Start: 2024-08-28 | End: 2024-08-28

## 2024-08-28 RX ORDER — DEXAMETHASONE SODIUM PHOSPHATE 4 MG/ML
INJECTION, SOLUTION INTRA-ARTICULAR; INTRALESIONAL; INTRAMUSCULAR; INTRAVENOUS; SOFT TISSUE
Status: DISCONTINUED | OUTPATIENT
Start: 2024-08-28 | End: 2024-08-28

## 2024-08-28 RX ORDER — OXYCODONE HYDROCHLORIDE 5 MG/1
5 TABLET ORAL EVERY 4 HOURS PRN
Status: DISCONTINUED | OUTPATIENT
Start: 2024-08-28 | End: 2024-08-29

## 2024-08-28 RX ORDER — ACETAMINOPHEN 500 MG
1000 TABLET ORAL EVERY 6 HOURS
Status: DISCONTINUED | OUTPATIENT
Start: 2024-08-28 | End: 2024-08-30

## 2024-08-28 RX ORDER — OXYCODONE HYDROCHLORIDE 5 MG/1
5 TABLET ORAL
Status: DISCONTINUED | OUTPATIENT
Start: 2024-08-28 | End: 2024-08-28 | Stop reason: HOSPADM

## 2024-08-28 RX ORDER — VANCOMYCIN HYDROCHLORIDE 1 G/20ML
INJECTION, POWDER, LYOPHILIZED, FOR SOLUTION INTRAVENOUS
Status: DISCONTINUED | OUTPATIENT
Start: 2024-08-28 | End: 2024-08-28 | Stop reason: HOSPADM

## 2024-08-28 RX ORDER — ROPIVACAINE HYDROCHLORIDE 2 MG/ML
INJECTION, SOLUTION EPIDURAL; INFILTRATION; PERINEURAL CONTINUOUS
Status: DISCONTINUED | OUTPATIENT
Start: 2024-08-28 | End: 2024-08-30

## 2024-08-28 RX ORDER — FENTANYL CITRATE 50 UG/ML
25-200 INJECTION, SOLUTION INTRAMUSCULAR; INTRAVENOUS
Status: DISCONTINUED | OUTPATIENT
Start: 2024-08-28 | End: 2024-08-28 | Stop reason: HOSPADM

## 2024-08-28 RX ORDER — ONDANSETRON HYDROCHLORIDE 2 MG/ML
4 INJECTION, SOLUTION INTRAVENOUS DAILY PRN
Status: DISCONTINUED | OUTPATIENT
Start: 2024-08-28 | End: 2024-08-28 | Stop reason: HOSPADM

## 2024-08-28 RX ORDER — HALOPERIDOL 5 MG/ML
0.5 INJECTION INTRAMUSCULAR EVERY 10 MIN PRN
Status: DISCONTINUED | OUTPATIENT
Start: 2024-08-28 | End: 2024-08-28 | Stop reason: HOSPADM

## 2024-08-28 RX ORDER — PROPOFOL 10 MG/ML
VIAL (ML) INTRAVENOUS
Status: DISCONTINUED | OUTPATIENT
Start: 2024-08-28 | End: 2024-08-28

## 2024-08-28 RX ADMIN — ACETAMINOPHEN 1000 MG: 500 TABLET ORAL at 07:08

## 2024-08-28 RX ADMIN — Medication: at 06:08

## 2024-08-28 RX ADMIN — SENNOSIDES AND DOCUSATE SODIUM 1 TABLET: 50; 8.6 TABLET ORAL at 08:08

## 2024-08-28 RX ADMIN — ROCURONIUM BROMIDE 50 MG: 10 INJECTION, SOLUTION INTRAVENOUS at 02:08

## 2024-08-28 RX ADMIN — PHENYLEPHRINE HYDROCHLORIDE 200 MCG: 10 INJECTION INTRAVENOUS at 04:08

## 2024-08-28 RX ADMIN — MUPIROCIN 1 G: 20 OINTMENT TOPICAL at 11:08

## 2024-08-28 RX ADMIN — FENTANYL CITRATE 25 MCG: 50 INJECTION, SOLUTION INTRAMUSCULAR; INTRAVENOUS at 02:08

## 2024-08-28 RX ADMIN — SODIUM CHLORIDE, SODIUM GLUCONATE, SODIUM ACETATE, POTASSIUM CHLORIDE, MAGNESIUM CHLORIDE, SODIUM PHOSPHATE, DIBASIC, AND POTASSIUM PHOSPHATE: .53; .5; .37; .037; .03; .012; .00082 INJECTION, SOLUTION INTRAVENOUS at 04:08

## 2024-08-28 RX ADMIN — FENTANYL CITRATE 25 MCG: 50 INJECTION, SOLUTION INTRAMUSCULAR; INTRAVENOUS at 03:08

## 2024-08-28 RX ADMIN — OXYCODONE HYDROCHLORIDE AND ACETAMINOPHEN 500 MG: 500 TABLET ORAL at 08:08

## 2024-08-28 RX ADMIN — PHENYLEPHRINE HYDROCHLORIDE 100 MCG: 10 INJECTION INTRAVENOUS at 03:08

## 2024-08-28 RX ADMIN — OXYCODONE HYDROCHLORIDE 5 MG: 5 TABLET ORAL at 06:08

## 2024-08-28 RX ADMIN — MIDAZOLAM 1 MG: 1 INJECTION INTRAMUSCULAR; INTRAVENOUS at 12:08

## 2024-08-28 RX ADMIN — FENTANYL CITRATE 50 MCG: 50 INJECTION INTRAMUSCULAR; INTRAVENOUS at 12:08

## 2024-08-28 RX ADMIN — DEXAMETHASONE SODIUM PHOSPHATE 4 MG: 4 INJECTION, SOLUTION INTRAMUSCULAR; INTRAVENOUS at 02:08

## 2024-08-28 RX ADMIN — Medication 2 CAPSULE: at 08:08

## 2024-08-28 RX ADMIN — ONDANSETRON 4 MG: 2 INJECTION INTRAMUSCULAR; INTRAVENOUS at 04:08

## 2024-08-28 RX ADMIN — PROPOFOL 140 MG: 10 INJECTION, EMULSION INTRAVENOUS at 02:08

## 2024-08-28 RX ADMIN — FENTANYL CITRATE 25 MCG: 50 INJECTION, SOLUTION INTRAMUSCULAR; INTRAVENOUS at 06:08

## 2024-08-28 RX ADMIN — ROCURONIUM BROMIDE 10 MG: 10 INJECTION, SOLUTION INTRAVENOUS at 04:08

## 2024-08-28 RX ADMIN — CHOLECALCIFEROL TAB 25 MCG (1000 UNIT) 2000 UNITS: 25 TAB at 08:08

## 2024-08-28 RX ADMIN — PHENYLEPHRINE HYDROCHLORIDE 100 MCG: 10 INJECTION INTRAVENOUS at 04:08

## 2024-08-28 RX ADMIN — POLYETHYLENE GLYCOL 3350 17 G: 17 POWDER, FOR SOLUTION ORAL at 08:08

## 2024-08-28 RX ADMIN — ROCURONIUM BROMIDE 20 MG: 10 INJECTION, SOLUTION INTRAVENOUS at 03:08

## 2024-08-28 RX ADMIN — CEFAZOLIN 2 G: 2 INJECTION, POWDER, FOR SOLUTION INTRAMUSCULAR; INTRAVENOUS at 09:08

## 2024-08-28 RX ADMIN — SUGAMMADEX 200 MG: 100 INJECTION, SOLUTION INTRAVENOUS at 05:08

## 2024-08-28 RX ADMIN — ROPIVACAINE HYDROCHLORIDE 15 ML: 5 INJECTION EPIDURAL; INFILTRATION; PERINEURAL at 01:08

## 2024-08-28 RX ADMIN — SENNOSIDES AND DOCUSATE SODIUM 1 TABLET: 50; 8.6 TABLET ORAL at 09:08

## 2024-08-28 RX ADMIN — LIDOCAINE HYDROCHLORIDE 100 MG: 20 INJECTION, SOLUTION EPIDURAL; INFILTRATION; INTRACAUDAL; PERINEURAL at 02:08

## 2024-08-28 RX ADMIN — METHOCARBAMOL 500 MG: 500 TABLET ORAL at 07:08

## 2024-08-28 RX ADMIN — METHOCARBAMOL 500 MG: 500 TABLET ORAL at 08:08

## 2024-08-28 RX ADMIN — CEFAZOLIN 2 G: 2 INJECTION, POWDER, FOR SOLUTION INTRAMUSCULAR; INTRAVENOUS at 02:08

## 2024-08-28 RX ADMIN — APIXABAN 2.5 MG: 2.5 TABLET, FILM COATED ORAL at 09:08

## 2024-08-28 RX ADMIN — VANCOMYCIN HYDROCHLORIDE 1000 MG: 1 INJECTION, POWDER, LYOPHILIZED, FOR SOLUTION INTRAVENOUS at 11:08

## 2024-08-28 RX ADMIN — SODIUM CHLORIDE: 0.9 INJECTION, SOLUTION INTRAVENOUS at 01:08

## 2024-08-28 NOTE — BRIEF OP NOTE
[facility] - Surgery ([facility])  Brief Operative Note    SUMMARY     Surgery Date: 8/28/2024     Surgeons and Role:     * Robbin Burnham MD - Primary     * Adrián Kennedy MD - Resident - Assisting        Pre-op Diagnosis:  Closed fracture of right hip, initial encounter [S72.001A]    Post-op Diagnosis:  Post-Op Diagnosis Codes:     * Closed fracture of right hip, initial encounter [S72.001A]    Procedure(s) (LRB):  ARTHROPLASTY, HIP (Right)    Anesthesia: General/Regional    Implants:  Implant Name Type Inv. Item Serial No.  Lot No. LRB No. Used Action   PLUG BONE - [id number]  PLUG BONE  [employer] TO[employer]. [id number] Right 1 Implanted   SHELL TRIDENT II ACET E 54MM - [id number]  SHELL TRIDENT II ACET E 54MM  [employer]. [id number] Right 1 Implanted   CEMENT BONE SURG SMPLX P RADPQ - [id number]  CEMENT BONE SURG SMPLX P RADPQ  [employer]. [id number] Right 2 Implanted   SCREW TRIDENT II LP HEX 6.5X25 - [id number]  SCREW TRIDENT II LP HEX 6.5X25  [employer]. [id number] Right 1 Implanted   LINER ACET SZ E 42MM COCR - [id number]  LINER ACET SZ E 42MM COCR  [employer]. [id number] Right 1 Implanted   SCREW TRIDENT II LP HEX 6.5X25 - [id number]  SCREW TRIDENT II LP HEX 6.5X25  [employer] TO[employer]. [id number] Right 1 Implanted   SPACER CEMENT DISTAL 10MM - [id number]  SPACER CEMENT DISTAL 10MM  [employer]. [id number] Right 1 Implanted   STEM OMNIFIT 127DEG 35MM SZ 7 - [id number]  STEM OMNIFIT 127DEG 35MM SZ 7  [employer]. [id number] Right 1 Implanted   INSERT AMD/MDM X3 48MM - [id number]  INSERT AMD/MDM X3 48MM  [employer]. [id number] Right 1 Implanted   BIOLOX delta Cerami C-taper Femoral Head 28mm +5mm C-TPR    ELSA [id number] Right 1 Implanted       Operative Findings: Right femoral neck fracture    Estimated Blood Loss: 300  mL    Estimated Blood Loss has been documented.         Specimens:   Specimen (24h ago, onward)       Start     Ordered    08/28/24 1537  Specimen to Pathology, Surgery Orthopedics  Once        Comments: Pre-op Diagnosis: Closed fracture of right hip, initial encounter [S72.001A]Procedure(s):ARTHROPLASTY, HIP Number of specimens: 1Name of specimens: Right femoral head, bone and soft tissue, permanent, placed in pathology refrigerator.     References:    Click here for ordering Quick Tip   Question Answer Comment   Procedure Type: Orthopedics    Release to patient Immediate        08/28/24 1613                    [id number]

## 2024-08-28 NOTE — NURSING
Received to room 557A, awake, alert and orientedx4, vital signs with slightly elevated blood pressure,  patient with complaints of 6/10 right hip pain at present, mod amts of swelling noted to right upper thigh, +pulse, s.l to right forearm #20, woodward in place draining clear yellow urine, oriented to hospital and floor, will continue to monitor.

## 2024-08-28 NOTE — PROGRESS NOTES
Patient was ready to be shifted to the floor.Noticed that patient's gown was saturated with blood . Called for Surgery on call was informed about the situation. Surgeon on call came & assessed the patient's Surgical site. Reinforced some dressings with Surgical fluff & tapes. Patient's vitals were stable. Ordered for 2 PRBCs to be received on the floor as the patient was runnig on low hemoglobin level around 6.1

## 2024-08-28 NOTE — ASSESSMENT & PLAN NOTE
Patient admitted after trip and fall on her garden hose onto her patio striking her right hip and X-rays revealed closed subcapital right femur fracture. Orthopedics consulted in ED and plan to take to OR today for right hip arthroplasty.   NPO currently for surgery today.   Preoperative assessment: Routine labs, CXR and EKG reviewed. Patient is at low risk for perioperative cardiopulmonary complications. Patient with no active cardiac issues. Patient with good functional mets > 4 and with no significant cardiac clinical risk factors for intermediate risk surgery. Recommend to proceed to surgery as planned with no additional non-invasive cardiac testing required.  Pain control as per Hip Fracture Pathway with multimodal pain regimen with scheduled Tylenol 1000 mg po every 8 hours, Robaxin 500 mg po 4 times daily and Lyrica 75 mg po nightly and oral Oxycodone IR 5-10 mg po every 4 hours prn for breakthrough pain.   DVT prophylaxis pre-op with TEDs/SCDs.    Plan to monitor daily electrolytes and H/H post-op.   Start chemical DVT prophylaxis post-op after surgery for DVT prophylaxis and will need for a total of 30 days after hip fracture surgery.   Will consult PT/OT post-op for gait training and strengthening and restoration of ADLs.   Will consult  and case management to assist with discharge planning for this patient after surgery.    For now patient to be on bedrest and non weight bearing to right lower extremity. Ortho to update weight bearing post-op.

## 2024-08-28 NOTE — ANESTHESIA PROCEDURE NOTES
[facility]    Patient location during procedure: pre-op   Block not for primary anesthetic.  Reason for block: at surgeon's request and post-op pain management   Post-op Pain Location: R hip pain   Start time: 8/28/2024 1:15 PM  Timeout: 8/28/2024 1:15 PM   End time: 8/28/2024 1:25 PM    Staffing  Authorizing Provider: Jalen Velázquez MD  Performing Provider: Boston Daily DO    Staffing  Performed by: Boston Daily DO  Authorized by: Jalen Velázquez MD    Preanesthetic Checklist  Completed: patient identified, IV checked, site marked, risks and benefits discussed, surgical consent, monitors and equipment checked, pre-op evaluation and timeout performed  Peripheral Block  Patient position: supine  Prep: ChloraPrep and site prepped and draped  Patient monitoring: heart rate, cardiac monitor, continuous pulse ox, continuous capnometry and frequent blood pressure checks  Block type: fascia iliaca  Laterality: right  Injection technique: continuous  Needle  Needle type: Tuohy   Needle gauge: 17 G  Needle length: 3.5 in  Needle localization: anatomical landmarks and ultrasound guidance  Catheter type: spring wound  Catheter size: 19 G  Test dose: lidocaine 1.5% with Epi 1-to-200,000 and negative   -ultrasound image captured on disc.  Assessment  Injection assessment: negative aspiration, negative parasthesia and local visualized surrounding nerve  Paresthesia pain: none  Heart rate change: no  Slow fractionated injection: yes  Pain Tolerance: comfortable throughout block and no complaints  Medications:    Medications: ropivacaine (NAROPIN) injection 0.5% - Perineural   15 mL - 8/28/2024 1:25:00 PM    Additional Notes  A time out was conducted. Site cynthia confirmed with team and patient. Allergies reviewed.   Vital signs stable throughout block. RN monitoring vitals throughout.   Needle advanced under continuous ultrasound guidance.  Local injected incrementally after confirming negative aspiration. No signs or symptoms of  intravascular or intraneural injection noted.   No persistent paresthesias elicited or expressed. Patient tolerated procedure well.  30mL of 0.25% Ropivacaine with epinephrine 1:300K used for the block.  VSS.  [facility] RN monitoring vitals throughout procedure.  Patient tolerated procedure well.

## 2024-08-28 NOTE — ANESTHESIA PROCEDURE NOTES
Intubation    Date/Time: 8/28/2024 2:07 PM    Performed by: Diann Galindo CRNA  Authorized by: Arpita Coburn MD    Intubation:     Induction:  Intravenous    Intubated:  Postinduction    Mask Ventilation:  Easy mask    Attempts:  1    Attempted By:  CRNA    Method of Intubation:  Video laryngoscopy    Blade:  Muse 3    Laryngeal View Grade: Grade I - full view of cords      Difficult Airway Encountered?: No      Complications:  None    Airway Device:  Oral endotracheal tube    Airway Device Size:  7.0    Style/Cuff Inflation:  Cuffed (inflated to minimal occlusive pressure)    Tube secured:  21    Secured at:  The lips    Placement Verified By:  Capnometry    Complicating Factors:  None    Findings Post-Intubation:  BS equal bilateral and atraumatic/condition of teeth unchanged

## 2024-08-28 NOTE — HPI
Martha Mcfarlane is a 69 y.o. female with PMH of skin cancer and osteoporosis on Evenity ([place]osozunab) presenting with right hip pain. Patient was outside gardening when she tripped on a hose and fell onto right hip. Immediate pain and difficulty bearing weight. Denies head injury or LOC. Patient is not on any anticoagulation. Denies other MSK pains. Denies numbness, tingling, or paresthesias to the RLE. Lives with  and son. Uses no assistive device for ambulation. Patient states she is very active and enjoys exercising, riding bikes and gardening.       Denies IV drug use.   Denies tobacco use.  Denies alcohol use.  Denies history of chemo or radiation.  Denies any immunosuppressant medications.

## 2024-08-28 NOTE — PLAN OF CARE
Problem: Adult Inpatient Plan of Care  Goal: Plan of Care Review  Reactivated  Goal: Patient-Specific Goal (Individualized)  Reactivated  Goal: Absence of Hospital-Acquired Illness or Injury  Reactivated  Goal: Readiness for Transition of Care  Reactivated     Problem: Adult Inpatient Plan of Care  Goal: Plan of Care Review  Reactivated     Problem: Adult Inpatient Plan of Care  Goal: Patient-Specific Goal (Individualized)  Reactivated     Problem: Adult Inpatient Plan of Care  Goal: Absence of Hospital-Acquired Illness or Injury  Reactivated     Problem: Adult Inpatient Plan of Care  Goal: Readiness for Transition of Care  Reactivated   Remain SR on telemetry monitor. PRN medication effective for pain . Explained plan of care, verbalized understanding. Educated pt .on new medicine . No injury during shift, Side rails up x 2, call light by bedside.

## 2024-08-28 NOTE — OP NOTE
OPERATIVE NOTE     DATE OF PROCEDURE:  08/28/2024     PREOPERATIVE DIAGNOSIS:           Right femoral neck fracture, subcapital, closed, displaced, initial encounter  Fall from standing     POSTOPERATIVE DIAGNOSIS:         Right femoral neck fracture, subcapital, closed, displaced, initial encounter  Fall from standing     PROCEDURE:              Right total hip arthroplasty for femoral neck fracture     SURGEON:       Robbin Burnham MD     ASSISTANT:                 Adrián Kennedy MD     ANESTHESIA:              General with regional block     EBL:                  300 mL     COMPLICATIONS:  None     IMPLANTS:       Bianca  Trident 2 cup, 54 mm  6.5 mm screw x2 (25 mm, 25 mm)  MDM metal liner  Koko cemented stem, size 7, high offset  28 mm, +5 ceramic head, biolox delta  Poly insert  Simplex bone cement     5 FiberWire     Vancomycin 1g  Tobramycin 1.2g     SPECIMENS:    Femoral head sent for pathology     INDICATIONS FOR PROCEDURE:  69-year-old female  History of melanoma, no known Mets   History of osteoporosis, with 20 years of osteoporosis treatment, currently on Evenity  Fall 08/27/2024  Right hip pain, inability bear weight.  Came to ED. Evaluated by ER, hospitalist team, orthopaedic resident on call rubén. Diagnosed with a right displaced femoral neck fracture.  Admitted to the hospital for further evaluation treatment.     At the time my evaluation patient complained of isolated pain in right hip, 7/10, sharp, stabbing, no numbness no tingling.     Lives at home with   independent community ambulator, no gait aids  Performs all ADLs  Prior TIA, no residual symptoms   Denies prior heart attack, blood clot, diabetes, tobacco use   History of melanoma, but no known metastases   No antecedent hip pain prior to her mechanical fall     Counseled patient and family members on diagnosis and treatment options.  Discussed non operative and operative management options.  Non operative management would  consist of bed rest, traction, protected weight-bearing, likely result in malunion, nonunion, prolonged immobility, medical comorbidities associated with these.  Discussed operative intervention in the form of total hip arthroplasty.  Hopefully this would improve immediate pain, function, mobility, decrease overall risk of medical comorbidities.  Also discussed hemiarthroplasty and open reduction internal fixation, but given patients functional activity level and age, I feel that RUTH is in patients best interest to decrease risk of repeat operation and to maximize long term function.     The risks, benefits, and alternatives to surgery were discussed with the patient and/or family.    Specific risks discussed included, but were not limited to:  Leg length inequality, rotational malalignment, sciatic nerve palsy, dislocation/instability, chronic pain, stiffness, periprosthetic fracture, need for revision surgery, damage to nearby structures, including neurovascular structures leading to loss of function or loss of limb, bleeding, need for blood transfusion, pain, stiffness, scarring, numbness, tingling, weakness, compartment syndrome, malunion/nonunion, hardware failure, hardware prominence, infection, need for multiple staged procedures, prolonged antibiotics, iatrogenic fracture, heterotopic ossification, arthritis, a variety of medical complications including but not limited to heart attack, stroke, deep venous thrombosis, pulmonary embolism, prolonged hospitalization, prolonged intubation, and death.   Patient and/or family expressed an understanding and desires to proceed with surgery.   All questions were answered.  No guarantees were implied or stated.  Informed consent was obtained.     OPERATIVE PROCEDURE:  Patient met in the preoperative hold area and the correct site and side of surgery being the right hip were marked and verified.  Preop regional block placed by our anesthesia colleagues.  Patient brought  back to the operative suite.  General anesthesia smoothly induced.  Patient transferred over to operative table.  Placed in lateral position on peg board.  Axillary roll placed.  All bony prominences were appropriately padded.  Patient received 2 g Ancef, 1 g vancomycin for preoperative antibiotics.  The right lower extremity was then prepped and draped in normal sterile fashion.     Time-out was performed verifying the correct patient, site/side of surgery, surgical consent, radiographs as applicable, preop antibiotics, necessary equipment, anticipated blood loss, length of procedure, postoperative disposition.        Plan for a posterior approach to hip.  Incision was made in line with the femur curving gently towards the posterior superior iliac spine at the level of the greater trochanter.  Hemostasis was achieved as needed with electrocautery. Subcutaneous tissue was divided.  Fascia was identified.  IT band was split.  Glute max fascia was split.  Muscle was divided. Bursa was resected as needed. Charnley retractor was placed.  Piriformis tendon was identified. It was sharply detached from its origin.  The remaining short external rotators and posterior capsule were also detached and tagged.  The femoral neck fracture was visualized.  Fracture hematoma was evacuated. Femoral neck was presented to our view.  Homans were placed to protect the underlying structures.  A femoral neck cut was made with a saw approximately 15 mm above the lesser trochanter, just distal to the extent of the fracture. Femoral head was then removed with a corkscrew. Utilizing rongeur, removed bony shards left in the acetabulum.     We then turned our attention to acetabulum exposure. Anterior retractor was placed over the anterior lip. Inferior capsular release was performed as needed. An inferior Cobra retractor was placed. This provided complete view of her acetabulum. Labrum was completely excised. Ligamentum was completely excised  demonstrating the bony floor of the acetabulum. We then began reaming. Medialized until good bleeding bone was noted indicating we reached the floor of the acetabulum. We then sequentially reamed up to the appropriate size Reamer in line to line fashion at appropriate version/abduction.  We then placed a trial in the appropriate abduction and anteversion.  Good fit was noted.The appropriate size real acetabular component was then securely malleted into place in the appropriate version of approximately 40° abduction and 20° anteversion utilized anatomic landmarks.  It was good secure fit. We placed 2 screws through the cup in a safe location for added fixation. The acetabular shell was irrigated. The MDM liner was securely malleted into place. Acetabular retractors were removed.     We then turned our attention to the femur. Femoral neck elevator was utilized.   was utilized. Opening Reamer was utilized.  Lateralizing Reamer was utilized.  We then reamed and broached up to the appropriate size stem at approximately 15° of anteversion.  There was good secure fit and rotational control.  Calcar planer was utilized.  We then placed a trial neck and head and were satisfied with the leg length, abductor tension, hip stability.  The hip was stable at full extension with no undue shuck, hip was stable at full flexion with no impingement, the hip was stable 90° of flexion/30° adduction/30° internal rotation, 90° flexion/60° internal rotation.      The hip was dislocated.  Trial components were removed. Broach was removed. Cement restrictor was placed.  Canal was irrigated with pulse lavage saline. Cement was mixed on the back table. It was pressurized into the canal.  Final stem was placed in appropriate anteversion, approximately 15°.  Cement was allowed to harden.  Excess cement was removed. Acetabulum was irrigated.      Repeat head trial was placed. Once we were satisfied with appropriate limb length and  stability, final femoral head was chosen. Femoral neck was irrigated and dried. Femoral head was impacted on the Israel taper obtaining good secure fit. Once again acetabulum was checked and any debris were removed.  Acetabulum was irrigated. Hip was reduced. There was appropriate leg length, abductor tension and stability as per the above parameters.     Wounds thoroughly irrigated with saline. Hemostasis was achieved as needed with electrocautery. 1 g vancomycin and 1.2 g tobramycin was placed deep into the wound. Posterior capsule and short external rotators were repaired through drill holes in the greater trochanter with 5 FiberWire. Fascia closed with 1 Vicryl.  Deep tissue closed with 0 Vicryl.  Subcutaneous tissue closed with 2 O Vicryl.  Skin closed with 3 Monocryl.  Dermabond/prineo, Aquacel dressing applied.     At the conclusion of procedure the patient had soft and compressible compartments, brisk cap refill, palpable DP/PT pulse in the operative extremity.  Leg lengths were equal when supine     Prior to final closure all counts were confirmed to be correct.  Patient tolerated the procedure well without any complications, was awoken from anesthesia, transferred PACU for further recovery.     POSTOPERATIVE PLAN:  69-year-old female, osteoporosis, fall 08/27/2024, right femoral neck fracture     08/28/2024 - right total hip arthroplasty for femoral neck fracture     Antibiotics x 24 hr, followed by p.o. antibiotics x1 week  Eliquis x 4 wks for DVT prophylaxis    Weight-bearing as tolerated right lower extremity  Posterior hip precautions right lower extremity x6 weeks     Multimodal pain control  Hospitalist comanagement  Physical therapy  Calcium, vitamin-D, boost     Continue to follow up with endocrinology for treatment of osteoporosis     X-rays at subsequent followups:  Right hip     Follow-up postop 2 weeks, 6 weeks, 3 months, 6 months, 1 year     =====================  Robbin Burnham  MD  Orthopaedic Surgery

## 2024-08-28 NOTE — HOSPITAL COURSE
Patient admitted to [facility] Medicine Team H: Hip Fracture team and started on Hip Fracture Pathway with Orthopedic surgery consult for closed right displaced femoral neck femur fracture. Patient was seen and evaluated by Orthopedic surgery who recommended operative repair of hip fracture. Patient was medically optimized prior to surgery and to be taken to OR after optimization on 8/28/2024 for right total hip arthroplasty.  Ortho suggested Eliquis 2.5mg BID x 4 weeks, end date 9/26.  Anesthesia Pain managed the Ropivacaine catheter.  PT/OT was consulted postop who suggested low intense therapy.  She was discharged home with .    Ms. Martha Mcfarlane was deemed appropriate for discharge.  At the time of discharge, the plan of care was discussed with patient/family, who were agreeable and amenable.  All medications were verbally reviewed and discussed with the patient/family.  They endorsed understanding and compliance.  Informed them that these changes will be available on their discharge paperwork, as well.  Outpatient follow-ups were scheduled, or if unable to be scheduled ambulatory referrals were placed, and ER return precautions were given.  All questions were answered to the patient/family's satisfaction.  Ms. Martha Mcfarlane was subsequently discharged in stable condition.

## 2024-08-28 NOTE — SUBJECTIVE & OBJECTIVE
"Principal Problem:Closed displaced fracture of right femoral neck    Principal Orthopedic Problem: As above    Interval History: Patient seen and examined at bedside. BRIDGETTEON. Patient doing well. No complaints. She has been NPO since midnight. Hgb 13.2. Plan for OR today.      Review of patient's allergies indicates:   Allergen Reactions    Orange flavor      Severe headache, nausea    Iodine and iodide containing products     Shellfish containing products        Current Facility-Administered Medications   Medication    acetaminophen tablet 1,000 mg    ascorbic acid (vitamin C) tablet 500 mg    bisacodyL suppository 10 mg    melatonin tablet 6 mg    methocarbamoL tablet 500 mg    omega 3-dha-epa-fish oil capsule 2 capsule    ondansetron injection 4 mg    oxyCODONE immediate release tablet 5 mg    oxyCODONE immediate release tablet Tab 10 mg    polyethylene glycol packet 17 g    pregabalin capsule 75 mg    senna-docusate 8.6-50 mg per tablet 1 tablet    sodium chloride 0.9% flush 10 mL    vitamin D 1000 units tablet 2,000 Units    zolpidem tablet 5 mg     Objective:     Vital Signs (Most Recent):  Temp: 98.4 °F (36.9 °C) (08/28/24 0446)  Pulse: 72 (08/28/24 0446)  Resp: 18 (08/28/24 0446)  BP: 138/80 (08/28/24 0446)  SpO2: (!) 94 % (08/28/24 0446) Vital Signs (24h Range):  Temp:  [97.9 °F (36.6 °C)-99 °F (37.2 °C)] 98.4 °F (36.9 °C)  Pulse:  [65-82] 72  Resp:  [18-20] 18  SpO2:  [93 %-98 %] 94 %  BP: (138-176)/(69-86) 138/80     Weight: 64.4 kg (142 lb)  Height: 5' 11.5" (181.6 cm)  Body mass index is 19.53 kg/m².      Intake/Output Summary (Last 24 hours) at 8/28/2024 0646  Last data filed at 8/28/2024 0530  Gross per 24 hour   Intake 50 ml   Output 2500 ml   Net -2450 ml        Ortho/SPM Exam  A&O x 3  Regular Rate  Non-Labored Respirations    RLE:   Fires Quad/TA/EHL/GSC  SILT  Compartments soft  Brisk cap refill       Significant Labs: CBC:   Recent Labs   Lab 08/27/24  1245 08/28/24  0444   WBC 11.27 5.53   HGB " 13.2 11.5*   HCT 40.4 35.8*    145*     CMP:   Recent Labs   Lab 08/27/24  1245      K 3.7      CO2 26      BUN 19   CREATININE 0.7   CALCIUM 9.6   PROT 7.5   ALBUMIN 4.5   BILITOT 0.8   ALKPHOS 106   AST 24   ALT 20   ANIONGAP 10     All pertinent labs within the past 24 hours have been reviewed.    Significant Imaging: I have reviewed and interpreted all pertinent imaging results/findings.

## 2024-08-28 NOTE — PLAN OF CARE
[facility] - [facility]  Initial Discharge Assessment       Primary Care Provider: Nas Mcneill MD    Admission Diagnosis: Hip fracture [S72.009A]  Injury [T14.90XA]  Fall [W19.XXXA]  Closed fracture of right hip, initial encounter [S72.001A]    Admission Date: 8/27/2024  Expected Discharge Date: 8/30/2024    Transition of Care Barriers: None    Payor: MEDICARE / Plan: MEDICARE PART A & B / Product Type: Government /     Extended Emergency Contact Information  Primary Emergency Contact: Dany Mcfarlane  Address: [address] [place] [address]           [place] [address] [facility]  Home Phone: [phone]  Mobile Phone: [phone]  Relation: Spouse    Discharge Plan A: Home, Home with family  Discharge Plan B: Home with family, Home Health      [id number] - [place] - [address] AT [address] [facility]  [address]  [address]  [place] [address]  Phone: [phone] Fax: [phone]      Initial Assessment (most recent)       Adult Discharge Assessment - 08/28/24 0933          Discharge Assessment    Assessment Type Discharge Planning Assessment     Confirmed/corrected address, phone number and insurance Yes     Confirmed Demographics Correct on Facesheet     Source of Information patient;family     Communicated RAVEN with patient/caregiver Yes     People in Home spouse     Do you expect to return to your current living situation? Yes     Do you have help at home or someone to help you manage your care at home? Yes     Who are your caregiver(s) and their phone number(s)? Spouse (Dany)     Prior to hospitilization cognitive status: Alert/Oriented     Current cognitive status: Alert/Oriented     Walking or Climbing Stairs Difficulty no     Home Layout Able to live on 1st floor     Equipment Currently Used at Home none     Readmission within 30 days? No     Patient currently being followed by outpatient case management? No     Do you currently have service(s) that help you manage your care  at home? No     Do you take prescription medications? Yes     Do you have prescription coverage? Yes     Do you have any problems affording any of your prescribed medications? No     Is the patient taking medications as prescribed? yes     How do you get to doctors appointments? car, drives self;family or friend will provide     Are you on dialysis? No     Do you take coumadin? No     Discharge Plan A Home;Home with family     Discharge Plan B Home with family;Home Health     DME Needed Upon Discharge  none     Discharge Plan discussed with: Patient;Spouse/sig other     Transition of Care Barriers None                       SW completed discharge planning assessment with the patient at bedside. SW verified demographic information listed on the pt.'s Face sheet. Pt reports living with her spouse (Dany) in a single story home. Pt's spouse plans to help manage pt's care and provide transport upon discharge.SW is following this Pt for DC planning needs. There are no identified needs at this time.    Diane Juarez LMSW  Case Management   [facility]   Ext. [phone]

## 2024-08-28 NOTE — NURSING
Nurses Note -- 4 Eyes      8/28/2024   1:03 AM      Skin assessed during: Admit      [x] No Altered Skin Integrity Present    [x]Prevention Measures Documented      [] Yes- Altered Skin Integrity Present or Discovered   [] LDA Added if Not in Epic (Describe Wound)   [] New Altered Skin Integrity was Present on Admit and Documented in LDA   [] Wound Image Taken    Wound Care Consulted? No    Attending Nurse:  Luz Maria Metz RN/Staff Member: Wanda

## 2024-08-28 NOTE — ASSESSMENT & PLAN NOTE
Martha Mcfarlane is a 69 y.o. female with right femoral neck fracture, closed, NVI. They take no anticoagulation at home. They required no ambulatory assistive devices prior to this injury.     -Admitted to medicine hip fracture service for pre-operative clearance and medical evaluation  -To OR 8/28 for right hip arthroplasty  -Pt marked, booked, and consented for surgery  -DVT PPx: Hold anticoagulation  -Abx: Preop abx ordered  -Labs: Prealbumin 17, UA no UTI, Glucose 101, Albumin 4.5, Hemoglobin 13.2, Hematocrit 40.4, Platelets 158, White blood cell count 11.27, A1c 5.0, INR 1.0.   -Bed rest, woodward, NPO since midnight  -Iv: ordered for contralateral arm

## 2024-08-28 NOTE — ASSESSMENT & PLAN NOTE
Martha Mcfarlane is a 69 y.o. female with right femoral neck fracture, closed, NVI. They take no anticoagulation at home. They required no ambulatory assistive devices prior to this injury.     -Admitted to medicine hip fracture service for pre-operative clearance and medical evaluation  -To OR 8/28 for right hip arthroplasty  -Pt marked, booked, and consented for surgery  -DVT PPx: Hold anticoagulation  -Abx: Preop abx ordered  -Labs: Prealbumin 17, UA no UTI, Glucose 101, Albumin 4.5, Hemoglobin 13.2, Hematocrit 40.4, Platelets 158, White blood cell count 11.27, A1c 5.0, INR 1.0. Other lab results pending.  -Bed rest, woodward, NPO at midnight  -Iv: ordered for contralateral arm    Patient was explained in detail the severity of the injury that was suffered. Patient was explained the risks/benefits/and alternatives to operative management in detail including infection, bleeding, pain, nerve and vascular damage, heterotopic ossification, leg length discrepancies, rotational deformities and they express full understanding.  We discussed the 30% national first year mortality rate with hip fractures, the need for early mobilization, and the expected rehab course.  She expresses full understanding of the condition and expresses that they want to proceed with surgery. The patient is admitted to the medicine hip fracture service for optimization of medical comorbidities. Will plan for OR 8/28. No guarantees were made, informed consent was obtained. All questions were answered to patient's and family's satisfaction.

## 2024-08-28 NOTE — PROGRESS NOTES
[facility] Castaneda - Surgery ([facility])  [facility] Medicine  Progress Note    Patient Name: Martha Mcfarlane  MRN: [id number]  Patient Class: IP- Inpatient   Admission Date: 8/27/2024  Length of Stay: 1 days  Attending Physician: Beverly Carvajal MD  Primary Care Provider: Nas Mcneill MD        Subjective:     Principal Problem:Closed displaced fracture of right femoral neck        HPI:  69 y.o.female with history of TIA, recurrent UTIs, mixed incontinence, osteoporosis and followed by Endocrine as outpatient on on Evenity (Romosozunab) for treatment of her osteoporosis and kidney stones presented to the [facility] ER with complaint of right hip pain. Patient states pain is sharp/stabbing in the right groin and is aggravated by any weight bearing and any movement . She reports onset of symptoms was earlier today after trip and fall on her patio resulting in her landing on her right hip. Patient reports that she was outside doing yard work and she tripped the garden hose and lost her balance and went down and tried to catch herself with her arms as she uses braces on her arms but ended up striking her right elbow on the hard concrete as well as her right hip. Patient states she had immediate 8/10 pain to right groin area and could not bear weight due to pain in right hip.  helped her up an able to get her in car and they drove to ED to get evaluated. Patient denies head trauma. Patient denies to loss of consciousness, denies syncope, denies chest pain, denies dizziness prior or after fall. X-ray obtained in ER revealed right femoral neck hip fracture. Orthopedics and [facility] medicine service consulted and patient to be admitted to the hospital to the [facility] service.   Prior to admission patient's functional mobility was independent with no assistive device for home and community distances and very active at baseline. Patient does not have history of gait or balance problems. Patient does not have history of  previous falls or fractures. Patient does not have previous cardiac history such as MI or CAD. Patient does have previous history of TIA. Patient does not have previous history of compensated heart failure. Patient does not have history of diabetes. Patient does not have history of advanced kidney disease with creatinine > 2. Patient currently lives with her  who is at bedside in ED.   In ED, patient states she received IV Morphine for pain so now her right hip pain is 5/10. Labs reviewed. Hgb libby at 13.2. Platelets normal at 158,000. CMP normal. Vital signs stable. Reviewed home medications with patient and updated her home med list in EPIC and reconciled her home medications. Patient stated she took her morning medications today.       Overview/Hospital Course:  Patient admitted to [facility] Medicine Team H: Hip Fracture team and started on Hip Fracture Pathway with Orthopedic surgery consult for closed right displaced femoral neck femur fracture. Patient was seen and evaluated by Orthopedic surgery who recommended operative repair of hip fracture. Patient was medically optimized prior to surgery and to be taken to OR after optimization on 8/28/2024 for right total hip arthroplasty.    Interval History: Patient admitted yesterday with closed displaced right femoral neck fracture. Patient going to OR today for right hip arthroplasty to repair fracture.  at bedside. Patient reports 3/10 pain in right groin area. Patient states she actually has 3 trips coming up. She was supposed to go to [place] in early September and I told her she will not make that trip but supposed to go to [place] in October to visit relatives and trip in November to [place] to visit relatives. I told her not sure about October trip but should be able to make November trip as flying for both those trips. Answered questions today about surgery and recovery. I expect patient to do well after surgery as very active at baseline and  likely to progress well after surgery and likely will be a home discharge with home health PT/OT but will wait to see how she progresses with therapy post-op. Labs reviewed. Vitamin D normal at 32. Hgb decreased to 11.5 and was 13 on admit. Expected drop in Hgb related to hip fracture and patient with no clinical signs of bleeding. U/A from yesterday with no signs of infection. BMP and electrolytes normal.     Review of Systems   Constitutional:  Negative for fever.   Respiratory:  Negative for cough and shortness of breath.    Cardiovascular:  Negative for chest pain.   Gastrointestinal:  Negative for nausea and vomiting.   Musculoskeletal:  Positive for arthralgias (Right hip) and myalgias (Right groin).   Psychiatric/Behavioral:  Negative for agitation and confusion.      Objective:     Vital Signs (Most Recent):  Temp: 97.9 °F (36.6 °C) (08/28/24 1109)  Pulse: 75 (08/28/24 1109)  Resp: 18 (08/28/24 1109)  BP: 163/76 (08/28/24 1109)  SpO2: 97 % (08/28/24 1109) on room air Vital Signs (24h Range):  Temp:  [97.5 °F (36.4 °C)-99 °F (37.2 °C)] 97.9 °F (36.6 °C)  Pulse:  [65-78] 75  Resp:  [17-20] 18  SpO2:  [93 %-98 %] 97 %  BP: (138-163)/(69-86) 163/76     Weight: 64.4 kg (141 lb 15.6 oz)  Body mass index is 19.8 kg/m².    Intake/Output Summary (Last 24 hours) at 8/28/2024 1127  Last data filed at 8/28/2024 0530  Gross per 24 hour   Intake 50 ml   Output 2500 ml   Net -2450 ml         Physical Exam  Vitals and nursing note reviewed.   Constitutional:       General: She is awake. She is not in acute distress.     Appearance: Normal appearance. She is underweight. She is not ill-appearing.      Comments: Patient sitting up in hospital bed in no distress. Patient's  at bedside. Patient comfortable sitting up in bed waiting to go to surgery today.    Eyes:      Conjunctiva/sclera: Conjunctivae normal.   Cardiovascular:      Rate and Rhythm: Normal rate and regular rhythm.      Heart sounds: Normal heart sounds.  No murmur heard.  Pulmonary:      Effort: Pulmonary effort is normal. No respiratory distress.      Breath sounds: Normal breath sounds. No wheezing.   Abdominal:      General: Abdomen is flat. Bowel sounds are normal. There is no distension.      Palpations: Abdomen is soft.      Tenderness: There is no abdominal tenderness.   Musculoskeletal:      Right lower leg: No edema.      Left lower leg: No edema.   Skin:     General: Skin is warm.      Findings: No erythema.   Neurological:      Mental Status: She is alert and oriented to person, place, and time.   Psychiatric:         Mood and Affect: Mood normal.         Behavior: Behavior normal. Behavior is cooperative.         Thought Content: Thought content normal.         Judgment: Judgment normal.             Significant Labs: CBC:   Recent Labs   Lab 08/27/24  1245 08/28/24  0444   WBC 11.27 5.53   HGB 13.2 11.5*   HCT 40.4 35.8*    145*     CMP:   Recent Labs   Lab 08/27/24  1245 08/28/24  0444    137   K 3.7 3.9    104   CO2 26 26    96   BUN 19 16   CREATININE 0.7 0.7   CALCIUM 9.6 8.8   PROT 7.5  --    ALBUMIN 4.5  --    BILITOT 0.8  --    ALKPHOS 106  --    AST 24  --    ALT 20  --    ANIONGAP 10 7*     Magnesium:   Recent Labs   Lab 08/27/24  1955/24  0444   MG 1.9 2.1       Significant Imaging: I have reviewed all pertinent imaging results/findings within the past 24 hours.    Assessment/Plan:      * Closed displaced fracture of right femoral neck  Patient admitted after trip and fall on her garden hose onto her patio striking her right hip and X-rays revealed closed subcapital right femur fracture. Orthopedics consulted in ED and plan to take to OR today for right hip arthroplasty.   NPO currently for surgery today.   Preoperative assessment: Routine labs, CXR and EKG reviewed. Patient is at low risk for perioperative cardiopulmonary complications. Patient with no active cardiac issues. Patient with good functional mets > 4  and with no significant cardiac clinical risk factors for intermediate risk surgery. Recommend to proceed to surgery as planned with no additional non-invasive cardiac testing required.  Pain control as per Hip Fracture Pathway with multimodal pain regimen with scheduled Tylenol 1000 mg po every 8 hours, Robaxin 500 mg po 4 times daily and Lyrica 75 mg po nightly and oral Oxycodone IR 5-10 mg po every 4 hours prn for breakthrough pain.   DVT prophylaxis pre-op with TEDs/SCDs.    Plan to monitor daily electrolytes and H/H post-op.   Start chemical DVT prophylaxis post-op after surgery for DVT prophylaxis and will need for a total of 30 days after hip fracture surgery.   Will consult PT/OT post-op for gait training and strengthening and restoration of ADLs.   Will consult  and case management to assist with discharge planning for this patient after surgery.    For now patient to be on bedrest and non weight bearing to right lower extremity. Ortho to update weight bearing post-op.     Age-related osteoporosis with current pathological fracture  - Patient with known osteoporosis and current right hip fracture related to osteoporosis.   - Patient followed by Bob Solorio in Endocrine clinic for management of her osteoporosis and on Calcium and Vitamin D supplementation and recently started on Evenity injections for treatment of her osteoporosis. Per Dr. Nails previous osteoporosis treatment included:  Current regimen: Reclast 5 mg infusion  Prolia 60 mg due to severe constipation  Tried alendronate - last dose was 2014  Restarted alendronate 7/2017 but stopped it September 2017 due to blood pressure issues.     Mixed stress and urge urinary incontinence  Patient followed by Urology and patient reports since last cystoscopy and treatment by Urology no further issues with UTIs or incontinence. U/A on admit with no signs of infection.      Primary insomnia  Chronic condition. Patient takes Ambien 5 mg po  nightly to help to treat as needed and will continue in hospital.         VTE Risk Mitigation (From admission, onward)           Ordered     IP VTE HIGH RISK PATIENT  Once         08/28/24 1029     Place sequential compression device  Until discontinued         08/28/24 1029     Place sequential compression device  Until discontinued         08/27/24 1707     Place BRIGHT hose  Until discontinued         08/27/24 1707                    Discharge Planning   RAVEN: 8/30/2024     Code Status: Full Code   Is the patient medically ready for discharge?:     Reason for patient still in hospital (select all that apply): Patient trending condition  Discharge Plan A: Home, Home with family          Beverly Carvajal MD  Department of Hospital Medicine   [facility] - Surgery ([facility])

## 2024-08-28 NOTE — ANESTHESIA PREPROCEDURE EVALUATION
[facility]  Anesthesia Pre-Operative Evaluation         Patient Name: Martha Mcfarlane  YOB: 1955  MRN: [id number]    SUBJECTIVE:     Pre-operative evaluation for Procedure(s) (LRB):  ARTHROPLASTY, HIP, toi, lateral peg board, C arm, ancef, vanc, txa (Right)     08/27/2024    Martha Mcfarlane is a 69 y.o. female with a significant medical history of remote CVA with residual left facial droop, recurrent UTIs, mixed incontinence, osteoporosis who presents for the above procedure. X-ray obtained in ER revealed right femoral neck hip fracture.   LDA:        Peripheral IV - Single Lumen 08/27/24 1243 20 G Anterior;Right Forearm (Active)   Site Assessment Clean;Dry;Intact 08/27/24 1244   Extremity Assessment Distal to IV No abnormal discoloration;No redness;No swelling 08/27/24 1244   Dressing Status Clean;Dry;Intact 08/27/24 1244   Dressing Intervention Integrity maintained 08/27/24 1244   Number of days: 0            Urethral Catheter 08/27/24 1747 Straight-tip 16 Fr. (Active)   Number of days: 0       Prev airway: None documented.    Drips: None documented.      Patient Active Problem List   Diagnosis    Age-related osteoporosis with current pathological fracture    Hypercalciuria    Primary insomnia    Tenosynovitis, de Quervain    History of melanoma    Aortic calcification    Mixed stress and urge urinary incontinence    [place]-Walker grade 1 cystocele    Closed displaced fracture of right femoral neck       Review of patient's allergies indicates:   Allergen Reactions    Orange flavor      Severe headache, nausea    Iodine and iodide containing products     Shellfish containing products        Current Inpatient Medications:   acetaminophen  1,000 mg Oral Q8H    [START ON 8/28/2024] ascorbic acid (vitamin C)  500 mg Oral Daily    methocarbamoL  500 mg Oral QID    [START ON 8/28/2024] omega 3-dha-epa-fish oil  2 capsule Oral Daily    [START ON 8/28/2024] polyethylene glycol  17 g Oral  Daily    pregabalin  75 mg Oral QHS    senna-docusate 8.6-50 mg  1 tablet Oral BID    [START ON 8/28/2024] vitamin D  2,000 Units Oral Daily       No current facility-administered medications on file prior to encounter.     Current Outpatient Medications on File Prior to Encounter   Medication Sig Dispense Refill    ascorbic acid, vitamin C, (VITAMIN C) 500 MG tablet Take 500 mg by mouth once daily.      CALCIUM CARBONATE/VITAMIN D3 (VITAMIN D-3 ORAL) Take 1 tablet by mouth Daily.      estradioL (ESTRACE) 0.01 % (0.1 mg/gram) vaginal cream Place 1 g vaginally every 7 days.      fish oil-omega-3 fatty acids 300-1,000 mg capsule Take 2 g by mouth once daily.      magnesium 30 mg Tab Take 1 tablet by mouth once daily.       polyethylene glycol (MIRALAX) 17 gram/dose powder Take 17 g by mouth once daily.      zolpidem (AMBIEN) 5 MG Tab Take 1 tablet (5 mg total) by mouth nightly as needed (insomnia). 30 tablet 1    nystatin (MYCOSTATIN) cream Apply topically 2 (two) times daily. for 7 days 30 g 1       Past Surgical History:   Procedure Laterality Date    APPENDECTOMY      COLON SURGERY      COLONOSCOPY      COLONOSCOPY N/A 12/15/2015    Procedure: COLONOSCOPY;  Surgeon: ADALBERTO Liao MD;  Location: [address];  Service: Endoscopy;  Laterality: N/A;    CYSTOSCOPY WITH CYSTOMETROGRAPHY N/A 3/7/2024    Procedure: CYSTOSCOPY, WITH CYSTOMETROGRAM;  Surgeon: Jabari Hager MD;  Location: [facility] OR;  Service: Urology;  Laterality: N/A;    CYSTOSCOPY WITH URODYNAMIC TESTING N/A 3/7/2024    Procedure: CYSTOSCOPY, WITH URODYNAMIC TESTING;  Surgeon: Jabari Hager MD;  Location: [facility] OR;  Service: Urology;  Laterality: N/A;    DE QUERVAIN'S RELEASE Right 10/6/2021    Procedure: RELEASE, HAND, FOR DEQUERVAIN'S TENOSYNOVITIS - right;  Surgeon: Aric Leonard MD;  Location: [facility] OR;  Service: Orthopedics;  Laterality: Right;    ELECTROMYOGRAPHY N/A 3/7/2024    Procedure: EMG (ELECTROMYOGRAPHY);  Surgeon: Madan  Jabari EDOUARD MD;  Location: [facility];  Service: Urology;  Laterality: N/A;    ESOPHAGOGASTRODUODENOSCOPY N/A 4/8/2020    Procedure: EGD (ESOPHAGOGASTRODUODENOSCOPY);  Surgeon: Jonathan Roth MD;  Location: [facility] ([address] [place] [address]);  Service: Endoscopy;  Laterality: N/A;  check stat hct before     labs will be drawn prior-GT    HYSTERECTOMY      INTUSSUSCEPTION REPAIR      LASIK      LEFT COLECTOMY      melanoma on face removal      PARTIAL HYSTERECTOMY      UROFLOWMETRY N/A 3/7/2024    Procedure: UROFLOWMETRY;  Surgeon: Jabari Hager MD;  Location: [facility];  Service: Urology;  Laterality: N/A;    VOIDING URETHROCYSTOGRAPHY N/A 3/7/2024    Procedure: CYSTOURETHROGRAM, VOIDING;  Surgeon: Jabari Hager MD;  Location: [facility];  Service: Urology;  Laterality: N/A;       Social History     Socioeconomic History    Marital status:    Tobacco Use    Smoking status: Never    Smokeless tobacco: Never   Substance and Sexual Activity    Alcohol use: Never    Drug use: Never    Sexual activity: Not Currently     Social Determinants of Health     Financial Resource Strain: Low Risk  (6/27/2024)    Overall Financial Resource Strain (CARDIA)     Difficulty of Paying Living Expenses: Not hard at all   Food Insecurity: Unknown (6/27/2024)    Hunger Vital Sign     Worried About Running Out of Food in the Last Year: Never true   Transportation Needs: No Transportation Needs (4/20/2023)    PRAPARE - Transportation     Lack of Transportation (Medical): No     Lack of Transportation (Non-Medical): No   Physical Activity: Unknown (6/27/2024)    Exercise Vital Sign     Minutes of Exercise per Session: 50 min   Stress: No Stress Concern Present (6/27/2024)    [employer] [place] - Occupational Stress Questionnaire     Feeling of Stress : Not at all   Housing Stability: Low Risk  (4/20/2023)    Housing Stability Vital Sign     Unable to Pay for Housing in the Last Year: No     Number of Places Lived in the Last  "Year: 1     Unstable Housing in the Last Year: No       OBJECTIVE:     Vital Signs Range:      7/17/2024     3:53 PM 8/14/2024     2:25 PM 8/27/2024    10:39 AM   Vitals - 1 value per visit   SYSTOLIC 148 147 176   DIASTOLIC 65 69 86   Pulse 72 70 80   Temp 36.6 °C (97.9 °F) 36.9 °C (98.5 °F) 36.9 °C (98.5 °F)   Resp 20 19 20   SPO2 100 % 97 % 97 %   Weight (lb) 140.54 141.09 141.09   Weight (kg) 63.75 64 64   Height 5' 11" (1.803 m) 6' (1.829 m) 6' (1.829 m)   BMI (Calculated) 19.6 19.1 19.1         CBC:   Lab Results   Component Value Date    WBC 11.27 08/27/2024    HGB 13.2 08/27/2024    HCT 40.4 08/27/2024    MCV 90 08/27/2024     08/27/2024         CMP:   Sodium   Date Value Ref Range Status   08/27/2024 138 136 - 145 mmol/L Final     Potassium   Date Value Ref Range Status   08/27/2024 3.7 3.5 - 5.1 mmol/L Final     Chloride   Date Value Ref Range Status   08/27/2024 102 95 - 110 mmol/L Final     CO2   Date Value Ref Range Status   08/27/2024 26 23 - 29 mmol/L Final     Glucose   Date Value Ref Range Status   08/27/2024 101 70 - 110 mg/dL Final     BUN   Date Value Ref Range Status   08/27/2024 19 8 - 23 mg/dL Final     Creatinine   Date Value Ref Range Status   08/27/2024 0.7 0.5 - 1.4 mg/dL Final     Calcium   Date Value Ref Range Status   08/27/2024 9.6 8.7 - 10.5 mg/dL Final     Total Protein   Date Value Ref Range Status   08/27/2024 7.5 6.0 - 8.4 g/dL Final     Albumin   Date Value Ref Range Status   08/27/2024 4.5 3.5 - 5.2 g/dL Final     Total Bilirubin   Date Value Ref Range Status   08/27/2024 0.8 0.1 - 1.0 mg/dL Final     Comment:     For infants and newborns, interpretation of results should be based  on gestational age, weight and in agreement with clinical  observations.    Premature Infant recommended reference ranges:  Up to 24 hours.............<8.0 mg/dL  Up to 48 hours............<12.0 mg/dL  3-5 days..................<15.0 mg/dL  6-29 days.................<15.0 mg/dL       Alkaline " Phosphatase   Date Value Ref Range Status   08/27/2024 106 55 - 135 U/L Final     AST   Date Value Ref Range Status   08/27/2024 24 10 - 40 U/L Final     ALT   Date Value Ref Range Status   08/27/2024 20 10 - 44 U/L Final     Anion Gap   Date Value Ref Range Status   08/27/2024 10 8 - 16 mmol/L Final     eGFR if    Date Value Ref Range Status   10/27/2021 >60.0 >60 mL/min/1.73 m^2 Final     eGFR if non    Date Value Ref Range Status   10/27/2021 >60.0 >60 mL/min/1.73 m^2 Final     Comment:     Calculation used to obtain the estimated glomerular filtration  rate (eGFR) is the CKD-EPI equation.          INR:  Lab Results   Component Value Date    INR 1.0 08/27/2024    INR 1.0 11/13/2005    INR 1.0 11/10/2005       EKG:   Results for orders placed or performed in visit on 04/03/20   EKG 12-lead    Collection Time: 04/03/20  7:22 PM    Narrative    Test Reason : I49.9,    Vent. Rate : 097 BPM     Atrial Rate : 097 BPM     P-R Int : 184 ms          QRS Dur : 084 ms      QT Int : 420 ms       P-R-T Axes : 061 075 093 degrees     QTc Int : 533 ms    Normal sinus rhythm  Nonspecific ST abnormality  Prolonged QT  Abnormal ECG  When compared with ECG of 17-NOV-2010 15:40,  Premature atrial complexes are no longer Present  T wave amplitude has increased in Anterior leads  QT has lengthened  Confirmed by Milton MARTIN MD, Jered SPANGLER ([id number]) on 4/6/2020 11:06:58 AM    Referred By: TWIN OAKLEY           Confirmed By:Jered Rose III, MD        2D ECHO:   No results found for this or any previous visit.         ASSESSMENT/PLAN:       Pre-op Assessment    I have reviewed the Patient Summary Reports.       I have reviewed the Medications.     Review of Systems  Anesthesia Hx:  No problems with previous Anesthesia             Denies Family Hx of Anesthesia complications.    Denies Personal Hx of Anesthesia complications.                    Cardiovascular:      Denies Hypertension.   Denies MI.  Denies  CAD.    Denies CABG/stent.                                     Pulmonary:    Denies COPD.  Denies Asthma.                    Neurological:   CVA                                    Endocrine:  Denies Diabetes.               Physical Exam  General: Well nourished, Cooperative, Alert and Oriented    Airway:  Mallampati: III   Mouth Opening: Normal  TM Distance: Normal  Tongue: Normal  Neck ROM: Normal ROM    Dental:  Periodontal disease        Anesthesia Plan  Type of Anesthesia, risks & benefits discussed:    Anesthesia Type: Gen ETT, Regional  Intra-op Monitoring Plan: Standard ASA Monitors  Post Op Pain Control Plan: multimodal analgesia and IV/PO Opioids PRN  Induction:  IV  Airway Plan: Direct and Video, Post-Induction  Informed Consent: Informed consent signed with the Patient and all parties understand the risks and agree with anesthesia plan.  All questions answered.   ASA Score: 3  Day of Surgery Review of History & Physical: H&P Update referred to the surgeon/provider.    Ready For Surgery From Anesthesia Perspective.     .

## 2024-08-28 NOTE — SUBJECTIVE & OBJECTIVE
Past Medical History:   Diagnosis Date    Age-related osteoporosis with current pathological fracture     Aortic calcification 04/21/2023    Seen on X-ray chest 04/03/2020      [place] grade 1 cystocele 03/07/2024    Constipation - functional     Encounter for blood transfusion     History of COVID-19 10/27/2021    Kidney calculi     Melanoma     Mitral valve prolapse     Mixed stress and urge urinary incontinence 03/07/2024    Osteoporosis, unspecified     Primary insomnia 01/20/2016    Stroke     tia    Tenosynovitis, de Quervain 10/06/2021       Past Surgical History:   Procedure Laterality Date    APPENDECTOMY      COLON SURGERY      COLONOSCOPY      COLONOSCOPY N/A 12/15/2015    Procedure: COLONOSCOPY;  Surgeon: ADALBERTO Liao MD;  Location: [facility] (4TH FLR);  Service: Endoscopy;  Laterality: N/A;    CYSTOSCOPY WITH CYSTOMETROGRAPHY N/A 3/7/2024    Procedure: CYSTOSCOPY, WITH CYSTOMETROGRAM;  Surgeon: Jabari Hager MD;  Location: [facility];  Service: Urology;  Laterality: N/A;    CYSTOSCOPY WITH URODYNAMIC TESTING N/A 3/7/2024    Procedure: CYSTOSCOPY, WITH URODYNAMIC TESTING;  Surgeon: Jabari Hager MD;  Location: [facility] OR;  Service: Urology;  Laterality: N/A;    DE QUERVAIN'S RELEASE Right 10/6/2021    Procedure: RELEASE, HAND, FOR DEQUERVAIN'S TENOSYNOVITIS - right;  Surgeon: Aric Leonard MD;  Location: [facility];  Service: Orthopedics;  Laterality: Right;    ELECTROMYOGRAPHY N/A 3/7/2024    Procedure: EMG (ELECTROMYOGRAPHY);  Surgeon: Jabari Hager MD;  Location: [facility] OR;  Service: Urology;  Laterality: N/A;    ESOPHAGOGASTRODUODENOSCOPY N/A 4/8/2020    Procedure: EGD (ESOPHAGOGASTRODUODENOSCOPY);  Surgeon: Jonathan Roth MD;  Location: [facility] (2ND FLR);  Service: Endoscopy;  Laterality: N/A;  check stat hct before     labs will be drawn prior-GT    HYSTERECTOMY      INTUSSUSCEPTION REPAIR      LASIK      LEFT COLECTOMY      melanoma on face removal      PARTIAL HYSTERECTOMY       UROFLOWMETRY N/A 3/7/2024    Procedure: UROFLOWMETRY;  Surgeon: Jabari Hager MD;  Location: [facility] OR;  Service: Urology;  Laterality: N/A;    VOIDING URETHROCYSTOGRAPHY N/A 3/7/2024    Procedure: CYSTOURETHROGRAM, VOIDING;  Surgeon: Jabari Hager MD;  Location: [facility] OR;  Service: Urology;  Laterality: N/A;       Review of patient's allergies indicates:   Allergen Reactions    Orange flavor      Severe headache, nausea    Iodine and iodide containing products     Shellfish containing products        Current Facility-Administered Medications   Medication    acetaminophen tablet 1,000 mg    [START ON 8/28/2024] ascorbic acid (vitamin C) tablet 500 mg    bisacodyL suppository 10 mg    melatonin tablet 6 mg    methocarbamoL tablet 500 mg    [START ON 8/28/2024] omega 3-dha-epa-fish oil capsule 2 capsule    ondansetron injection 4 mg    oxyCODONE immediate release tablet 5 mg    oxyCODONE immediate release tablet Tab 10 mg    [START ON 8/28/2024] polyethylene glycol packet 17 g    pregabalin capsule 75 mg    senna-docusate 8.6-50 mg per tablet 1 tablet    sodium chloride 0.9% flush 10 mL    [START ON 8/28/2024] vitamin D 1000 units tablet 2,000 Units    zolpidem tablet 5 mg     Family History       Problem Relation (Age of Onset)    Autism Son    Diabetes Father, Brother, Brother    Heart disease Brother    Hepatitis Mother    Hyperlipidemia Brother, Brother    Hypertension Brother, Brother    Parkinsonism Brother          Tobacco Use    Smoking status: Never    Smokeless tobacco: Never   Substance and Sexual Activity    Alcohol use: Never    Drug use: Never    Sexual activity: Not Currently     ROS  Constitutional: negative for fevers  Eyes: negative visual changes  ENT: negative for hearing loss  Respiratory: negative for dyspnea  Cardiovascular: negative for chest pain  Gastrointestinal: negative for abdominal pain  Genitourinary: negative for dysuria  Neurological: negative for headaches  Behavioral/Psych:  "negative for hallucinations  Endocrine: negative for temperature intolerance    Objective:     Vital Signs (Most Recent):  Temp: 98.1 °F (36.7 °C) (08/27/24 2150)  Pulse: 73 (08/27/24 2150)  Resp: 18 (08/27/24 2150)  BP: (!) 157/86 (08/27/24 2150)  SpO2: (!) 93 % (08/27/24 2150) Vital Signs (24h Range):  Temp:  [97.9 °F (36.6 °C)-98.5 °F (36.9 °C)] 98.1 °F (36.7 °C)  Pulse:  [68-82] 73  Resp:  [18-20] 18  SpO2:  [93 %-98 %] 93 %  BP: (148-176)/(69-86) 157/86     Weight: 64.4 kg (142 lb)  Height: 5' 11.5" (181.6 cm)  Body mass index is 19.53 kg/m².      Intake/Output Summary (Last 24 hours) at 8/27/2024 2231  Last data filed at 8/27/2024 2223  Gross per 24 hour   Intake 50 ml   Output 700 ml   Net -650 ml        Ortho/SPM Exam  General:  no acute distress, appears stated age   Neuro: alert and oriented x3  Psych: normal mood  Head: normocephalic, atraumatic.  Eyes: no scleral icterus  Mouth: moist mucous membranes  Cardiovascular: extremities warm and well perfused  Lungs: breathing comfortably, equal chest rise bilat  Skin: clean, dry, intact (any exceptions noted in below musculoskeletal exam)    MSK:  RUE:  - Small abrasion over elbow  - No swelling  - No ecchymosis, erythema, or signs of cellulitis  - NonTTP throughout  - AROM and PROM of the shoulder, elbow, wrist, and hand intact without pain  - Axillary/AIN/PIN/Radial/Median/Ulnar Nerves assessed in isolation without deficit  - SILT throughout  - Compartments soft  - Radial artery palpated   - Capillary Refill <3s    LUE:  - Skin intact throughout, no open wounds  - No swelling  - No ecchymosis, erythema, or signs of cellulitis  - NonTTP throughout  - AROM and PROM of the shoulder, elbow, wrist, and hand intact without pain  - Axillary/AIN/PIN/Radial/Median/Ulnar Nerves assessed in isolation without deficit  - SILT throughout  - Compartments soft  - Radial artery palpated   - Capillary Refill <3s    RLE:  - Skin intact throughout, no scars present  - No " swelling  - No erythema, or signs of cellulitis  - Ecchymosis over greater trochanter  - TTP at hip/proximal femur  - No tenderness to palpation of middle or distal femur  - No tenderness to palpation of proximal, middle, or distal aspects of tibia or fibula  - No tenderness to palpation of foot  - Pain with any ROM at hip  - Full painless ROM of knee and ankle  - Compartments soft  - SILT Sa/Johnson/DP/SP/T  - Motor intact EHL/FHL/TA/Gastroc  - 2+ DP  - Brisk capillary refill       LLE:  - Skin intact throughout, no open wounds  - No swelling  - No ecchymosis, erythema, or signs of cellulitis  - NonTTP throughout  - AROM and PROM of the hip, knee, ankle, and foot intact without pain  - TA/EHL/Gastroc/FHL assessed in isolation without deficit  - SILT throughout  - Compartments soft  - DP palpated  - Capillary Refill <3s  - Negative Log roll  - Negative Stinchfield    Spine/pelvis/axial body:  No pain with compression of pelvis  No chest wall or abdominal tenderness         Significant Labs: A1C:   Recent Labs   Lab 08/1954   HGBA1C 5.0     CBC:   Recent Labs   Lab 08/27/24  1245   WBC 11.27   HGB 13.2   HCT 40.4        CMP:   Recent Labs   Lab 08/27/24  1245      K 3.7      CO2 26      BUN 19   CREATININE 0.7   CALCIUM 9.6   PROT 7.5   ALBUMIN 4.5   BILITOT 0.8   ALKPHOS 106   AST 24   ALT 20   ANIONGAP 10     Coagulation:   Recent Labs   Lab 08/1954   LABPROT 11.1   INR 1.0     Prealbumin:   Recent Labs   Lab 08/1955   PREALBUMIN 17*     Urine Studies:   Recent Labs   Lab 08/27/24 1724   COLORU Yellow   APPEARANCEUA Clear   PHUR 7.0   SPECGRAV 1.015   PROTEINUA Negative   GLUCUA Negative   KETONESU 2+*   BILIRUBINUA Negative   OCCULTUA Negative   NITRITE Negative   LEUKOCYTESUR Negative     All pertinent labs within the past 24 hours have been reviewed.    Significant Imaging: I have reviewed and interpreted all pertinent imaging results/findings.  XR R hip shows a  displaced right femoral neck fracture

## 2024-08-28 NOTE — SUBJECTIVE & OBJECTIVE
Interval History: Patient admitted yesterday with closed displaced right femoral neck fracture. Patient going to OR today for right hip arthroplasty to repair fracture.  at bedside. Patient reports 3/10 pain in right groin area. Patient states she actually has 3 trips coming up. She was supposed to go to [place] in early September and I told her she will not make that trip but supposed to go to [place] in October to visit relatives and trip in November to [place] to visit relatives. I told her not sure about October trip but should be able to make November trip as flying for both those trips. Answered questions today about surgery and recovery. I expect patient to do well after surgery as very active at baseline and likely to progress well after surgery and likely will be a home discharge with home health PT/OT but will wait to see how she progresses with therapy post-op. Labs reviewed. Vitamin D normal at 32. Hgb decreased to 11.5 and was 13 on admit. Expected drop in Hgb related to hip fracture and patient with no clinical signs of bleeding. U/A from yesterday with no signs of infection. BMP and electrolytes normal.     Review of Systems   Constitutional:  Negative for fever.   Respiratory:  Negative for cough and shortness of breath.    Cardiovascular:  Negative for chest pain.   Gastrointestinal:  Negative for nausea and vomiting.   Musculoskeletal:  Positive for arthralgias (Right hip) and myalgias (Right groin).   Psychiatric/Behavioral:  Negative for agitation and confusion.      Objective:     Vital Signs (Most Recent):  Temp: 97.9 °F (36.6 °C) (08/28/24 1109)  Pulse: 75 (08/28/24 1109)  Resp: 18 (08/28/24 1109)  BP: 163/76 (08/28/24 1109)  SpO2: 97 % (08/28/24 1109) on room air Vital Signs (24h Range):  Temp:  [97.5 °F (36.4 °C)-99 °F (37.2 °C)] 97.9 °F (36.6 °C)  Pulse:  [65-78] 75  Resp:  [17-20] 18  SpO2:  [93 %-98 %] 97 %  BP: (138-163)/(69-86) 163/76     Weight: 64.4 kg (141 lb 15.6 oz)  Body mass  index is 19.8 kg/m².    Intake/Output Summary (Last 24 hours) at 8/28/2024 1127  Last data filed at 8/28/2024 0530  Gross per 24 hour   Intake 50 ml   Output 2500 ml   Net -2450 ml         Physical Exam  Vitals and nursing note reviewed.   Constitutional:       General: She is awake. She is not in acute distress.     Appearance: Normal appearance. She is underweight. She is not ill-appearing.      Comments: Patient sitting up in hospital bed in no distress. Patient's  at bedside. Patient comfortable sitting up in bed waiting to go to surgery today.    Eyes:      Conjunctiva/sclera: Conjunctivae normal.   Cardiovascular:      Rate and Rhythm: Normal rate and regular rhythm.      Heart sounds: Normal heart sounds. No murmur heard.  Pulmonary:      Effort: Pulmonary effort is normal. No respiratory distress.      Breath sounds: Normal breath sounds. No wheezing.   Abdominal:      General: Abdomen is flat. Bowel sounds are normal. There is no distension.      Palpations: Abdomen is soft.      Tenderness: There is no abdominal tenderness.   Musculoskeletal:      Right lower leg: No edema.      Left lower leg: No edema.   Skin:     General: Skin is warm.      Findings: No erythema.   Neurological:      Mental Status: She is alert and oriented to person, place, and time.   Psychiatric:         Mood and Affect: Mood normal.         Behavior: Behavior normal. Behavior is cooperative.         Thought Content: Thought content normal.         Judgment: Judgment normal.             Significant Labs: CBC:   Recent Labs   Lab 08/27/24  1245 08/28/24  0444   WBC 11.27 5.53   HGB 13.2 11.5*   HCT 40.4 35.8*    145*     CMP:   Recent Labs   Lab 08/27/24  1245 08/28/24  0444    137   K 3.7 3.9    104   CO2 26 26    96   BUN 19 16   CREATININE 0.7 0.7   CALCIUM 9.6 8.8   PROT 7.5  --    ALBUMIN 4.5  --    BILITOT 0.8  --    ALKPHOS 106  --    AST 24  --    ALT 20  --    ANIONGAP 10 7*     Magnesium:    Recent Labs   Lab 08/1954 08/28/24  0444   MG 1.9 2.1       Significant Imaging: I have reviewed all pertinent imaging results/findings within the past 24 hours.

## 2024-08-28 NOTE — PROGRESS NOTES
"[facility] 234 beds at the [facility] - Surgery  Orthopedics  Progress Note    Patient Name: Martha Mcfarlane  MRN: [id number]  Admission Date: 8/27/2024  Hospital Length of Stay: 1 days  Attending Provider: Beverly Carvajal MD  Primary Care Provider: Nas Mcneill MD  Follow-up For: Procedure(s) (LRB):  ARTHROPLASTY, HIP, toi, lateral peg board, C arm, ancef, vanc, txa (Right)    Post-Operative Day:    Subjective:     Principal Problem:Closed displaced fracture of right femoral neck    Principal Orthopedic Problem: As above    Interval History: Patient seen and examined at bedside. NAEON. Patient doing well. No complaints. She has been NPO since midnight. Hgb 13.2. Plan for OR today.      Review of patient's allergies indicates:   Allergen Reactions    Orange flavor      Severe headache, nausea    Iodine and iodide containing products     Shellfish containing products        Current Facility-Administered Medications   Medication    acetaminophen tablet 1,000 mg    ascorbic acid (vitamin C) tablet 500 mg    bisacodyL suppository 10 mg    melatonin tablet 6 mg    methocarbamoL tablet 500 mg    omega 3-dha-epa-fish oil capsule 2 capsule    ondansetron injection 4 mg    oxyCODONE immediate release tablet 5 mg    oxyCODONE immediate release tablet Tab 10 mg    polyethylene glycol packet 17 g    pregabalin capsule 75 mg    senna-docusate 8.6-50 mg per tablet 1 tablet    sodium chloride 0.9% flush 10 mL    vitamin D 1000 units tablet 2,000 Units    zolpidem tablet 5 mg     Objective:     Vital Signs (Most Recent):  Temp: 98.4 °F (36.9 °C) (08/28/24 0446)  Pulse: 72 (08/28/24 0446)  Resp: 18 (08/28/24 0446)  BP: 138/80 (08/28/24 0446)  SpO2: (!) 94 % (08/28/24 0446) Vital Signs (24h Range):  Temp:  [97.9 °F (36.6 °C)-99 °F (37.2 °C)] 98.4 °F (36.9 °C)  Pulse:  [65-82] 72  Resp:  [18-20] 18  SpO2:  [93 %-98 %] 94 %  BP: (138-176)/(69-86) 138/80     Weight: 64.4 kg (142 lb)  Height: 5' 11.5" (181.6 cm)  Body mass index is 19.53 " kg/m².      Intake/Output Summary (Last 24 hours) at 8/28/2024 0646  Last data filed at 8/28/2024 0530  Gross per 24 hour   Intake 50 ml   Output 2500 ml   Net -2450 ml        Ortho/SPM Exam  A&O x 3  Regular Rate  Non-Labored Respirations    RLE:   Fires Quad/TA/EHL/GSC  SILT  Compartments soft  Brisk cap refill       Significant Labs: CBC:   Recent Labs   Lab 08/27/24  1245 08/28/24  0444   WBC 11.27 5.53   HGB 13.2 11.5*   HCT 40.4 35.8*    145*     CMP:   Recent Labs   Lab 08/27/24  1245      K 3.7      CO2 26      BUN 19   CREATININE 0.7   CALCIUM 9.6   PROT 7.5   ALBUMIN 4.5   BILITOT 0.8   ALKPHOS 106   AST 24   ALT 20   ANIONGAP 10     All pertinent labs within the past 24 hours have been reviewed.    Significant Imaging: I have reviewed and interpreted all pertinent imaging results/findings.  Assessment/Plan:     * Closed displaced fracture of right femoral neck  Martha Mcfarlane is a 69 y.o. female with right femoral neck fracture, closed, NVI. They take no anticoagulation at home. They required no ambulatory assistive devices prior to this injury.     -Admitted to medicine hip fracture service for pre-operative clearance and medical evaluation  -To OR 8/28 for right hip arthroplasty  -Pt marked, booked, and consented for surgery  -DVT PPx: Hold anticoagulation  -Abx: Preop abx ordered  -Labs: Prealbumin 17, UA no UTI, Glucose 101, Albumin 4.5, Hemoglobin 13.2, Hematocrit 40.4, Platelets 158, White blood cell count 11.27, A1c 5.0, INR 1.0.   -Bed rest, [place]ward, NPO since midnight  -Iv: ordered for contralateral arm            CHANDLER Perez MD  Orthopedics  [facility] - Surgery

## 2024-08-28 NOTE — ASSESSMENT & PLAN NOTE
- Patient with known osteoporosis and current right hip fracture related to osteoporosis.   - Patient followed by Bob Solorio in Endocrine clinic for management of her osteoporosis and on Calcium and Vitamin D supplementation and recently started on Evenity injections for treatment of her osteoporosis. Per Dr. Nails previous osteoporosis treatment included:  Current regimen: Reclast 5 mg infusion  Prolia 60 mg due to severe constipation  Tried alendronate - last dose was 2014  Restarted alendronate 7/2017 but stopped it September 2017 due to blood pressure issues.

## 2024-08-28 NOTE — TRANSFER OF CARE
"Anesthesia Transfer of Care Note    Patient: Martha Mcfarlane    Procedure(s) Performed: Procedure(s) (LRB):  ARTHROPLASTY, HIP (Right)    Patient location: PACU    Anesthesia Type: general    Transport from OR: Transported from OR on 6-10 L/min O2 by face mask with adequate spontaneous ventilation    Post pain: adequate analgesia    Post assessment: no apparent anesthetic complications    Post vital signs: stable    Level of consciousness: awake    Nausea/Vomiting: no nausea/vomiting    Complications: none    Transfer of care protocol was followed      Last vitals: Visit Vitals  BP (!) 156/73 (BP Location: Left arm, Patient Position: Lying)   Pulse 70   Temp 36.6 °C (97.9 °F) (Temporal)   Resp 18   Ht 5' 11" (1.803 m)   Wt 64.4 kg (141 lb 15.6 oz)   SpO2 100%   Breastfeeding No   BMI 19.80 kg/m²     "

## 2024-08-28 NOTE — ASSESSMENT & PLAN NOTE
Patient followed by Urology and patient reports since last cystoscopy and treatment by Urology no further issues with UTIs or incontinence. U/A on admit with no signs of infection.

## 2024-08-29 PROBLEM — D62 POSTOPERATIVE ANEMIA DUE TO ACUTE BLOOD LOSS: Status: ACTIVE | Noted: 2024-08-29

## 2024-08-29 LAB
ANION GAP SERPL CALC-SCNC: 7 MMOL/L (ref 8–16)
BASOPHILS # BLD AUTO: 0.02 K/UL (ref 0–0.2)
BASOPHILS NFR BLD: 0.2 % (ref 0–1.9)
BUN SERPL-MCNC: 17 MG/DL (ref 8–23)
CALCIUM SERPL-MCNC: 7.7 MG/DL (ref 8.7–10.5)
CHLORIDE SERPL-SCNC: 105 MMOL/L (ref 95–110)
CO2 SERPL-SCNC: 24 MMOL/L (ref 23–29)
CREAT SERPL-MCNC: 0.7 MG/DL (ref 0.5–1.4)
DIFFERENTIAL METHOD BLD: ABNORMAL
EOSINOPHIL # BLD AUTO: 0 K/UL (ref 0–0.5)
EOSINOPHIL NFR BLD: 0.1 % (ref 0–8)
ERYTHROCYTE [DISTWIDTH] IN BLOOD BY AUTOMATED COUNT: 13 % (ref 11.5–14.5)
EST. GFR  (NO RACE VARIABLE): >60 ML/MIN/1.73 M^2
GLUCOSE SERPL-MCNC: 138 MG/DL (ref 70–110)
HCT VFR BLD AUTO: 30.8 % (ref 37–48.5)
HGB BLD-MCNC: 10.5 G/DL (ref 12–16)
IMM GRANULOCYTES # BLD AUTO: 0.02 K/UL (ref 0–0.04)
IMM GRANULOCYTES NFR BLD AUTO: 0.2 % (ref 0–0.5)
LYMPHOCYTES # BLD AUTO: 0.7 K/UL (ref 1–4.8)
LYMPHOCYTES NFR BLD: 8.4 % (ref 18–48)
MAGNESIUM SERPL-MCNC: 1.9 MG/DL (ref 1.6–2.6)
MCH RBC QN AUTO: 30.8 PG (ref 27–31)
MCHC RBC AUTO-ENTMCNC: 34.1 G/DL (ref 32–36)
MCV RBC AUTO: 90 FL (ref 82–98)
MONOCYTES # BLD AUTO: 0.8 K/UL (ref 0.3–1)
MONOCYTES NFR BLD: 9.4 % (ref 4–15)
NEUTROPHILS # BLD AUTO: 6.7 K/UL (ref 1.8–7.7)
NEUTROPHILS NFR BLD: 81.7 % (ref 38–73)
NRBC BLD-RTO: 0 /100 WBC
PHOSPHATE SERPL-MCNC: 3.1 MG/DL (ref 2.7–4.5)
PLATELET # BLD AUTO: 161 K/UL (ref 150–450)
PMV BLD AUTO: 10.7 FL (ref 9.2–12.9)
POTASSIUM SERPL-SCNC: 3.9 MMOL/L (ref 3.5–5.1)
RBC # BLD AUTO: 3.41 M/UL (ref 4–5.4)
SODIUM SERPL-SCNC: 136 MMOL/L (ref 136–145)
WBC # BLD AUTO: 8.19 K/UL (ref 3.9–12.7)

## 2024-08-29 PROCEDURE — 83735 ASSAY OF MAGNESIUM: CPT | Performed by: INTERNAL MEDICINE

## 2024-08-29 PROCEDURE — 80048 BASIC METABOLIC PNL TOTAL CA: CPT | Performed by: INTERNAL MEDICINE

## 2024-08-29 PROCEDURE — 36415 COLL VENOUS BLD VENIPUNCTURE: CPT | Performed by: INTERNAL MEDICINE

## 2024-08-29 PROCEDURE — 97165 OT EVAL LOW COMPLEX 30 MIN: CPT

## 2024-08-29 PROCEDURE — 63600175 PHARM REV CODE 636 W HCPCS: Performed by: INTERNAL MEDICINE

## 2024-08-29 PROCEDURE — 25000003 PHARM REV CODE 250

## 2024-08-29 PROCEDURE — 97161 PT EVAL LOW COMPLEX 20 MIN: CPT

## 2024-08-29 PROCEDURE — 97530 THERAPEUTIC ACTIVITIES: CPT

## 2024-08-29 PROCEDURE — 21400001 HC TELEMETRY ROOM

## 2024-08-29 PROCEDURE — 25000003 PHARM REV CODE 250: Performed by: EMERGENCY MEDICINE

## 2024-08-29 PROCEDURE — 85025 COMPLETE CBC W/AUTO DIFF WBC: CPT | Performed by: INTERNAL MEDICINE

## 2024-08-29 PROCEDURE — 25000003 PHARM REV CODE 250: Performed by: INTERNAL MEDICINE

## 2024-08-29 PROCEDURE — 97535 SELF CARE MNGMENT TRAINING: CPT

## 2024-08-29 PROCEDURE — 97116 GAIT TRAINING THERAPY: CPT

## 2024-08-29 PROCEDURE — 84100 ASSAY OF PHOSPHORUS: CPT | Performed by: INTERNAL MEDICINE

## 2024-08-29 RX ORDER — METHOCARBAMOL 500 MG/1
500 TABLET, FILM COATED ORAL EVERY 6 HOURS
Qty: 40 TABLET | Refills: 0 | Status: SHIPPED | OUTPATIENT
Start: 2024-08-29 | End: 2024-09-09

## 2024-08-29 RX ORDER — ACETAMINOPHEN 500 MG
1000 TABLET ORAL EVERY 8 HOURS
Qty: 60 TABLET | Refills: 0 | Status: SHIPPED | OUTPATIENT
Start: 2024-08-29 | End: 2024-09-09

## 2024-08-29 RX ADMIN — ACETAMINOPHEN 1000 MG: 500 TABLET ORAL at 09:08

## 2024-08-29 RX ADMIN — SENNOSIDES AND DOCUSATE SODIUM 1 TABLET: 50; 8.6 TABLET ORAL at 09:08

## 2024-08-29 RX ADMIN — ACETAMINOPHEN 1000 MG: 500 TABLET ORAL at 01:08

## 2024-08-29 RX ADMIN — Medication 6 MG: at 09:08

## 2024-08-29 RX ADMIN — METHOCARBAMOL 500 MG: 500 TABLET ORAL at 09:08

## 2024-08-29 RX ADMIN — APIXABAN 2.5 MG: 2.5 TABLET, FILM COATED ORAL at 09:08

## 2024-08-29 RX ADMIN — METHOCARBAMOL 500 MG: 500 TABLET ORAL at 01:08

## 2024-08-29 RX ADMIN — MUPIROCIN 1 G: 20 OINTMENT TOPICAL at 09:08

## 2024-08-29 RX ADMIN — CEFAZOLIN 2 G: 2 INJECTION, POWDER, FOR SOLUTION INTRAMUSCULAR; INTRAVENOUS at 05:08

## 2024-08-29 RX ADMIN — METHOCARBAMOL 500 MG: 500 TABLET ORAL at 02:08

## 2024-08-29 RX ADMIN — CHOLECALCIFEROL TAB 25 MCG (1000 UNIT) 2000 UNITS: 25 TAB at 09:08

## 2024-08-29 RX ADMIN — POLYETHYLENE GLYCOL 3350 17 G: 17 POWDER, FOR SOLUTION ORAL at 09:08

## 2024-08-29 RX ADMIN — OXYCODONE HYDROCHLORIDE AND ACETAMINOPHEN 500 MG: 500 TABLET ORAL at 09:08

## 2024-08-29 RX ADMIN — Medication 2 CAPSULE: at 09:08

## 2024-08-29 NOTE — NURSING
Nurses Note -- 4 Eyes      8/28/2024   10:30 PM      Skin assessed during: Q Shift Change      [x] No Altered Skin Integrity Present    []Prevention Measures Documented      [] Yes- Altered Skin Integrity Present or Discovered   [] LDA Added if Not in Epic (Describe Wound)   [] New Altered Skin Integrity was Present on Admit and Documented in LDA   [] Wound Image Taken    Wound Care Consulted? No    Attending Nurse:  JASMINE Franco    Second RN/Staff Member:  JASMINE Shah

## 2024-08-29 NOTE — ANESTHESIA POSTPROCEDURE EVALUATION
Anesthesia Post Evaluation    Patient: Martha Mcfarlane    Procedure(s) Performed: Procedure(s) (LRB):  ARTHROPLASTY, HIP (Right)    Final Anesthesia Type: general      Patient location during evaluation: PACU  Patient participation: Yes- Able to Participate  Level of consciousness: awake and alert  Post-procedure vital signs: reviewed and stable  Pain management: adequate  Airway patency: patent    PONV status at discharge: No PONV  Anesthetic complications: no      Cardiovascular status: blood pressure returned to baseline  Respiratory status: unassisted  Hydration status: euvolemic  Follow-up not needed.          Vitals Value Taken Time   /76 08/29/24 1136   Temp 37 °C (98.6 °F) 08/29/24 1136   Pulse 83 08/29/24 1136   Resp 16 08/29/24 1136   SpO2 99 % 08/29/24 1136         Event Time   Out of Recovery 08/28/2024 18:15:00         Pain/Nancy Score: Pain Rating Prior to Med Admin: 3 (8/29/2024  9:35 AM)  Pain Rating Post Med Admin: 3 (8/29/2024 10:35 AM)  Nancy Score: 9 (8/28/2024  6:15 PM)

## 2024-08-29 NOTE — PT/OT/SLP EVAL
Physical Therapy Evaluation and Treatment    Patient Name: Martha Mcfarlane   MRN: [id number]  Recent Surgery: Procedure(s) (LRB):  ARTHROPLASTY, HIP (Right) 1 Day Post-Op    Recommendations:     Discharge Recommendations: Low Intensity Therapy   Discharge Equipment Recommendations: bedside commode, walker, rolling, hip kit   Barriers to discharge: None    Assessment:     Martha Mcfarlane is a 69 y.o. female admitted with a medical diagnosis of Closed displaced fracture of right femoral neck. She presents with the following impairments/functional limitations: weakness, impaired functional mobility, gait instability, impaired balance, decreased lower extremity function, pain, orthopedic precautions. Patient tolerated PT session well. Patient ambulated 50ft with RW and contact guard assistance. No LOB or SOB noted. Patient educated in posterior hip precautions and handout provided. She is okay to ambulate with RW and 1 person assistance.     Rehab Prognosis: Good; patient would benefit from acute PT services to address these deficits and reach maximum level of function.    Plan:     During this hospitalization, patient to be seen 4 x/week (hip pathway 8/30 and 8/31 then decrease to 4x's/week) to address the above listed problems via gait training, therapeutic activities, therapeutic exercises, neuromuscular re-education    Plan of Care Expires: 09/29/24    Subjective     Chief Complaint: Right hip pain.   Patient Comments/Goals: To get back to active lifestyle including cutting grass and traveling.   Pain/Comfort:  Pain Rating 1: 3/10  Location - Side 1: Right  Location 1: hip  Pain Addressed 1: Pre-medicate for activity, Reposition, Distraction  Pain Rating Post-Intervention 1: 3/10    Social History:  Living Environment: Patient lives with her  in a [facility] with 1 step to enter.   Prior Level of Function: Prior to admission, patient was independent for functional mobility.   Equipment Used at Home: none  Assistance Upon  Discharge:      Objective:     Communicated with RN prior to session. Patient found supine with telemetry, perineural catheter, woodward catheter, FCD upon PT entry to room.    General Precautions: Standard, fall   Orthopedic Precautions: RLE weight bearing as tolerated, RLE posterior precautions   Braces: N/A    Respiratory Status: Room air    Exams:  RLE ROM:  WFL within posterior hip precautions  RLE Strength:  appears WFL but did not formally assess due to pain and recent surgery  LLE ROM: WFL  LLE Strength: WFL  Cognitive: Patient is oriented to Person, Place, Time, Situation    Functional Mobility:  Gait belt applied - Yes  Bed Mobility  Scooting: contact guard assistance  Supine to Sit: contact guard assistance  Transfers  Sit to Stand: contact guard assistance with rolling walker x1 from bed   Gait  Patient ambulated 50ft with rolling walker and contact guard assistance. Patient required cues for position in walker, sequencing, and weight bearing status to increase independence and safety.     Therapeutic Activities and Exercises:  Patient educated in:  -PT role and POC  -posterior hip precautions with handout provided   -safety with transfers including hand placement  -gait sequencing and RW management  -OOB activity to maximize recovery including ambulating with nursing staff assistance and RW while in the hospital    AM-PAC 6 CLICK MOBILITY  Total Score:18    Patient left up in chair with all lines intact, call button in reach, and RN notified.    GOALS:   Multidisciplinary Problems       Physical Therapy Goals          Problem: Physical Therapy    Goal Priority Disciplines Outcome Goal Variances Interventions   Physical Therapy Goal     PT, PT/OT Progressing     Description: Goals to be met by: 2024     Patient will increase functional independence with mobility by performin. Supine to sit with supervision  2. Sit to stand transfer with Supervision  3. Gait x300 feet with Supervision  using Rolling Walker  4. Ascend/Descend 1 curb step with Stand by assistance using Rolling Walker  5. Lower extremity exercise program x30 reps per handout, with supervision  6. Patient able to recall 3/3 posterior hip precautions with independence    DME Justifications:    Bedside Commode- Patient has a mobility limitation that significantly impairs their ability to participate in one or more mobility related activities of daily living, including toileting. This deficit can be resolved by using a bedside commode. Patient demonstrates mobility limitations that will cause them to be confined to one room at home without bathroom access for up to 30 days. Using a bedside commode will greatly improve the patient's ability to participate in MRADLs.     Rolling Walker- Patient demonstrates a mobility limitation that significantly impairs their ability to participate in one or more mobility related activities of daily living. Patient's mobility limitation cannot be sufficiently resolved with the use of a cane, but can be sufficiently resolved with the use of a rolling walker.The use of a rolling walker will considerably improve their ability to participate in MRADLs. Patient will use the walker on a regular basis at home.                                    History:     Past Medical History:   Diagnosis Date    Age-related osteoporosis with current pathological fracture     Aortic calcification 04/21/2023    Seen on X-ray chest 04/03/2020      Welcome-Walker grade 1 cystocele 03/07/2024    Constipation - functional     Encounter for blood transfusion     History of COVID-19 10/27/2021    Kidney calculi     Melanoma     Mitral valve prolapse     Mixed stress and urge urinary incontinence 03/07/2024    Osteoporosis, unspecified     Primary insomnia 01/20/2016    Stroke     tia    Tenosynovitis, de Quervain 10/06/2021       Past Surgical History:   Procedure Laterality Date    APPENDECTOMY      COLON SURGERY      COLONOSCOPY       COLONOSCOPY N/A 12/15/2015    Procedure: COLONOSCOPY;  Surgeon: ADALBERTO Liao MD;  Location: [facility] (4TH FLR);  Service: Endoscopy;  Laterality: N/A;    CYSTOSCOPY WITH CYSTOMETROGRAPHY N/A 3/7/2024    Procedure: CYSTOSCOPY, WITH CYSTOMETROGRAM;  Surgeon: Jabari Hager MD;  Location: [facility];  Service: Urology;  Laterality: N/A;    CYSTOSCOPY WITH URODYNAMIC TESTING N/A 3/7/2024    Procedure: CYSTOSCOPY, WITH URODYNAMIC TESTING;  Surgeon: Jabari Hager MD;  Location: [facility];  Service: Urology;  Laterality: N/A;    DE QUERVAIN'S RELEASE Right 10/6/2021    Procedure: RELEASE, HAND, FOR DEQUERVAIN'S TENOSYNOVITIS - right;  Surgeon: Aric Leonard MD;  Location: [facility];  Service: Orthopedics;  Laterality: Right;    ELECTROMYOGRAPHY N/A 3/7/2024    Procedure: EMG (ELECTROMYOGRAPHY);  Surgeon: Jabari Hager MD;  Location: [facility];  Service: Urology;  Laterality: N/A;    ESOPHAGOGASTRODUODENOSCOPY N/A 4/8/2020    Procedure: EGD (ESOPHAGOGASTRODUODENOSCOPY);  Surgeon: Jonathan Roth MD;  Location: [facility] (2ND FLR);  Service: Endoscopy;  Laterality: N/A;  check stat hct before     labs will be drawn prior-GT    HYSTERECTOMY      INTUSSUSCEPTION REPAIR      LASIK      LEFT COLECTOMY      melanoma on face removal      PARTIAL HYSTERECTOMY      UROFLOWMETRY N/A 3/7/2024    Procedure: UROFLOWMETRY;  Surgeon: Jabari Hager MD;  Location: [facility];  Service: Urology;  Laterality: N/A;    VOIDING URETHROCYSTOGRAPHY N/A 3/7/2024    Procedure: CYSTOURETHROGRAM, VOIDING;  Surgeon: Jabari Hager MD;  Location: [facility];  Service: Urology;  Laterality: N/A;       Time Tracking:     PT Received On: 08/29/24  PT Start Time: 0957  PT Stop Time: 1028  PT Total Time (min): 31 min     Billable Minutes: Evaluation 8, Gait Training 13, and Therapeutic Activity 10    Co-treatment performed for this visit due to patient need for two skilled therapists to ensure patient and staff safety and to accommodate for  patient activity tolerance/pain management.    8/29/2024

## 2024-08-29 NOTE — ANESTHESIA POST-OP PAIN MANAGEMENT
Acute Pain Service Progress Note    Martha Mcfarlane is a 69 y.o., female, [id number].    Surgery:  ARTHROPLASTY, HIP (Right: Hip)     Post Op Day #: 1    Catheter type: perineural  SIFI    Infusion type: Ropivacaine 0.2%  15cc q3h basal    Problem List:    Active Hospital Problems    Diagnosis  POA    *Closed displaced fracture of right femoral neck [S72.001A]  Yes    Mixed stress and urge urinary incontinence [N39.46]  Yes    Primary insomnia [F51.01]  Yes    Age-related osteoporosis with current pathological fracture [M80.00XA]  Yes      Resolved Hospital Problems   No resolved problems to display.       Subjective:     General appearance of alert, oriented, no complaints   Pain with rest: 3    Faces   Pain with movement: 6    Faces   Side Effects    1. Pruritis No    2. Nausea No    3. Motor Blockade No, 0=Ability to raise lower extremities off bed    4. Sedation No, 1=awake and alert    Objective:     Catheter site clean, dry, intact        Vitals   Vitals:    08/29/24 0507   BP: (!) 112/55   Pulse: 76   Resp: 18   Temp: 36.9 °C (98.5 °F)        Labs    Admission on 08/27/2024   Component Date Value Ref Range Status    WBC 08/27/2024 11.27  3.90 - 12.70 K/uL Final    RBC 08/27/2024 4.50  4.00 - 5.40 M/uL Final    Hemoglobin 08/27/2024 13.2  12.0 - 16.0 g/dL Final    Hematocrit 08/27/2024 40.4  37.0 - 48.5 % Final    MCV 08/27/2024 90  82 - 98 fL Final    MCH 08/27/2024 29.3  27.0 - 31.0 pg Final    MCHC 08/27/2024 32.7  32.0 - 36.0 g/dL Final    RDW 08/27/2024 12.8  11.5 - 14.5 % Final    Platelets 08/27/2024 158  150 - 450 K/uL Final    MPV 08/27/2024 10.2  9.2 - 12.9 fL Final    Immature Granulocytes 08/27/2024 0.8 (H)  0.0 - 0.5 % Final    Gran # (ANC) 08/27/2024 10.2 (H)  1.8 - 7.7 K/uL Final    Immature Grans (Abs) 08/27/2024 0.09 (H)  0.00 - 0.04 K/uL Final    Lymph # 08/27/2024 0.6 (L)  1.0 - 4.8 K/uL Final    Mono # 08/27/2024 0.4  0.3 - 1.0 K/uL Final    Eos # 08/27/2024 0.0  0.0 - 0.5 K/uL Final    Baso #  08/27/2024 0.03  0.00 - 0.20 K/uL Final    nRBC 08/27/2024 0  0 /100 WBC Final    Gran % 08/27/2024 90.7 (H)  38.0 - 73.0 % Final    Lymph % 08/27/2024 5.0 (L)  18.0 - 48.0 % Final    Mono % 08/27/2024 3.1 (L)  4.0 - 15.0 % Final    Eosinophil % 08/27/2024 0.1  0.0 - 8.0 % Final    Basophil % 08/27/2024 0.3  0.0 - 1.9 % Final    Differential Method 08/27/2024 Automated   Final    Sodium 08/27/2024 138  136 - 145 mmol/L Final    Potassium 08/27/2024 3.7  3.5 - 5.1 mmol/L Final    Chloride 08/27/2024 102  95 - 110 mmol/L Final    CO2 08/27/2024 26  23 - 29 mmol/L Final    Glucose 08/27/2024 101  70 - 110 mg/dL Final    BUN 08/27/2024 19  8 - 23 mg/dL Final    Creatinine 08/27/2024 0.7  0.5 - 1.4 mg/dL Final    Calcium 08/27/2024 9.6  8.7 - 10.5 mg/dL Final    Total Protein 08/27/2024 7.5  6.0 - 8.4 g/dL Final    Albumin 08/27/2024 4.5  3.5 - 5.2 g/dL Final    Total Bilirubin 08/27/2024 0.8  0.1 - 1.0 mg/dL Final    Alkaline Phosphatase 08/27/2024 106  55 - 135 U/L Final    AST 08/27/2024 24  10 - 40 U/L Final    ALT 08/27/2024 20  10 - 44 U/L Final    eGFR 08/27/2024 >60.0  >60 mL/min/1.73 m^2 Final    Anion Gap 08/27/2024 10  8 - 16 mmol/L Final    QRS Duration 08/27/2024 100  ms Final    [facility]HS QTC Calculation 08/27/2024 446  ms Final    Group & Rh 08/27/2024 B POS   Final    Indirect Krystle 08/27/2024 NEG   Final    Specimen Outdate 08/27/2024 08/30/2024 23:59   Final    Specimen UA 08/27/2024 Urine, Clean Catch   Final    Color, UA 08/27/2024 Yellow  Yellow, Straw, Kajal Final    Appearance, UA 08/27/2024 Clear  Clear Final    pH, UA 08/27/2024 7.0  5.0 - 8.0 Final    Specific Gravity, UA 08/27/2024 1.015  1.005 - 1.030 Final    Protein, UA 08/27/2024 Negative  Negative Final    Glucose, UA 08/27/2024 Negative  Negative Final    Ketones, UA 08/27/2024 2+ (A)  Negative Final    Bilirubin (UA) 08/27/2024 Negative  Negative Final    Occult Blood UA 08/27/2024 Negative  Negative Final    Nitrite, UA 08/27/2024 Negative   Negative Final    Leukocytes, UA 08/27/2024 Negative  Negative Final    Hemoglobin A1C 08/27/2024 5.0  4.0 - 5.6 % Final    Estimated Avg Glucose 08/27/2024 97  68 - 131 mg/dL Final    Magnesium 08/27/2024 1.9  1.6 - 2.6 mg/dL Final    Phosphorus 08/27/2024 3.5  2.7 - 4.5 mg/dL Final    Prealbumin 08/27/2024 17 (L)  20 - 43 mg/dL Final    Prothrombin Time 08/27/2024 11.1  9.0 - 12.5 sec Final    INR 08/27/2024 1.0  0.8 - 1.2 Final    Transferrin 08/27/2024 234  200 - 375 mg/dL Final    Vit D, 25-Hydroxy 08/27/2024 32  30 - 96 ng/mL Final    UNIT NUMBER 08/27/2024 [id number]   Preliminary    Product Code 08/27/2024 [id number]   Preliminary    DISPENSE STATUS 08/27/2024 CROSSMATCHED   Preliminary    CODING SYSTEM 08/27/2024 [id number]   Preliminary    Unit Blood Type Code 08/27/2024 7300   Preliminary    Unit Blood Type 08/27/2024 B POS   Preliminary    Unit Expiration 08/27/2024 [id number]   Preliminary    CROSSMATCH INTERPRETATION 08/27/2024 Compatible   Preliminary    UNIT NUMBER 08/27/2024 [id number]   Preliminary    Product Code 08/27/2024 [id number]   Preliminary    DISPENSE STATUS 08/27/2024 CROSSMATCHED   Preliminary    CODING SYSTEM 08/27/2024 [id number]   Preliminary    Unit Blood Type Code 08/27/2024 7300   Preliminary    Unit Blood Type 08/27/2024 B POS   Preliminary    Unit Expiration 08/27/2024 [id number]   Preliminary    CROSSMATCH INTERPRETATION 08/27/2024 Compatible   Preliminary    WBC 08/28/2024 5.53  3.90 - 12.70 K/uL Final    RBC 08/28/2024 4.00  4.00 - 5.40 M/uL Final    Hemoglobin 08/28/2024 11.5 (L)  12.0 - 16.0 g/dL Final    Hematocrit 08/28/2024 35.8 (L)  37.0 - 48.5 % Final    MCV 08/28/2024 90  82 - 98 fL Final    MCH 08/28/2024 28.8  27.0 - 31.0 pg Final    MCHC 08/28/2024 32.1  32.0 - 36.0 g/dL Final    RDW 08/28/2024 12.7  11.5 - 14.5 % Final    Platelets 08/28/2024 145 (L)  150 - 450 K/uL Final    MPV 08/28/2024 10.2  9.2 - 12.9 fL Final    Immature Granulocytes 08/28/2024 0.2  0.0 -  0.5 % Final    Gran # (ANC) 08/28/2024 4.2  1.8 - 7.7 K/uL Final    Immature Grans (Abs) 08/28/2024 0.01  0.00 - 0.04 K/uL Final    Lymph # 08/28/2024 0.8 (L)  1.0 - 4.8 K/uL Final    Mono # 08/28/2024 0.5  0.3 - 1.0 K/uL Final    Eos # 08/28/2024 0.1  0.0 - 0.5 K/uL Final    Baso # 08/28/2024 0.03  0.00 - 0.20 K/uL Final    nRBC 08/28/2024 0  0 /100 WBC Final    Gran % 08/28/2024 75.4 (H)  38.0 - 73.0 % Final    Lymph % 08/28/2024 14.1 (L)  18.0 - 48.0 % Final    Mono % 08/28/2024 8.9  4.0 - 15.0 % Final    Eosinophil % 08/28/2024 0.9  0.0 - 8.0 % Final    Basophil % 08/28/2024 0.5  0.0 - 1.9 % Final    Differential Method 08/28/2024 Automated   Final    Sodium 08/28/2024 137  136 - 145 mmol/L Final    Potassium 08/28/2024 3.9  3.5 - 5.1 mmol/L Final    Chloride 08/28/2024 104  95 - 110 mmol/L Final    CO2 08/28/2024 26  23 - 29 mmol/L Final    Glucose 08/28/2024 96  70 - 110 mg/dL Final    BUN 08/28/2024 16  8 - 23 mg/dL Final    Creatinine 08/28/2024 0.7  0.5 - 1.4 mg/dL Final    Calcium 08/28/2024 8.8  8.7 - 10.5 mg/dL Final    Anion Gap 08/28/2024 7 (L)  8 - 16 mmol/L Final    eGFR 08/28/2024 >60.0  >60 mL/min/1.73 m^2 Final    Magnesium 08/28/2024 2.1  1.6 - 2.6 mg/dL Final    Phosphorus 08/28/2024 3.5  2.7 - 4.5 mg/dL Final    WBC 08/29/2024 8.19  3.90 - 12.70 K/uL Final    RBC 08/29/2024 3.41 (L)  4.00 - 5.40 M/uL Final    Hemoglobin 08/29/2024 10.5 (L)  12.0 - 16.0 g/dL Final    Hematocrit 08/29/2024 30.8 (L)  37.0 - 48.5 % Final    MCV 08/29/2024 90  82 - 98 fL Final    MCH 08/29/2024 30.8  27.0 - 31.0 pg Final    MCHC 08/29/2024 34.1  32.0 - 36.0 g/dL Final    RDW 08/29/2024 13.0  11.5 - 14.5 % Final    Platelets 08/29/2024 161  150 - 450 K/uL Final    MPV 08/29/2024 10.7  9.2 - 12.9 fL Final    Immature Granulocytes 08/29/2024 0.2  0.0 - 0.5 % Final    Gran # (ANC) 08/29/2024 6.7  1.8 - 7.7 K/uL Final    Immature Grans (Abs) 08/29/2024 0.02  0.00 - 0.04 K/uL Final    Lymph # 08/29/2024 0.7 (L)  1.0 - 4.8  K/uL Final    Mono # 08/29/2024 0.8  0.3 - 1.0 K/uL Final    Eos # 08/29/2024 0.0  0.0 - 0.5 K/uL Final    Baso # 08/29/2024 0.02  0.00 - 0.20 K/uL Final    nRBC 08/29/2024 0  0 /100 WBC Final    Gran % 08/29/2024 81.7 (H)  38.0 - 73.0 % Final    Lymph % 08/29/2024 8.4 (L)  18.0 - 48.0 % Final    Mono % 08/29/2024 9.4  4.0 - 15.0 % Final    Eosinophil % 08/29/2024 0.1  0.0 - 8.0 % Final    Basophil % 08/29/2024 0.2  0.0 - 1.9 % Final    Differential Method 08/29/2024 Automated   Final    Sodium 08/29/2024 136  136 - 145 mmol/L Final    Potassium 08/29/2024 3.9  3.5 - 5.1 mmol/L Final    Chloride 08/29/2024 105  95 - 110 mmol/L Final    CO2 08/29/2024 24  23 - 29 mmol/L Final    Glucose 08/29/2024 138 (H)  70 - 110 mg/dL Final    BUN 08/29/2024 17  8 - 23 mg/dL Final    Creatinine 08/29/2024 0.7  0.5 - 1.4 mg/dL Final    Calcium 08/29/2024 7.7 (L)  8.7 - 10.5 mg/dL Final    Anion Gap 08/29/2024 7 (L)  8 - 16 mmol/L Final    eGFR 08/29/2024 >60.0  >60 mL/min/1.73 m^2 Final    Magnesium 08/29/2024 1.9  1.6 - 2.6 mg/dL Final    Phosphorus 08/29/2024 3.1  2.7 - 4.5 mg/dL Final        Meds   Current Facility-Administered Medications   Medication Dose Route Frequency Provider Last Rate Last Admin    acetaminophen tablet 1,000 mg  1,000 mg Oral Q6H Beverly Carvajal MD   1,000 mg at 08/29/24 0139    apixaban tablet 2.5 mg  2.5 mg Oral BID Adrián Kennedy MD   2.5 mg at 08/28/24 2105    ascorbic acid (vitamin C) tablet 500 mg  500 mg Oral Daily Beverly Carvajal MD   500 mg at 08/28/24 0835    bisacodyL suppository 10 mg  10 mg Rectal Daily PRN Beverly Carvajal MD        melatonin tablet 6 mg  6 mg Oral Nightly PRN Ludmila Licea MD   6 mg at 08/27/24 2209    methocarbamoL tablet 500 mg  500 mg Oral Q6H Shelley Liu MD   500 mg at 08/29/24 0139    mupirocin 2 % ointment 1 g  1 g Nasal BID Beverly Carvajal MD        omega 3-dha-epa-fish oil capsule 2 capsule  2 capsule Oral Daily Beverly Carvajal MD    2 capsule at 08/28/24 0835    ondansetron injection 4 mg  4 mg Intravenous Q12H PRN Beverly Carvajal MD        oxyCODONE immediate release tablet 5 mg  5 mg Oral Q4H PRN Shelley Liu MD   5 mg at 08/28/24 1814    polyethylene glycol packet 17 g  17 g Oral Daily Beverly Carvajal MD   17 g at 08/28/24 0836    ropivacaine 0.2% Perineural Pump infusion 500 ML   Perineural Continuous [place] Daily DO New Bag at 08/28/24 1852    senna-docusate 8.6-50 mg per tablet 1 tablet  1 tablet Oral BID Beverly Carvajal MD   1 tablet at 08/28/24 2104    sodium chloride 0.9% flush 10 mL  10 mL Intravenous PRN Beverly Carvajal MD        vitamin D 1000 units tablet 2,000 Units  2,000 Units Oral Daily Beverly Carvajal MD   2,000 Units at 08/28/24 0835    zolpidem tablet 5 mg  5 mg Oral Nightly PRN Beverly Carvajal MD               Assessment:     Pain control adequate    Plan:    1) Continue SIFI PNC at current infusion rate  2) Continue multimodals including Tylenol, Robaxin  3) No PRN requirements currently

## 2024-08-29 NOTE — SUBJECTIVE & OBJECTIVE
Interval History: No acute events overnight.  Endorses constipation today.  Says pain is controlled.  Drop in Hgb noted - postop, and expected.  PNC in place.  Waiting to work with PT/OT but anticipate DC home with HH.    Review of Systems   Constitutional:  Negative for chills, fatigue and fever.   Respiratory:  Negative for cough and shortness of breath.    Cardiovascular:  Negative for chest pain, palpitations and leg swelling.   Gastrointestinal:  Positive for constipation. Negative for abdominal pain, diarrhea, nausea and vomiting.   Genitourinary:  Negative for dysuria and urgency.   Musculoskeletal:  Positive for arthralgias and myalgias.   Neurological:  Negative for dizziness and headaches.   All other systems reviewed and are negative.    Objective:     Vital Signs (Most Recent):  Temp: 98.7 °F (37.1 °C) (08/29/24 0748)  Pulse: 82 (08/29/24 0748)  Resp: 18 (08/29/24 0748)  BP: 108/61 (08/29/24 0748)  SpO2: (!) 93 % (08/29/24 0748) Vital Signs (24h Range):  Temp:  [97.8 °F (36.6 °C)-98.7 °F (37.1 °C)] 98.7 °F (37.1 °C)  Pulse:  [65-82] 82  Resp:  [13-29] 18  SpO2:  [93 %-100 %] 93 %  BP: (108-193)/() 108/61     Weight: 63.5 kg (140 lb)  Body mass index is 19.25 kg/m².    Intake/Output Summary (Last 24 hours) at 8/29/2024 1029  Last data filed at 8/29/2024 0528  Gross per 24 hour   Intake 1700 ml   Output 2455 ml   Net -755 ml         Physical Exam  Constitutional:       Appearance: Normal appearance.   HENT:      Head: Normocephalic and atraumatic.   Cardiovascular:      Rate and Rhythm: Normal rate and regular rhythm.      Heart sounds: No murmur heard.  Pulmonary:      Effort: Pulmonary effort is normal. No respiratory distress.      Breath sounds: Normal breath sounds. No wheezing or rales.   Abdominal:      General: There is no distension.      Palpations: Abdomen is soft.      Tenderness: There is no abdominal tenderness.   Musculoskeletal:         General: No deformity.      Comments: Right  postop bandages C/D/I   Neurological:      General: No focal deficit present.      Mental Status: She is alert and oriented to person, place, and time. Mental status is at baseline.             Significant Labs: All pertinent labs within the past 24 hours have been reviewed.  CBC:   Recent Labs   Lab 08/27/24  1245 08/28/24 0444 08/29/24 0444   WBC 11.27 5.53 8.19   HGB 13.2 11.5* 10.5*   HCT 40.4 35.8* 30.8*    145* 161     CMP:   Recent Labs   Lab 08/27/24  1245 08/28/24  0444 08/29/24 0444    137 136   K 3.7 3.9 3.9    104 105   CO2 26 26 24    96 138*   BUN 19 16 17   CREATININE 0.7 0.7 0.7   CALCIUM 9.6 8.8 7.7*   PROT 7.5  --   --    ALBUMIN 4.5  --   --    BILITOT 0.8  --   --    ALKPHOS 106  --   --    AST 24  --   --    ALT 20  --   --    ANIONGAP 10 7* 7*       Significant Imaging: I have reviewed all pertinent imaging results/findings within the past 24 hours.

## 2024-08-29 NOTE — ASSESSMENT & PLAN NOTE
Martha Mcfarlane is a 69 y.o. female with right femoral neck fracture, closed, NVI. They take no anticoagulation at home. They required no ambulatory assistive devices prior to this injury.     S/p R RUTH 8/28    WBAT RLE with posterior hip precautions  Ancef x 24 hours  Eliquis 2.5 BID

## 2024-08-29 NOTE — PLAN OF CARE
Problem: Fall Injury Risk  Goal: Absence of Fall and Fall-Related Injury  Outcome: Progressing     Problem: Hip Fracture Medical Management  Goal: Optimal Coping with Change in Health Status  Outcome: Progressing     Problem: Adult Inpatient Plan of Care  Goal: Plan of Care Review  Outcome: Progressing

## 2024-08-29 NOTE — PLAN OF CARE
PT evaluation complete.     Problem: Physical Therapy  Goal: Physical Therapy Goal  Description: Goals to be met by: 2024     Patient will increase functional independence with mobility by performin. Supine to sit with supervision  2. Sit to stand transfer with Supervision  3. Gait x300 feet with Supervision using Rolling Walker  4. Ascend/Descend 1 curb step with Stand by assistance using Rolling Walker  5. Lower extremity exercise program x30 reps per handout, with supervision  6. Patient able to recall 3/3 posterior hip precautions with independence    DME Justifications:    Bedside Commode- Patient has a mobility limitation that significantly impairs their ability to participate in one or more mobility related activities of daily living, including toileting. This deficit can be resolved by using a bedside commode. Patient demonstrates mobility limitations that will cause them to be confined to one room at home without bathroom access for up to 30 days. Using a bedside commode will greatly improve the patient's ability to participate in MRADLs.     Rolling Walker- Patient demonstrates a mobility limitation that significantly impairs their ability to participate in one or more mobility related activities of daily living. Patient's mobility limitation cannot be sufficiently resolved with the use of a cane, but can be sufficiently resolved with the use of a rolling walker.The use of a rolling walker will considerably improve their ability to participate in MRADLs. Patient will use the walker on a regular basis at home.          2024 1301 by Briana Odom, PT  Outcome: Progressing

## 2024-08-29 NOTE — PROGRESS NOTES
"[facility] - Surgery  Orthopedics  Progress Note    Patient Name: Martha Mcfarlane  MRN: [id number]  Admission Date: 8/27/2024  Hospital Length of Stay: 2 days  Attending Provider: Cydney Nguyen MD  Primary Care Provider: Nas Mcneill MD  Follow-up For: Procedure(s) (LRB):  ARTHROPLASTY, HIP (Right)    Post-Operative Day: 1 Day Post-Op  Subjective:     Principal Problem:Closed displaced fracture of right femoral neck    Principal Orthopedic Problem: As above, s/p R RUTH 8/28    Interval History: Pt seen and examined at bedside. NAEON, VSS, AF. Pain controlled. Denies fevers, chills, chest pain, SOB, N/V/D.   Pending PT today. Hgb 10.5 this AM.      Review of patient's allergies indicates:   Allergen Reactions    Orange flavor      Severe headache, nausea    Iodine and iodide containing products     Shellfish containing products        Current Facility-Administered Medications   Medication    acetaminophen tablet 1,000 mg    apixaban tablet 2.5 mg    ascorbic acid (vitamin C) tablet 500 mg    bisacodyL suppository 10 mg    melatonin tablet 6 mg    methocarbamoL tablet 500 mg    mupirocin 2 % ointment 1 g    omega 3-dha-epa-fish oil capsule 2 capsule    ondansetron injection 4 mg    oxyCODONE immediate release tablet 5 mg    polyethylene glycol packet 17 g    ropivacaine 0.2% Perineural Pump infusion 500 ML    senna-docusate 8.6-50 mg per tablet 1 tablet    sodium chloride 0.9% flush 10 mL    vitamin D 1000 units tablet 2,000 Units    zolpidem tablet 5 mg     Objective:     Vital Signs (Most Recent):  Temp: 98.7 °F (37.1 °C) (08/29/24 0748)  Pulse: 82 (08/29/24 0748)  Resp: 18 (08/29/24 0748)  BP: 108/61 (08/29/24 0748)  SpO2: (!) 93 % (08/29/24 0748) Vital Signs (24h Range):  Temp:  [97.8 °F (36.6 °C)-98.7 °F (37.1 °C)] 98.7 °F (37.1 °C)  Pulse:  [65-82] 82  Resp:  [13-29] 18  SpO2:  [93 %-100 %] 93 %  BP: (108-193)/() 108/61     Weight: 63.5 kg (140 lb)  Height: 5' 11.5" (181.6 cm)  Body mass index is 19.25 " kg/m².      Intake/Output Summary (Last 24 hours) at 8/29/2024 0836  Last data filed at 8/29/2024 0528  Gross per 24 hour   Intake 1700 ml   Output 2455 ml   Net -755 ml        Ortho/SPM Exam  A&O x 3  Regular Rate  Non-Labored Respirations    RLE:   Fires Quad/TA/EHL/GSC  SILT  Compartments soft  WWP, DP palpated  Dressing c/d/i       Significant Labs: CBC:   Recent Labs   Lab 08/27/24  1245 08/28/24 0444 08/29/24 0444   WBC 11.27 5.53 8.19   HGB 13.2 11.5* 10.5*   HCT 40.4 35.8* 30.8*    145* 161     CMP:   Recent Labs   Lab 08/27/24  1245 08/28/24 0444 08/29/24 0444    137 136   K 3.7 3.9 3.9    104 105   CO2 26 26 24    96 138*   BUN 19 16 17   CREATININE 0.7 0.7 0.7   CALCIUM 9.6 8.8 7.7*   PROT 7.5  --   --    ALBUMIN 4.5  --   --    BILITOT 0.8  --   --    ALKPHOS 106  --   --    AST 24  --   --    ALT 20  --   --    ANIONGAP 10 7* 7*     All pertinent labs within the past 24 hours have been reviewed.    Significant Imaging: I have reviewed and interpreted all pertinent imaging results/findings.  Assessment/Plan:     * Closed displaced fracture of right femoral neck  Martha Mcfarlane is a 69 y.o. female with right femoral neck fracture, closed, NVI. They take no anticoagulation at home. They required no ambulatory assistive devices prior to this injury.     S/p R RUTH 8/28    WBAT RLE with posterior hip precautions  Ancef x 24 hours  Eliquis 2.5 BID             Adrián Kennedy MD  Orthopedics  [facility] - Surgery

## 2024-08-29 NOTE — SUBJECTIVE & OBJECTIVE
"Principal Problem:Closed displaced fracture of right femoral neck    Principal Orthopedic Problem: As above, s/p R RUTH 8/28    Interval History: Pt seen and examined at bedside. JERALD, VSS, AF. Pain controlled. Denies fevers, chills, chest pain, SOB, N/V/D.   Pending PT today. Hgb 10.5 this AM.      Review of patient's allergies indicates:   Allergen Reactions    Orange flavor      Severe headache, nausea    Iodine and iodide containing products     Shellfish containing products        Current Facility-Administered Medications   Medication    acetaminophen tablet 1,000 mg    apixaban tablet 2.5 mg    ascorbic acid (vitamin C) tablet 500 mg    bisacodyL suppository 10 mg    melatonin tablet 6 mg    methocarbamoL tablet 500 mg    mupirocin 2 % ointment 1 g    omega 3-dha-epa-fish oil capsule 2 capsule    ondansetron injection 4 mg    oxyCODONE immediate release tablet 5 mg    polyethylene glycol packet 17 g    ropivacaine 0.2% Perineural Pump infusion 500 ML    senna-docusate 8.6-50 mg per tablet 1 tablet    sodium chloride 0.9% flush 10 mL    vitamin D 1000 units tablet 2,000 Units    zolpidem tablet 5 mg     Objective:     Vital Signs (Most Recent):  Temp: 98.7 °F (37.1 °C) (08/29/24 0748)  Pulse: 82 (08/29/24 0748)  Resp: 18 (08/29/24 0748)  BP: 108/61 (08/29/24 0748)  SpO2: (!) 93 % (08/29/24 0748) Vital Signs (24h Range):  Temp:  [97.8 °F (36.6 °C)-98.7 °F (37.1 °C)] 98.7 °F (37.1 °C)  Pulse:  [65-82] 82  Resp:  [13-29] 18  SpO2:  [93 %-100 %] 93 %  BP: (108-193)/() 108/61     Weight: 63.5 kg (140 lb)  Height: 5' 11.5" (181.6 cm)  Body mass index is 19.25 kg/m².      Intake/Output Summary (Last 24 hours) at 8/29/2024 0896  Last data filed at 8/29/2024 0528  Gross per 24 hour   Intake 1700 ml   Output 2455 ml   Net -755 ml        Ortho/SPM Exam  A&O x 3  Regular Rate  Non-Labored Respirations    RLE:   Fires Quad/TA/EHL/GSC  SILT  Compartments soft  WWP, DP palpated  Dressing c/d/i       Significant Labs: " CBC:   Recent Labs   Lab 08/27/24  1245 08/28/24  0444 08/29/24  0444   WBC 11.27 5.53 8.19   HGB 13.2 11.5* 10.5*   HCT 40.4 35.8* 30.8*    145* 161     CMP:   Recent Labs   Lab 08/27/24  1245 08/28/24  0444 08/29/24  0444    137 136   K 3.7 3.9 3.9    104 105   CO2 26 26 24    96 138*   BUN 19 16 17   CREATININE 0.7 0.7 0.7   CALCIUM 9.6 8.8 7.7*   PROT 7.5  --   --    ALBUMIN 4.5  --   --    BILITOT 0.8  --   --    ALKPHOS 106  --   --    AST 24  --   --    ALT 20  --   --    ANIONGAP 10 7* 7*     All pertinent labs within the past 24 hours have been reviewed.    Significant Imaging: I have reviewed and interpreted all pertinent imaging results/findings.

## 2024-08-29 NOTE — ASSESSMENT & PLAN NOTE
Patient with known osteoporosis and current right hip fracture related to osteoporosis.   Patient followed by Bob Solorio in Endocrine clinic for management of her osteoporosis and on Calcium and Vitamin D supplementation and recently started on Evenity injections for treatment of her osteoporosis. Per Dr. Nails previous osteoporosis treatment included:  Current regimen: Reclast 5 mg infusion  Prolia 60 mg due to severe constipation  Tried alendronate - last dose was 2014  Restarted alendronate 7/2017 but stopped it September 2017 due to blood pressure issues.

## 2024-08-29 NOTE — ASSESSMENT & PLAN NOTE
Patient admitted after trip and fall on her garden hose onto her patio striking her right hip and X-rays revealed closed subcapital right femur fracture.   Orthopedics consulted  POD1 from right hemiarthroplasty with Dr. Burnham on 8/28  Pain control as per Hip Fracture Pathway with multimodal pain regimen with scheduled Tylenol 1000 mg po every 8 hours, Robaxin 500 mg po 4 times daily and Lyrica 75 mg po nightly and oral Oxycodone IR 5-10 mg po every 4 hours prn for breakthrough pain.   Ortho suggested Eliquis 2.5mg BID x 4 weeks, end date 9/26  Anesthesia Pain managing Ropivacaine catheter  PT/OT consulted, anticipate DC home with HH

## 2024-08-29 NOTE — PLAN OF CARE
[facility]  [address]  [facility] [address]  Phone: [phone]    Home Health Orders  Face to Face Encounter    Patient Name: Martha Mcfarlane  YOB: 1955    PCP: Nas Mcneill MD   PCP Address: [address] [facility] [place] [address]  PCP Phone Number: [phone]  PCP Fax: [phone]    Encounter Date: 08/29/2024    Admit To Home Health    Diagnoses:  Active Hospital Problems    Diagnosis  POA    *Closed displaced fracture of right femoral neck s/p right hemiarthroplasty 8/28 [S72.001A]  Yes     Priority: 1 - High    Postoperative anemia due to acute blood loss [D62]  No    Mixed stress and urge urinary incontinence [N39.46]  Yes    Primary insomnia [F51.01]  Yes    Age-related osteoporosis with current pathological fracture [M80.00XA]  Yes      Resolved Hospital Problems   No resolved problems to display.       Follow Up Appointments:  Future Appointments   Date Time Provider Department Center   9/6/2024  9:30 AM Madina Joseph FNP [facility] PCW   9/11/2024  2:30 PM Angel Olvera, NP [facility]   9/23/2024  2:15 PM INJECTION NOM AMB INF [facility]   10/9/2024 10:45 AM Angel Olvera, NP [facility]THO [facility]   10/28/2024  1:00 PM Nas Mcneill MD [facility]           Allergies:  Review of patient's allergies indicates:   Allergen Reactions    Orange flavor      Severe headache, nausea    Iodine and iodide containing products     Shellfish containing products          Medications: Review discharge medications with patient and family and provide education.      Current Discharge Medication List        START taking these medications    Details   acetaminophen (TYLENOL) 500 MG tablet Take 2 tablets (1,000 mg total) by mouth every 8 (eight) hours. for 10 days  Qty: 60 tablet, Refills: 0      apixaban (ELIQUIS) 2.5 mg Tab Take 1 tablet (2.5 mg total) by mouth 2 (two) times daily.  Qty: 56 tablet,  Refills: 0      methocarbamoL (ROBAXIN) 500 MG Tab Take 1 tablet (500 mg total) by mouth every 6 (six) hours. for 10 days  Qty: 40 tablet, Refills: 0           CONTINUE these medications which have NOT CHANGED    Details   ascorbic acid, vitamin C, (VITAMIN C) 500 MG tablet Take 500 mg by mouth once daily.      CALCIUM CARBONATE/VITAMIN D3 (VITAMIN D-3 ORAL) Take 1 tablet by mouth Daily.      estradioL (ESTRACE) 0.01 % (0.1 mg/gram) vaginal cream Place 1 g vaginally every 7 days.      fish oil-omega-3 fatty acids 300-1,000 mg capsule Take 2 g by mouth once daily.      magnesium 30 mg Tab Take 1 tablet by mouth once daily.       polyethylene glycol (MIRALAX) 17 gram/dose powder Take 17 g by mouth once daily.      zolpidem (AMBIEN) 5 MG Tab Take 1 tablet (5 mg total) by mouth nightly as needed (insomnia).  Qty: 30 tablet, Refills: 1    Associated Diagnoses: Insomnia, unspecified type; Travel advice encounter      nystatin (MYCOSTATIN) cream Apply topically 2 (two) times daily. for 7 days  Qty: 30 g, Refills: 1    Associated Diagnoses: Vaginal irritation               I have seen and examined this patient within the last 30 days. My clinical findings that support the need for the home health skilled services and home bound status are the following:no   Weakness/numbness causing balance and gait disturbance due to Fracture making it taxing to leave home.       Code Status:    Code Status: Full Code      Nursing:   Agency to admit patient within 24 hours of hospital discharge unless specified on physician order or at patient request    SN to complete comprehensive assessment including routine vital signs. Instruct on disease process and s/s of complications to report to MD. Review/verify medication list sent home with the patient at time of discharge  and instruct patient/caregiver as needed. Frequency may be adjusted depending on start of care date.     Skilled nurse to perform up to 3 visits PRN for symptoms related to  diagnosis    Notify MD if SBP > 160 or < 90; DBP > 90 or < 50; HR > 120 or < 50; Temp > 101; O2 < 88%    Ok to schedule additional visits based on staff availability and patient request on consecutive days within the home health episode.      When multiple disciplines ordered:    Start of Care occurs on Sunday - Wednesday schedule remaining discipline evaluations as ordered on separate consecutive days following the start of care.    Thursday SOC -schedule subsequent evaluations Friday and Monday the following week.     Friday - Saturday SOC - schedule subsequent discipline evaluations on consecutive days starting Monday of the following week.      Diet:   regular diet      Activities:   WBAT RLE with posterior hip precautions       Home Health Aide:  Physical Therapy Three times weekly and Occupational Therapy Three times weekly      For all post-discharge communication and subsequent orders please contact patient's primary care physician. If unable to reach primary care physician or do not receive response within 30 minutes, please contact [facility] on call for clinical staff order clarification      I certify that this patient is confined to her home and needs intermittent skilled nursing care, physical therapy and occupational therapy.      Cydney Nguyen MD  [facility]

## 2024-08-29 NOTE — PROGRESS NOTES
Nursing Transfer Note      Reason patient is being transferred: Post procedure    Transfer To: [address] POSS    Transfer via bed    Transfer with cardiac monitoring TTX # [id number]    Transported by Transport    Medicines sent: None    Any special needs or follow-up needed: Routine    Chart send with patient: Yes    Notified: spouse    Patient reassessed at: 9375 8/28/2024

## 2024-08-29 NOTE — PLAN OF CARE
Problem: Adult Inpatient Plan of Care  Goal: Plan of Care Review  Outcome: Progressing  Goal: Patient-Specific Goal (Individualized)  Outcome: Progressing  Goal: Absence of Hospital-Acquired Illness or Injury  Outcome: Progressing  Goal: Optimal Comfort and Wellbeing  Outcome: Progressing  Goal: Readiness for Transition of Care  Outcome: Progressing     Problem: Hip Fracture Medical Management  Goal: Optimal Coping with Change in Health Status  Outcome: Progressing  Goal: Absence of Bleeding  Outcome: Progressing  Goal: Effective Bowel Elimination  Outcome: Progressing  Goal: Baseline Cognitive Function Maintained  Outcome: Progressing  Goal: Absence of Embolism  Outcome: Progressing  Goal: Fracture Stability  Outcome: Progressing  Goal: Optimal Functional Performance  Outcome: Progressing  Goal: Pain Control and Function  Outcome: Progressing  Goal: Effective Urinary Elimination  Outcome: Progressing     Problem: Surgery Nonspecified  Goal: Absence of Bleeding  Outcome: Progressing  Goal: Effective Bowel Elimination  Outcome: Progressing  Goal: Fluid and Electrolyte Balance  Outcome: Progressing  Goal: Blood Glucose Level Within Targeted Range  Outcome: Progressing  Goal: Absence of Infection Signs and Symptoms  Outcome: Progressing  Goal: Anesthesia/Sedation Recovery  Outcome: Progressing  Goal: Optimal Pain Control and Function  Outcome: Progressing  Goal: Nausea and Vomiting Relief  Outcome: Progressing  Goal: Effective Urinary Elimination  Outcome: Progressing  Goal: Effective Oxygenation and Ventilation  Outcome: Progressing     Problem: Anesthesia/Analgesia, Neuraxial  Goal: Safe, Effective Infusion Delivery  Outcome: Progressing  Goal: Absence of Infection Signs and Symptoms  Outcome: Progressing  Goal: Nausea and Vomiting Relief  Outcome: Progressing  Goal: Effective Pain Control  Outcome: Progressing  Goal: Effective Oxygenation and Ventilation  Outcome: Progressing  Goal: Baseline Motor  Function  Outcome: Progressing  Goal: Effective Urinary Elimination  Outcome: Progressing     Problem: Fall Injury Risk  Goal: Absence of Fall and Fall-Related Injury  Outcome: Progressing     Problem: Infection  Goal: Absence of Infection Signs and Symptoms  Outcome: Progressing     Problem: Skin Injury Risk Increased  Goal: Skin Health and Integrity  Outcome: Progressing     Problem: Wound  Goal: Optimal Coping  Outcome: Progressing  Goal: Optimal Functional Ability  Outcome: Progressing  Goal: Absence of Infection Signs and Symptoms  Outcome: Progressing  Goal: Improved Oral Intake  Outcome: Progressing  Goal: Optimal Pain Control and Function  Outcome: Progressing  Goal: Skin Health and Integrity  Outcome: Progressing  Goal: Optimal Wound Healing  Outcome: Progressing

## 2024-08-29 NOTE — PROGRESS NOTES
[facility] Medicine  Progress Note    Patient Name: Martha Mcfarlane  MRN: [id number]  Patient Class: IP- Inpatient   Admission Date: 8/27/2024  Length of Stay: 2 days  Attending Physician: Cydney Nguyen MD  Primary Care Provider: Nas Mcneill MD        Subjective:     Principal Problem:Closed displaced fracture of right femoral neck        HPI:  By Dr. Bveerly Carvajal MD    69 y.o.female with history of TIA, recurrent UTIs, mixed incontinence, osteoporosis and followed by Endocrine as outpatient on on Evenity (Romosozunab) for treatment of her osteoporosis and kidney stones presented to the [facility] ER with complaint of right hip pain. Patient states pain is sharp/stabbing in the right groin and is aggravated by any weight bearing and any movement . She reports onset of symptoms was earlier today after trip and fall on her patio resulting in her landing on her right hip. Patient reports that she was outside doing yard work and she tripped the garden hose and lost her balance and went down and tried to catch herself with her arms as she uses braces on her arms but ended up striking her right elbow on the hard concrete as well as her right hip. Patient states she had immediate 8/10 pain to right groin area and could not bear weight due to pain in right hip.  helped her up an able to get her in car and they drove to ED to get evaluated. Patient denies head trauma. Patient denies to loss of consciousness, denies syncope, denies chest pain, denies dizziness prior or after fall. X-ray obtained in ER revealed right femoral neck hip fracture. Orthopedics and [facility] medicine service consulted and patient to be admitted to the hospital to the [facility] service.   Prior to admission patient's functional mobility was independent with no assistive device for home and community distances and very active at baseline. Patient does not have history of gait or balance problems. Patient does not have  history of previous falls or fractures. Patient does not have previous cardiac history such as MI or CAD. Patient does have previous history of TIA. Patient does not have previous history of compensated heart failure. Patient does not have history of diabetes. Patient does not have history of advanced kidney disease with creatinine > 2. Patient currently lives with her  who is at bedside in ED.   In ED, patient states she received IV Morphine for pain so now her right hip pain is 5/10. Labs reviewed. Hgb libby at 13.2. Platelets normal at 158,000. CMP normal. Vital signs stable. Reviewed home medications with patient and updated her home med list in [employer] and reconciled her home medications. Patient stated she took her morning medications today.       Overview/Hospital Course:  Patient admitted to [facility] Medicine Team H: Hip Fracture team and started on Hip Fracture Pathway with Orthopedic surgery consult for closed right displaced femoral neck femur fracture. Patient was seen and evaluated by Orthopedic surgery who recommended operative repair of hip fracture. Patient was medically optimized prior to surgery and to be taken to OR after optimization on 8/28/2024 for right total hip arthroplasty.  Ortho suggested Eliquis 2.5mg BID x 4 weeks, end date 9/26.  Anesthesia Pain managed the Ropivacaine catheter.  PT/OT was consulted postop.    Interval History: No acute events overnight.  Endorses constipation today.  Says pain is controlled.  Drop in Hgb noted - postop, and expected.  PNC in place.  Waiting to work with PT/OT but anticipate DC home with HH.    Review of Systems   Constitutional:  Negative for chills, fatigue and fever.   Respiratory:  Negative for cough and shortness of breath.    Cardiovascular:  Negative for chest pain, palpitations and leg swelling.   Gastrointestinal:  Positive for constipation. Negative for abdominal pain, diarrhea, nausea and vomiting.   Genitourinary:  Negative for  dysuria and urgency.   Musculoskeletal:  Positive for arthralgias and myalgias.   Neurological:  Negative for dizziness and headaches.   All other systems reviewed and are negative.    Objective:     Vital Signs (Most Recent):  Temp: 98.7 °F (37.1 °C) (08/29/24 0748)  Pulse: 82 (08/29/24 0748)  Resp: 18 (08/29/24 0748)  BP: 108/61 (08/29/24 0748)  SpO2: (!) 93 % (08/29/24 0748) Vital Signs (24h Range):  Temp:  [97.8 °F (36.6 °C)-98.7 °F (37.1 °C)] 98.7 °F (37.1 °C)  Pulse:  [65-82] 82  Resp:  [13-29] 18  SpO2:  [93 %-100 %] 93 %  BP: (108-193)/() 108/61     Weight: 63.5 kg (140 lb)  Body mass index is 19.25 kg/m².    Intake/Output Summary (Last 24 hours) at 8/29/2024 1029  Last data filed at 8/29/2024 0528  Gross per 24 hour   Intake 1700 ml   Output 2455 ml   Net -755 ml         Physical Exam  Constitutional:       Appearance: Normal appearance.   HENT:      Head: Normocephalic and atraumatic.   Cardiovascular:      Rate and Rhythm: Normal rate and regular rhythm.      Heart sounds: No murmur heard.  Pulmonary:      Effort: Pulmonary effort is normal. No respiratory distress.      Breath sounds: Normal breath sounds. No wheezing or rales.   Abdominal:      General: There is no distension.      Palpations: Abdomen is soft.      Tenderness: There is no abdominal tenderness.   Musculoskeletal:         General: No deformity.      Comments: Right postop bandages C/D/I   Neurological:      General: No focal deficit present.      Mental Status: She is alert and oriented to person, place, and time. Mental status is at baseline.             Significant Labs: All pertinent labs within the past 24 hours have been reviewed.  CBC:   Recent Labs   Lab 08/27/24  1245 08/28/24  0444 08/29/24  0444   WBC 11.27 5.53 8.19   HGB 13.2 11.5* 10.5*   HCT 40.4 35.8* 30.8*    145* 161     CMP:   Recent Labs   Lab 08/27/24  1245 08/28/24  0444 08/29/24  0444    137 136   K 3.7 3.9 3.9    104 105   CO2 26 26 24   GLU  101 96 138*   BUN 19 16 17   CREATININE 0.7 0.7 0.7   CALCIUM 9.6 8.8 7.7*   PROT 7.5  --   --    ALBUMIN 4.5  --   --    BILITOT 0.8  --   --    ALKPHOS 106  --   --    AST 24  --   --    ALT 20  --   --    ANIONGAP 10 7* 7*       Significant Imaging: I have reviewed all pertinent imaging results/findings within the past 24 hours.    Assessment/Plan:      * Closed displaced fracture of right femoral neck s/p right hemiarthroplasty 8/28  Patient admitted after trip and fall on her garden hose onto her patio striking her right hip and X-rays revealed closed subcapital right femur fracture.   Orthopedics consulted  POD1 from right hemiarthroplasty with Dr. Burnham on 8/28  Pain control as per Hip Fracture Pathway with multimodal pain regimen with scheduled Tylenol 1000 mg po every 8 hours, Robaxin 500 mg po 4 times daily and Lyrica 75 mg po nightly and oral Oxycodone IR 5-10 mg po every 4 hours prn for breakthrough pain.   Ortho suggested Eliquis 2.5mg BID x 4 weeks, end date 9/26  Anesthesia Pain managing Ropivacaine catheter  PT/OT consulted, anticipate DC home with     Postoperative anemia due to acute blood loss  Anemia is likely due to acute blood loss which was from surgery . Most recent hemoglobin and hematocrit are listed below.  Recent Labs     08/27/24  1245 08/28/24  0444 08/29/24  0444   HGB 13.2 11.5* 10.5*   HCT 40.4 35.8* 30.8*     Plan  - Monitor serial CBC: Daily  - Transfuse PRBC if patient becomes hemodynamically unstable, symptomatic or H/H drops below 7/21.  - Patient has not received any PRBC transfusions to date  - Patient's anemia is currently worsening. Will continue current treatment    Mixed stress and urge urinary incontinence  Patient followed by Urology and patient reports since last cystoscopy and treatment by Urology no further issues with UTIs or incontinence.   U/A on admit with no signs of infection.      Primary insomnia  Chronic condition.   Patient takes Ambien 5 mg po nightly  to help to treat as needed and will continue in hospital.       Age-related osteoporosis with current pathological fracture  Patient with known osteoporosis and current right hip fracture related to osteoporosis.   Patient followed by Bob Solorio in Endocrine clinic for management of her osteoporosis and on Calcium and Vitamin D supplementation and recently started on Evenity injections for treatment of her osteoporosis. Per Dr. Nails previous osteoporosis treatment included:  Current regimen: Reclast 5 mg infusion  Prolia 60 mg due to severe constipation  Tried alendronate - last dose was 2014  Restarted alendronate 7/2017 but stopped it September 2017 due to blood pressure issues.       VTE Risk Mitigation (From admission, onward)           Ordered     apixaban tablet 2.5 mg  2 times daily         08/28/24 1753     Place sequential compression device  Until discontinued         08/28/24 1029     Place sequential compression device  Until discontinued         08/27/24 1707     Place BRIGHT hose  Until discontinued         08/27/24 1707                    Discharge Planning   RAVEN: 8/30/2024     Code Status: Full Code   Is the patient medically ready for discharge?:     Reason for patient still in hospital (select all that apply): Patient trending condition, Consult recommendations, and PT / OT recommendations  Discharge Plan A: Home, Home with family                  Cydney Nguyen MD  Department of Hospital Medicine   [facility] - Surgery

## 2024-08-29 NOTE — ASSESSMENT & PLAN NOTE
Anemia is likely due to acute blood loss which was from surgery . Most recent hemoglobin and hematocrit are listed below.  Recent Labs     08/27/24  1245 08/28/24  0444 08/29/24  0444   HGB 13.2 11.5* 10.5*   HCT 40.4 35.8* 30.8*     Plan  - Monitor serial CBC: Daily  - Transfuse PRBC if patient becomes hemodynamically unstable, symptomatic or H/H drops below 7/21.  - Patient has not received any PRBC transfusions to date  - Patient's anemia is currently worsening. Will continue current treatment

## 2024-08-29 NOTE — PLAN OF CARE
OT eval complete. OT POC and goals established.  Problem: Occupational Therapy  Goal: Occupational Therapy Goal  Description: Goals to be met by: 9/28/24     Patient will increase functional independence with ADLs by performing:    UE Dressing with Modified [place].  LE Dressing with Modified [place] and AE prn.  Grooming while standing at sink with Modified [place].  Toileting from toilet/bedside commode with Modified [place] for hygiene and clothing management.   Supine to sit with Modified [place].  Toilet transfer to toilet/bedside commode with Supervision and LRAD.    Outcome: Progressing     Patient demonstrates a mobility limitation that significantly impairs their ability to participate in one or more mobility related activities of daily living. Patient's mobility limitation cannot be sufficiently resolved with the use of a cane, but can be sufficiently resolved with the use of a rolling walker.The use of a rolling walker will considerably improve their ability to participate in MRADLs. Patient will use the walker on a regular basis at home.      Patient has a mobility limitation that significantly impairs their ability to participate in one or more mobility related activities of daily living, including toileting. This deficit can be resolved by using a bedside commode. Patient demonstrates mobility limitations that will cause them to be confined to one room at home without bathroom access for up to 30 days. Using a bedside commode will greatly improve the patient's ability to participate in MRADLs.

## 2024-08-29 NOTE — PT/OT/SLP EVAL
Occupational Therapy   Co-Evaluation  Co-treatment performed due to patient's multiple deficits requiring two skilled therapists to appropriately and safely assess patient's strength and endurance while facilitating functional tasks in addition to accommodating for patient's activity tolerance.      Name: Martha Mcfarlane  MRN: [id number]  Admitting Diagnosis: Closed displaced fracture of right femoral neck  Recent Surgery: Procedure(s) (LRB):  ARTHROPLASTY, HIP (Right) 1 Day Post-Op    Recommendations:     Discharge Recommendations: Low Intensity Therapy  Discharge Equipment Recommendations:  bedside commode, hip kit, walker, rolling  Barriers to discharge:  None    Assessment:     Martha Mcfarlane is a 69 y.o. female with a medical diagnosis of Closed displaced fracture of right femoral neck.  She presents with the following performance deficits affecting function: weakness, impaired endurance, impaired self care skills, impaired functional mobility, gait instability, impaired balance, decreased lower extremity function, pain, impaired cardiopulmonary response to activity, orthopedic precautions. Pt agreeable to therapy and tolerated well. She was primarily limited by pain, impaired coordination, and impaired tolerance to activity. Pt functioning below her baseline and is currently limited in ADLs, functional mobility, and functional transfers. Pt would continue to benefit from skilled OT services to maximize functional independence with ADLs and functional mobility, reduce caregiver burden, and facilitate safe discharge in the least restrictive environment.       Patient continues to demonstrate the need for low intensity therapy on a scheduled basis exhibited by decreased independence with self-care and functional mobility     Rehab Prognosis: Good; patient would benefit from acute skilled OT services to address these deficits and reach maximum level of function.       Plan:     Patient to be seen daily to address the  "above listed problems via self-care/home management, therapeutic activities, therapeutic exercises, neuromuscular re-education  Plan of Care Expires: 09/28/24  Plan of Care Reviewed with: patient    Subjective   "I was supposed to travel to [place] next week"   Chief Complaint: RLE pain; fear of breaking precautions  Patient/Family Comments/goals: to return to PLOF    Occupational Profile:  Living Environment: Pt lives with spouse and son. SSH with threshold. W-I-S with built in bench  Previous level of function: Independent  Roles and Routines: (+) drives; enjoys bike riding and gardening  Equipment Used at Home: other (see comments) (BIB in W-I-S)  Assistance upon Discharge: family    Pain/Comfort:  Pain Rating 1: 3/10  Location - Side 1: Right  Location - Orientation 1: generalized  Location 1: hip  Pain Addressed 1: Pre-medicate for activity, Reposition, Distraction  Pain Rating Post-Intervention 1: 3/10    Patients cultural, spiritual, Moravian conflicts given the current situation: no    Objective:   Additional staff present: CARLOS Warren    Communicated with: RN prior to session.  Patient found HOB elevated with woodward catheter, FCD, perineural catheter, telemetry upon OT entry to room.    General Precautions: Standard, fall  Orthopedic Precautions: RLE weight bearing as tolerated, RLE posterior precautions  Braces: N/A  Respiratory Status: Room air    Occupational Performance:    Bed Mobility:    Patient completed Scooting/Bridging with contact guard assistance  Patient completed Supine to Sit with contact guard assistance    Functional Mobility/Transfers:  Patient completed Sit <> Stand Transfer with contact guard assistance  with  rolling walker   Functional Mobility: Pt engaged in functional mobility to simulate household/community distances 50 ft with CGA and RW in order to maximize functional endurance and standing balance required for engagement in occupations of choice   No LOB or SOB noted  Cues for " upright posture, sequencing, and RW management    Activities of Daily Living:  Grooming: contact guard assistance for functional balance/endurance as pt completed face care while standing at sink using RW  Upper Body Dressing: stand by assistance to don hospital gown over back at edge of bed  Lower Body Dressing: total assistance required to manage (R) sock above heel 2/2 posterior hip precautions    Cognitive/Visual Perceptual:  Cognitive/Psychosocial Skills:     -       Oriented to: Person, Place, Time, and Situation   -       Follows Commands/attention:Follows multistep  commands  -       Safety awareness/insight to disability: impaired   -       Mood/Affect/Coping skills/emotional control: Cooperative and Pleasant    Physical Exam:  Upper Extremity Range of Motion:     -       Right Upper Extremity: WFL  -       Left Upper Extremity: WFL  Upper Extremity Strength:    -       Right Upper Extremity: WFL  -       Left Upper Extremity: WFL    AMPAC 6 Click ADL:  AMPAC Total Score: 16    Treatment & Education:  -Pt educated on hand placement for transfers  -Pt educated on RW placement for ADLs  -Pt educated on proper foot wear s/p surgery  -Pt educated on positioning of sx LE during performance of functional activities vs rest/sleep  -Pt educated on weightbearing and posterior hip surgical precautions  -Pt educated to call for assistance and to transfer with hospital staff only  -Provided education regarding role of OT, POC, & discharge recommendations with pt verbalizing understanding.  Pt had no further questions & when asked whether there were any concerns pt reported none.     Patient left up in chair with all lines intact, call button in reach, and RN notified    GOALS:   Multidisciplinary Problems       Occupational Therapy Goals          Problem: Occupational Therapy    Goal Priority Disciplines Outcome Interventions   Occupational Therapy Goal     OT, PT/OT Progressing    Description: Goals to be met by:  9/28/24     Patient will increase functional independence with ADLs by performing:    UE Dressing with Modified [place].  LE Dressing with Modified [place] and AE prn.  Grooming while standing at sink with Modified [place].  Toileting from toilet/bedside commode with Modified [place] for hygiene and clothing management.   Supine to sit with Modified [place].  Toilet transfer to toilet/bedside commode with Supervision and LRAD.    Patient demonstrates a mobility limitation that significantly impairs their ability to participate in one or more mobility related activities of daily living. Patient's mobility limitation cannot be sufficiently resolved with the use of a cane, but can be sufficiently resolved with the use of a rolling walker.The use of a rolling walker will considerably improve their ability to participate in MRADLs. Patient will use the walker on a regular basis at home.      Patient has a mobility limitation that significantly impairs their ability to participate in one or more mobility related activities of daily living, including toileting. This deficit can be resolved by using a bedside commode. Patient demonstrates mobility limitations that will cause them to be confined to one room at home without bathroom access for up to 30 days. Using a bedside commode will greatly improve the patient's ability to participate in MRADLs.                          History:     Past Medical History:   Diagnosis Date    Age-related osteoporosis with current pathological fracture     Aortic calcification 04/21/2023    Seen on X-ray chest 04/03/2020      Caroline-Walker grade 1 cystocele 03/07/2024    Constipation - functional     Encounter for blood transfusion     History of COVID-19 10/27/2021    Kidney calculi     Melanoma     Mitral valve prolapse     Mixed stress and urge urinary incontinence 03/07/2024    Osteoporosis, unspecified     Primary insomnia 01/20/2016    Stroke     tia     Tenosynovitis, de Quervain 10/06/2021         Past Surgical History:   Procedure Laterality Date    APPENDECTOMY      COLON SURGERY      COLONOSCOPY      COLONOSCOPY N/A 12/15/2015    Procedure: COLONOSCOPY;  Surgeon: ADALBERTO Liao MD;  Location: [facility] (4TH FLR);  Service: Endoscopy;  Laterality: N/A;    CYSTOSCOPY WITH CYSTOMETROGRAPHY N/A 3/7/2024    Procedure: CYSTOSCOPY, WITH CYSTOMETROGRAM;  Surgeon: Jabari Hager MD;  Location: [facility];  Service: Urology;  Laterality: N/A;    CYSTOSCOPY WITH URODYNAMIC TESTING N/A 3/7/2024    Procedure: CYSTOSCOPY, WITH URODYNAMIC TESTING;  Surgeon: Jabari Hager MD;  Location: [facility];  Service: Urology;  Laterality: N/A;    DE QUERVAIN'S RELEASE Right 10/6/2021    Procedure: RELEASE, HAND, FOR DEQUERVAIN'S TENOSYNOVITIS - right;  Surgeon: Aric Leonard MD;  Location: [facility];  Service: Orthopedics;  Laterality: Right;    ELECTROMYOGRAPHY N/A 3/7/2024    Procedure: EMG (ELECTROMYOGRAPHY);  Surgeon: Jabari Hager MD;  Location: [facility];  Service: Urology;  Laterality: N/A;    ESOPHAGOGASTRODUODENOSCOPY N/A 4/8/2020    Procedure: EGD (ESOPHAGOGASTRODUODENOSCOPY);  Surgeon: Jonathan Roth MD;  Location: [facility] (2ND FLR);  Service: Endoscopy;  Laterality: N/A;  check stat hct before     labs will be drawn prior-GT    HYSTERECTOMY      INTUSSUSCEPTION REPAIR      LASIK      LEFT COLECTOMY      melanoma on face removal      PARTIAL HYSTERECTOMY      UROFLOWMETRY N/A 3/7/2024    Procedure: UROFLOWMETRY;  Surgeon: Jabari Hager MD;  Location: [facility];  Service: Urology;  Laterality: N/A;    VOIDING URETHROCYSTOGRAPHY N/A 3/7/2024    Procedure: CYSTOURETHROGRAM, VOIDING;  Surgeon: Jabari Hager MD;  Location: [facility];  Service: Urology;  Laterality: N/A;       Time Tracking:     OT Date of Treatment: 08/29/24  OT Start Time: 0957  OT Stop Time: 1027  OT Total Time (min): 30 min    Billable Minutes:Evaluation 7  Self Care/Home Management  13  Therapeutic Activity 10    8/29/2024

## 2024-08-29 NOTE — PLAN OF CARE
08/29/24 1349   Post-Acute Status   Post-Acute Authorization Home Health   Home Health Status Referrals Sent   Discharge Plan   Discharge Plan A Home Health     SW faxed referral to [facility] via [employer] for review. SW met with the patient at bedside to confirm HH preference and DME need. Pt reports no preference and agreeable to DME (RW, BSC, and Hip Kit. SW will follow up as needed.    Patient accepted bty [facility].    Diane Juarez LMSW  Case Management   [facility]   Ext. [phone]

## 2024-08-30 ENCOUNTER — TELEPHONE (OUTPATIENT)
Dept: ORTHOPEDICS | Facility: CLINIC | Age: 69
End: 2024-08-30
Payer: MEDICARE

## 2024-08-30 VITALS
DIASTOLIC BLOOD PRESSURE: 69 MMHG | OXYGEN SATURATION: 98 % | BODY MASS INDEX: 18.96 KG/M2 | SYSTOLIC BLOOD PRESSURE: 148 MMHG | HEIGHT: 72 IN | WEIGHT: 140 LBS | HEART RATE: 79 BPM | TEMPERATURE: 98 F | RESPIRATION RATE: 18 BRPM

## 2024-08-30 PROBLEM — K59.03 DRUG-INDUCED CONSTIPATION: Status: ACTIVE | Noted: 2024-08-30

## 2024-08-30 PROBLEM — E83.39 HYPOPHOSPHATEMIA: Status: ACTIVE | Noted: 2024-08-30

## 2024-08-30 LAB
ANION GAP SERPL CALC-SCNC: 6 MMOL/L (ref 8–16)
BASOPHILS # BLD AUTO: 0.04 K/UL (ref 0–0.2)
BASOPHILS NFR BLD: 0.5 % (ref 0–1.9)
BUN SERPL-MCNC: 15 MG/DL (ref 8–23)
CALCIUM SERPL-MCNC: 8.5 MG/DL (ref 8.7–10.5)
CHLORIDE SERPL-SCNC: 106 MMOL/L (ref 95–110)
CO2 SERPL-SCNC: 27 MMOL/L (ref 23–29)
CREAT SERPL-MCNC: 0.7 MG/DL (ref 0.5–1.4)
DIFFERENTIAL METHOD BLD: ABNORMAL
EOSINOPHIL # BLD AUTO: 0.1 K/UL (ref 0–0.5)
EOSINOPHIL NFR BLD: 1 % (ref 0–8)
ERYTHROCYTE [DISTWIDTH] IN BLOOD BY AUTOMATED COUNT: 13.1 % (ref 11.5–14.5)
EST. GFR  (NO RACE VARIABLE): >60 ML/MIN/1.73 M^2
GLUCOSE SERPL-MCNC: 114 MG/DL (ref 70–110)
HCT VFR BLD AUTO: 30.2 % (ref 37–48.5)
HGB BLD-MCNC: 10.1 G/DL (ref 12–16)
IMM GRANULOCYTES # BLD AUTO: 0.02 K/UL (ref 0–0.04)
IMM GRANULOCYTES NFR BLD AUTO: 0.3 % (ref 0–0.5)
LYMPHOCYTES # BLD AUTO: 0.8 K/UL (ref 1–4.8)
LYMPHOCYTES NFR BLD: 9.8 % (ref 18–48)
MAGNESIUM SERPL-MCNC: 2.1 MG/DL (ref 1.6–2.6)
MCH RBC QN AUTO: 29.8 PG (ref 27–31)
MCHC RBC AUTO-ENTMCNC: 33.4 G/DL (ref 32–36)
MCV RBC AUTO: 89 FL (ref 82–98)
MONOCYTES # BLD AUTO: 0.6 K/UL (ref 0.3–1)
MONOCYTES NFR BLD: 7.1 % (ref 4–15)
NEUTROPHILS # BLD AUTO: 6.4 K/UL (ref 1.8–7.7)
NEUTROPHILS NFR BLD: 81.3 % (ref 38–73)
NRBC BLD-RTO: 0 /100 WBC
PHOSPHATE SERPL-MCNC: 2.3 MG/DL (ref 2.7–4.5)
PLATELET # BLD AUTO: 161 K/UL (ref 150–450)
PMV BLD AUTO: 10.7 FL (ref 9.2–12.9)
POTASSIUM SERPL-SCNC: 4 MMOL/L (ref 3.5–5.1)
RBC # BLD AUTO: 3.39 M/UL (ref 4–5.4)
SODIUM SERPL-SCNC: 139 MMOL/L (ref 136–145)
WBC # BLD AUTO: 7.87 K/UL (ref 3.9–12.7)

## 2024-08-30 PROCEDURE — 97116 GAIT TRAINING THERAPY: CPT | Mod: CQ

## 2024-08-30 PROCEDURE — 25000003 PHARM REV CODE 250: Performed by: INTERNAL MEDICINE

## 2024-08-30 PROCEDURE — 80048 BASIC METABOLIC PNL TOTAL CA: CPT | Performed by: INTERNAL MEDICINE

## 2024-08-30 PROCEDURE — 97535 SELF CARE MNGMENT TRAINING: CPT | Mod: CO

## 2024-08-30 PROCEDURE — 84100 ASSAY OF PHOSPHORUS: CPT | Performed by: INTERNAL MEDICINE

## 2024-08-30 PROCEDURE — 25000003 PHARM REV CODE 250

## 2024-08-30 PROCEDURE — 83735 ASSAY OF MAGNESIUM: CPT | Performed by: INTERNAL MEDICINE

## 2024-08-30 PROCEDURE — 85025 COMPLETE CBC W/AUTO DIFF WBC: CPT | Performed by: INTERNAL MEDICINE

## 2024-08-30 PROCEDURE — 36415 COLL VENOUS BLD VENIPUNCTURE: CPT | Performed by: INTERNAL MEDICINE

## 2024-08-30 PROCEDURE — 97530 THERAPEUTIC ACTIVITIES: CPT | Mod: CO

## 2024-08-30 PROCEDURE — 25000003 PHARM REV CODE 250: Performed by: HOSPITALIST

## 2024-08-30 RX ORDER — METHOCARBAMOL 500 MG/1
500 TABLET, FILM COATED ORAL EVERY 8 HOURS
Status: DISCONTINUED | OUTPATIENT
Start: 2024-08-30 | End: 2024-08-30 | Stop reason: HOSPADM

## 2024-08-30 RX ORDER — SODIUM,POTASSIUM PHOSPHATES 280-250MG
1 POWDER IN PACKET (EA) ORAL ONCE
Status: COMPLETED | OUTPATIENT
Start: 2024-08-30 | End: 2024-08-30

## 2024-08-30 RX ORDER — BISACODYL 10 MG/1
10 SUPPOSITORY RECTAL DAILY
Status: DISCONTINUED | OUTPATIENT
Start: 2024-08-30 | End: 2024-08-30 | Stop reason: HOSPADM

## 2024-08-30 RX ORDER — POLYETHYLENE GLYCOL 3350 17 G/17G
17 POWDER, FOR SOLUTION ORAL ONCE
Status: COMPLETED | OUTPATIENT
Start: 2024-08-30 | End: 2024-08-30

## 2024-08-30 RX ORDER — OXYCODONE HYDROCHLORIDE 5 MG/1
5 TABLET ORAL EVERY 4 HOURS PRN
Status: DISCONTINUED | OUTPATIENT
Start: 2024-08-30 | End: 2024-08-30

## 2024-08-30 RX ORDER — ACETAMINOPHEN 500 MG
1000 TABLET ORAL EVERY 8 HOURS
Status: DISCONTINUED | OUTPATIENT
Start: 2024-08-30 | End: 2024-08-30 | Stop reason: HOSPADM

## 2024-08-30 RX ADMIN — SENNOSIDES AND DOCUSATE SODIUM 1 TABLET: 50; 8.6 TABLET ORAL at 09:08

## 2024-08-30 RX ADMIN — METHOCARBAMOL 500 MG: 500 TABLET ORAL at 02:08

## 2024-08-30 RX ADMIN — Medication 2 CAPSULE: at 09:08

## 2024-08-30 RX ADMIN — CHOLECALCIFEROL TAB 25 MCG (1000 UNIT) 2000 UNITS: 25 TAB at 09:08

## 2024-08-30 RX ADMIN — ACETAMINOPHEN 1000 MG: 500 TABLET ORAL at 02:08

## 2024-08-30 RX ADMIN — APIXABAN 2.5 MG: 2.5 TABLET, FILM COATED ORAL at 09:08

## 2024-08-30 RX ADMIN — POLYETHYLENE GLYCOL 3350 17 G: 17 POWDER, FOR SOLUTION ORAL at 09:08

## 2024-08-30 RX ADMIN — OXYCODONE HYDROCHLORIDE AND ACETAMINOPHEN 500 MG: 500 TABLET ORAL at 09:08

## 2024-08-30 RX ADMIN — POTASSIUM & SODIUM PHOSPHATES POWDER PACK 280-160-250 MG 1 PACKET: 280-160-250 PACK at 12:08

## 2024-08-30 RX ADMIN — MUPIROCIN 1 G: 20 OINTMENT TOPICAL at 09:08

## 2024-08-30 NOTE — ASSESSMENT & PLAN NOTE
Patient with known osteoporosis and current right hip fracture related to osteoporosis.   Patient followed by Bob Solorio in Endocrine clinic for management of her osteoporosis and on Calcium and Vitamin D supplementation and recently started on Evenity injections for treatment of her osteoporosis. Per Dr. Nails previous osteoporosis treatment included:  Current regimen: Reclast 5 mg infusion  Prolia 60 mg due to severe constipation  Tried alendronate - last dose was 2014  Restarted alendronate 7/2017 but stopped it September 2017 due to blood pressure issues.    Spoke to home care nurse Dixon, to relay that they missed the order for lab draw. Gave verbal to get lab draw for uric acid as well as additional home visit. Dixon states that the patient is retaining fluid in his legs and it is hard for the patient to get in for appointments and that the patient is worried about financial costs. Relayed that we have not seen the patient in clinic and if patients legs are needing additional care, that it is recommended that the patient go into the ER for evaluation, since we have not seen the patient. Verbal order for  shirin was also given. Magdalena Mott LPN 8/30/2019 2:08 PM

## 2024-08-30 NOTE — SUBJECTIVE & OBJECTIVE
Interval History: No acute events overnight.  Still constipated.  Reports having a very strict bowel regimen at home and a BM every day.  But here likely anesthestics and pain meds.  Plan for suppository then hopeful to DC home with HH.  She didn't sleep with SCDs and wants to go home.    Review of Systems   Constitutional:  Negative for chills, fatigue and fever.   Respiratory:  Negative for cough and shortness of breath.    Cardiovascular:  Negative for chest pain, palpitations and leg swelling.   Gastrointestinal:  Positive for constipation. Negative for abdominal pain, diarrhea, nausea and vomiting.   Genitourinary:  Negative for dysuria and urgency.   Musculoskeletal:  Positive for arthralgias and myalgias.   Neurological:  Negative for dizziness and headaches.   All other systems reviewed and are negative.    Objective:     Vital Signs (Most Recent):  Temp: 97.6 °F (36.4 °C) (08/30/24 1109)  Pulse: 89 (08/30/24 1109)  Resp: 18 (08/30/24 1109)  BP: 131/68 (08/30/24 1109)  SpO2: 98 % (08/30/24 1109) Vital Signs (24h Range):  Temp:  [97.6 °F (36.4 °C)-99.7 °F (37.6 °C)] 97.6 °F (36.4 °C)  Pulse:  [] 89  Resp:  [16-18] 18  SpO2:  [95 %-99 %] 98 %  BP: (126-164)/(61-79) 131/68     Weight: 63.5 kg (140 lb)  Body mass index is 19.25 kg/m².    Intake/Output Summary (Last 24 hours) at 8/30/2024 1125  Last data filed at 8/29/2024 2340  Gross per 24 hour   Intake --   Output 3000 ml   Net -3000 ml         Physical Exam  Constitutional:       Appearance: Normal appearance.   HENT:      Head: Normocephalic and atraumatic.   Cardiovascular:      Rate and Rhythm: Normal rate and regular rhythm.      Heart sounds: No murmur heard.  Pulmonary:      Effort: Pulmonary effort is normal. No respiratory distress.      Breath sounds: Normal breath sounds. No wheezing or rales.   Abdominal:      General: There is distension.      Palpations: Abdomen is soft.      Tenderness: There is no abdominal tenderness.   Musculoskeletal:          General: No deformity.      Comments: Right postop bandages C/D/I   Neurological:      General: No focal deficit present.      Mental Status: She is alert and oriented to person, place, and time. Mental status is at baseline.             Significant Labs: All pertinent labs within the past 24 hours have been reviewed.  CBC:   Recent Labs   Lab 08/29/24  0444 08/30/24  0341   WBC 8.19 7.87   HGB 10.5* 10.1*   HCT 30.8* 30.2*    161     CMP:   Recent Labs   Lab 08/29/24  0444 08/30/24  0341    139   K 3.9 4.0    106   CO2 24 27   * 114*   BUN 17 15   CREATININE 0.7 0.7   CALCIUM 7.7* 8.5*   ANIONGAP 7* 6*       Significant Imaging: I have reviewed all pertinent imaging results/findings within the past 24 hours.

## 2024-08-30 NOTE — ADDENDUM NOTE
Addendum  created 08/30/24 1347 by Shelley Cortes RN    Intraprocedure Event deleted, Intraprocedure Event edited

## 2024-08-30 NOTE — ANESTHESIA POST-OP PAIN MANAGEMENT
Acute Pain Service Progress Note    Martha Mcfarlane is a 69 y.o., female, [id number].    Surgery:  ARTHROPLASTY, HIP (Right: Hip)      Post Op Day #: 2     Catheter type: perineural  SIFI     Infusion type: Ropivacaine 0.2%  15cc q3h basal    Problem List:    Active Hospital Problems    Diagnosis  POA    *Closed displaced fracture of right femoral neck s/p right hemiarthroplasty 8/28 [S72.001A]  Yes    Postoperative anemia due to acute blood loss [D62]  No    Mixed stress and urge urinary incontinence [N39.46]  Yes    Primary insomnia [F51.01]  Yes    Age-related osteoporosis with current pathological fracture [M80.00XA]  Yes      Resolved Hospital Problems   No resolved problems to display.       Subjective:                General appearance of alert, oriented, no complaints              Pain with rest: 1    Faces              Pain with movement: 4    Faces              Side Effects                          1. Pruritis No                          2. Nausea No                          3. Motor Blockade No, 0=Ability to raise lower extremities off bed                          4. Sedation No, 1=awake and alert     Objective:                 Catheter site clean, dry, intact      Vitals   Vitals:    08/30/24 0759   BP: 126/66   Pulse: 100   Resp: 16   Temp: 36.9 °C (98.4 °F)        Labs    Admission on 08/27/2024   Component Date Value Ref Range Status    WBC 08/27/2024 11.27  3.90 - 12.70 K/uL Final    RBC 08/27/2024 4.50  4.00 - 5.40 M/uL Final    Hemoglobin 08/27/2024 13.2  12.0 - 16.0 g/dL Final    Hematocrit 08/27/2024 40.4  37.0 - 48.5 % Final    MCV 08/27/2024 90  82 - 98 fL Final    MCH 08/27/2024 29.3  27.0 - 31.0 pg Final    MCHC 08/27/2024 32.7  32.0 - 36.0 g/dL Final    RDW 08/27/2024 12.8  11.5 - 14.5 % Final    Platelets 08/27/2024 158  150 - 450 K/uL Final    MPV 08/27/2024 10.2  9.2 - 12.9 fL Final    Immature Granulocytes 08/27/2024 0.8 (H)  0.0 - 0.5 % Final    Gran # (ANC) 08/27/2024 10.2 (H)  1.8 - 7.7  K/uL Final    Immature Grans (Abs) 08/27/2024 0.09 (H)  0.00 - 0.04 K/uL Final    Lymph # 08/27/2024 0.6 (L)  1.0 - 4.8 K/uL Final    Mono # 08/27/2024 0.4  0.3 - 1.0 K/uL Final    Eos # 08/27/2024 0.0  0.0 - 0.5 K/uL Final    Baso # 08/27/2024 0.03  0.00 - 0.20 K/uL Final    nRBC 08/27/2024 0  0 /100 WBC Final    Gran % 08/27/2024 90.7 (H)  38.0 - 73.0 % Final    Lymph % 08/27/2024 5.0 (L)  18.0 - 48.0 % Final    Mono % 08/27/2024 3.1 (L)  4.0 - 15.0 % Final    Eosinophil % 08/27/2024 0.1  0.0 - 8.0 % Final    Basophil % 08/27/2024 0.3  0.0 - 1.9 % Final    Differential Method 08/27/2024 Automated   Final    Sodium 08/27/2024 138  136 - 145 mmol/L Final    Potassium 08/27/2024 3.7  3.5 - 5.1 mmol/L Final    Chloride 08/27/2024 102  95 - 110 mmol/L Final    CO2 08/27/2024 26  23 - 29 mmol/L Final    Glucose 08/27/2024 101  70 - 110 mg/dL Final    BUN 08/27/2024 19  8 - 23 mg/dL Final    Creatinine 08/27/2024 0.7  0.5 - 1.4 mg/dL Final    Calcium 08/27/2024 9.6  8.7 - 10.5 mg/dL Final    Total Protein 08/27/2024 7.5  6.0 - 8.4 g/dL Final    Albumin 08/27/2024 4.5  3.5 - 5.2 g/dL Final    Total Bilirubin 08/27/2024 0.8  0.1 - 1.0 mg/dL Final    Alkaline Phosphatase 08/27/2024 106  55 - 135 U/L Final    AST 08/27/2024 24  10 - 40 U/L Final    ALT 08/27/2024 20  10 - 44 U/L Final    eGFR 08/27/2024 >60.0  >60 mL/min/1.73 m^2 Final    Anion Gap 08/27/2024 10  8 - 16 mmol/L Final    QRS Duration 08/27/2024 100  ms Final    [facility] QTC Calculation 08/27/2024 446  ms Final    Group & Rh 08/27/2024 B POS   Final    Indirect Krystle 08/27/2024 NEG   Final    Specimen Outdate 08/27/2024 08/30/2024 23:59   Final    Specimen UA 08/27/2024 Urine, Clean Catch   Final    Color, UA 08/27/2024 Yellow  Yellow, Straw, Kajal Final    Appearance, UA 08/27/2024 Clear  Clear Final    pH, UA 08/27/2024 7.0  5.0 - 8.0 Final    Specific Gravity, UA 08/27/2024 1.015  1.005 - 1.030 Final    Protein, UA 08/27/2024 Negative  Negative Final    Glucose,  UA 08/27/2024 Negative  Negative Final    Ketones, UA 08/27/2024 2+ (A)  Negative Final    Bilirubin (UA) 08/27/2024 Negative  Negative Final    Occult Blood UA 08/27/2024 Negative  Negative Final    Nitrite, UA 08/27/2024 Negative  Negative Final    Leukocytes, UA 08/27/2024 Negative  Negative Final    Hemoglobin A1C 08/27/2024 5.0  4.0 - 5.6 % Final    Estimated Avg Glucose 08/27/2024 97  68 - 131 mg/dL Final    Magnesium 08/27/2024 1.9  1.6 - 2.6 mg/dL Final    Phosphorus 08/27/2024 3.5  2.7 - 4.5 mg/dL Final    Prealbumin 08/27/2024 17 (L)  20 - 43 mg/dL Final    Prothrombin Time 08/27/2024 11.1  9.0 - 12.5 sec Final    INR 08/27/2024 1.0  0.8 - 1.2 Final    Transferrin 08/27/2024 234  200 - 375 mg/dL Final    Vit D, 25-Hydroxy 08/27/2024 32  30 - 96 ng/mL Final    UNIT NUMBER 08/27/2024 [id number]   Preliminary    Product Code 08/27/2024 [id number]   Preliminary    DISPENSE STATUS 08/27/2024 CROSSMATCHED   Preliminary    CODING SYSTEM 08/27/2024 [id number]   Preliminary    Unit Blood Type Code 08/27/2024 7300   Preliminary    Unit Blood Type 08/27/2024 B POS   Preliminary    Unit Expiration 08/27/2024 [id number]   Preliminary    CROSSMATCH INTERPRETATION 08/27/2024 Compatible   Preliminary    UNIT NUMBER 08/27/2024 [id number]   Preliminary    Product Code 08/27/2024 [id number]   Preliminary    DISPENSE STATUS 08/27/2024 CROSSMATCHED   Preliminary    CODING SYSTEM 08/27/2024 [id number]   Preliminary    Unit Blood Type Code 08/27/2024 7300   Preliminary    Unit Blood Type 08/27/2024 B POS   Preliminary    Unit Expiration 08/27/2024 [id number]   Preliminary    CROSSMATCH INTERPRETATION 08/27/2024 Compatible   Preliminary    WBC 08/28/2024 5.53  3.90 - 12.70 K/uL Final    RBC 08/28/2024 4.00  4.00 - 5.40 M/uL Final    Hemoglobin 08/28/2024 11.5 (L)  12.0 - 16.0 g/dL Final    Hematocrit 08/28/2024 35.8 (L)  37.0 - 48.5 % Final    MCV 08/28/2024 90  82 - 98 fL Final    MCH 08/28/2024 28.8  27.0 - 31.0 pg Final     MCHC 08/28/2024 32.1  32.0 - 36.0 g/dL Final    RDW 08/28/2024 12.7  11.5 - 14.5 % Final    Platelets 08/28/2024 145 (L)  150 - 450 K/uL Final    MPV 08/28/2024 10.2  9.2 - 12.9 fL Final    Immature Granulocytes 08/28/2024 0.2  0.0 - 0.5 % Final    Gran # (ANC) 08/28/2024 4.2  1.8 - 7.7 K/uL Final    Immature Grans (Abs) 08/28/2024 0.01  0.00 - 0.04 K/uL Final    Lymph # 08/28/2024 0.8 (L)  1.0 - 4.8 K/uL Final    Mono # 08/28/2024 0.5  0.3 - 1.0 K/uL Final    Eos # 08/28/2024 0.1  0.0 - 0.5 K/uL Final    Baso # 08/28/2024 0.03  0.00 - 0.20 K/uL Final    nRBC 08/28/2024 0  0 /100 WBC Final    Gran % 08/28/2024 75.4 (H)  38.0 - 73.0 % Final    Lymph % 08/28/2024 14.1 (L)  18.0 - 48.0 % Final    Mono % 08/28/2024 8.9  4.0 - 15.0 % Final    Eosinophil % 08/28/2024 0.9  0.0 - 8.0 % Final    Basophil % 08/28/2024 0.5  0.0 - 1.9 % Final    Differential Method 08/28/2024 Automated   Final    Sodium 08/28/2024 137  136 - 145 mmol/L Final    Potassium 08/28/2024 3.9  3.5 - 5.1 mmol/L Final    Chloride 08/28/2024 104  95 - 110 mmol/L Final    CO2 08/28/2024 26  23 - 29 mmol/L Final    Glucose 08/28/2024 96  70 - 110 mg/dL Final    BUN 08/28/2024 16  8 - 23 mg/dL Final    Creatinine 08/28/2024 0.7  0.5 - 1.4 mg/dL Final    Calcium 08/28/2024 8.8  8.7 - 10.5 mg/dL Final    Anion Gap 08/28/2024 7 (L)  8 - 16 mmol/L Final    eGFR 08/28/2024 >60.0  >60 mL/min/1.73 m^2 Final    Magnesium 08/28/2024 2.1  1.6 - 2.6 mg/dL Final    Phosphorus 08/28/2024 3.5  2.7 - 4.5 mg/dL Final    WBC 08/29/2024 8.19  3.90 - 12.70 K/uL Final    RBC 08/29/2024 3.41 (L)  4.00 - 5.40 M/uL Final    Hemoglobin 08/29/2024 10.5 (L)  12.0 - 16.0 g/dL Final    Hematocrit 08/29/2024 30.8 (L)  37.0 - 48.5 % Final    MCV 08/29/2024 90  82 - 98 fL Final    MCH 08/29/2024 30.8  27.0 - 31.0 pg Final    MCHC 08/29/2024 34.1  32.0 - 36.0 g/dL Final    RDW 08/29/2024 13.0  11.5 - 14.5 % Final    Platelets 08/29/2024 161  150 - 450 K/uL Final    MPV 08/29/2024 10.7   9.2 - 12.9 fL Final    Immature Granulocytes 08/29/2024 0.2  0.0 - 0.5 % Final    Gran # (ANC) 08/29/2024 6.7  1.8 - 7.7 K/uL Final    Immature Grans (Abs) 08/29/2024 0.02  0.00 - 0.04 K/uL Final    Lymph # 08/29/2024 0.7 (L)  1.0 - 4.8 K/uL Final    Mono # 08/29/2024 0.8  0.3 - 1.0 K/uL Final    Eos # 08/29/2024 0.0  0.0 - 0.5 K/uL Final    Baso # 08/29/2024 0.02  0.00 - 0.20 K/uL Final    nRBC 08/29/2024 0  0 /100 WBC Final    Gran % 08/29/2024 81.7 (H)  38.0 - 73.0 % Final    Lymph % 08/29/2024 8.4 (L)  18.0 - 48.0 % Final    Mono % 08/29/2024 9.4  4.0 - 15.0 % Final    Eosinophil % 08/29/2024 0.1  0.0 - 8.0 % Final    Basophil % 08/29/2024 0.2  0.0 - 1.9 % Final    Differential Method 08/29/2024 Automated   Final    Sodium 08/29/2024 136  136 - 145 mmol/L Final    Potassium 08/29/2024 3.9  3.5 - 5.1 mmol/L Final    Chloride 08/29/2024 105  95 - 110 mmol/L Final    CO2 08/29/2024 24  23 - 29 mmol/L Final    Glucose 08/29/2024 138 (H)  70 - 110 mg/dL Final    BUN 08/29/2024 17  8 - 23 mg/dL Final    Creatinine 08/29/2024 0.7  0.5 - 1.4 mg/dL Final    Calcium 08/29/2024 7.7 (L)  8.7 - 10.5 mg/dL Final    Anion Gap 08/29/2024 7 (L)  8 - 16 mmol/L Final    eGFR 08/29/2024 >60.0  >60 mL/min/1.73 m^2 Final    Magnesium 08/29/2024 1.9  1.6 - 2.6 mg/dL Final    Phosphorus 08/29/2024 3.1  2.7 - 4.5 mg/dL Final    WBC 08/30/2024 7.87  3.90 - 12.70 K/uL Final    RBC 08/30/2024 3.39 (L)  4.00 - 5.40 M/uL Final    Hemoglobin 08/30/2024 10.1 (L)  12.0 - 16.0 g/dL Final    Hematocrit 08/30/2024 30.2 (L)  37.0 - 48.5 % Final    MCV 08/30/2024 89  82 - 98 fL Final    MCH 08/30/2024 29.8  27.0 - 31.0 pg Final    MCHC 08/30/2024 33.4  32.0 - 36.0 g/dL Final    RDW 08/30/2024 13.1  11.5 - 14.5 % Final    Platelets 08/30/2024 161  150 - 450 K/uL Final    MPV 08/30/2024 10.7  9.2 - 12.9 fL Final    Immature Granulocytes 08/30/2024 0.3  0.0 - 0.5 % Final    Gran # (ANC) 08/30/2024 6.4  1.8 - 7.7 K/uL Final    Immature Grans (Abs)  08/30/2024 0.02  0.00 - 0.04 K/uL Final    Lymph # 08/30/2024 0.8 (L)  1.0 - 4.8 K/uL Final    Mono # 08/30/2024 0.6  0.3 - 1.0 K/uL Final    Eos # 08/30/2024 0.1  0.0 - 0.5 K/uL Final    Baso # 08/30/2024 0.04  0.00 - 0.20 K/uL Final    nRBC 08/30/2024 0  0 /100 WBC Final    Gran % 08/30/2024 81.3 (H)  38.0 - 73.0 % Final    Lymph % 08/30/2024 9.8 (L)  18.0 - 48.0 % Final    Mono % 08/30/2024 7.1  4.0 - 15.0 % Final    Eosinophil % 08/30/2024 1.0  0.0 - 8.0 % Final    Basophil % 08/30/2024 0.5  0.0 - 1.9 % Final    Differential Method 08/30/2024 Automated   Final    Sodium 08/30/2024 139  136 - 145 mmol/L Final    Potassium 08/30/2024 4.0  3.5 - 5.1 mmol/L Final    Chloride 08/30/2024 106  95 - 110 mmol/L Final    CO2 08/30/2024 27  23 - 29 mmol/L Final    Glucose 08/30/2024 114 (H)  70 - 110 mg/dL Final    BUN 08/30/2024 15  8 - 23 mg/dL Final    Creatinine 08/30/2024 0.7  0.5 - 1.4 mg/dL Final    Calcium 08/30/2024 8.5 (L)  8.7 - 10.5 mg/dL Final    Anion Gap 08/30/2024 6 (L)  8 - 16 mmol/L Final    eGFR 08/30/2024 >60.0  >60 mL/min/1.73 m^2 Final    Magnesium 08/30/2024 2.1  1.6 - 2.6 mg/dL Final    Phosphorus 08/30/2024 2.3 (L)  2.7 - 4.5 mg/dL Final        Meds   Current Facility-Administered Medications   Medication Dose Route Frequency Provider Last Rate Last Admin    acetaminophen tablet 1,000 mg  1,000 mg Oral Q6H Beverly Carvajal MD   1,000 mg at 08/30/24 0210    apixaban tablet 2.5 mg  2.5 mg Oral BID Adrián Kennedy MD   2.5 mg at 08/29/24 2103    ascorbic acid (vitamin C) tablet 500 mg  500 mg Oral Daily Beverly Carvajal MD   500 mg at 08/29/24 0935    bisacodyL suppository 10 mg  10 mg Rectal Daily PRN Beverly Carvajal MD        melatonin tablet 6 mg  6 mg Oral Nightly PRN Ludmila Licea MD   6 mg at 08/29/24 2103    methocarbamoL tablet 500 mg  500 mg Oral Q6H Shelley Liu MD   500 mg at 08/30/24 0210    mupirocin 2 % ointment 1 g  1 g Nasal BID Beverly Carvajal MD   1 g at  "08/29/24 2102    omega 3-dha-epa-fish oil capsule 2 capsule  2 capsule Oral Daily Beverly Carvajal MD   2 capsule at 08/29/24 0939    ondansetron injection 4 mg  4 mg Intravenous Q12H PRN Beverly Carvajal MD        polyethylene glycol packet 17 g  17 g Oral Daily Beverly Carvajal MD   17 g at 08/29/24 0934    polyethylene glycol packet 17 g  17 g Oral Once Shelley Liu MD        ropivacaine 0.2% Perineural Pump infusion 500 ML   Perineural Continuous SelfridgeBoston bello DO   New Bag at 08/28/24 1852    senna-docusate 8.6-50 mg per tablet 1 tablet  1 tablet Oral BID Beverly Carvajal MD   1 tablet at 08/29/24 2103    sodium chloride 0.9% flush 10 mL  10 mL Intravenous PRN Beverly Carvajal MD        vitamin D 1000 units tablet 2,000 Units  2,000 Units Oral Daily Beverly Carvajal MD   2,000 Units at 08/29/24 0934    zolpidem tablet 5 mg  5 mg Oral Nightly PRN Beverly Carvajal MD               Assessment:                Pain control adequate. Pt sitting on the side of the bed eating breakfast comfortably this morning. She is tearful reporting that she is "too active" and that the hip fx will effect her life immensely. She was also scheduled to go to [place] next week and is upset that she will be missing her trip.     Plan:     1) Pause SIFI PNC in anticipation for d/c today  2) Continue multimodals including Tylenol, Robaxin  3) No PRN requirements currently                 "

## 2024-08-30 NOTE — ASSESSMENT & PLAN NOTE
Anemia is likely due to acute blood loss which was from surgery . Most recent hemoglobin and hematocrit are listed below.  Recent Labs     08/28/24  0444 08/29/24  0444 08/30/24  0341   HGB 11.5* 10.5* 10.1*   HCT 35.8* 30.8* 30.2*       Plan  - Monitor serial CBC: Daily  - Transfuse PRBC if patient becomes hemodynamically unstable, symptomatic or H/H drops below 7/21.  - Patient has not received any PRBC transfusions to date  - Patient's anemia is currently worsening. Will continue current treatment

## 2024-08-30 NOTE — ASSESSMENT & PLAN NOTE
Patient admitted after trip and fall on her garden hose onto her patio striking her right hip and X-rays revealed closed subcapital right femur fracture.   Orthopedics consulted  POD2 from right hemiarthroplasty with Dr. Burnham on 8/28  Pain control as per Hip Fracture Pathway with multimodal pain regimen with scheduled Tylenol 1000 mg po every 8 hours, Robaxin 500 mg po 4 times daily and Lyrica 75 mg po nightly and oral Oxycodone IR 5-10 mg po every 4 hours prn for breakthrough pain.   Ortho suggested Eliquis 2.5mg BID x 4 weeks, end date 9/26  Anesthesia Pain managing Ropivacaine catheter  PT/OT consulted, anticipate DC home with HH

## 2024-08-30 NOTE — NURSING
Nurses Note -- 4 Eyes      8/30/2024   11:38 AM      Skin assessed during: Daily Assessment      [x] No Altered Skin Integrity Present    []Prevention Measures Documented      [] Yes- Altered Skin Integrity Present or Discovered   [] LDA Added if Not in Epic (Describe Wound)   [] New Altered Skin Integrity was Present on Admit and Documented in LDA   [] Wound Image Taken    Wound Care Consulted? No    Attending Nurse:  JASMINE Nevarez    Second RN/Staff Member:   GIN Tidwell

## 2024-08-30 NOTE — PROGRESS NOTES
[facility] Medicine  Progress Note    Patient Name: Martha Mcfarlane  MRN: [id number]  Patient Class: IP- Inpatient   Admission Date: 8/27/2024  Length of Stay: 3 days  Attending Physician: Cydney Nguyen MD  Primary Care Provider: Nas Mcneill MD        Subjective:     Principal Problem:Closed displaced fracture of right femoral neck        HPI:  By Dr. Beverly Carvajal MD    69 y.o.female with history of TIA, recurrent UTIs, mixed incontinence, osteoporosis and followed by Endocrine as outpatient on on Evenity (Romosozunab) for treatment of her osteoporosis and kidney stones presented to the [facility] ER with complaint of right hip pain. Patient states pain is sharp/stabbing in the right groin and is aggravated by any weight bearing and any movement . She reports onset of symptoms was earlier today after trip and fall on her patio resulting in her landing on her right hip. Patient reports that she was outside doing yard work and she tripped the garden hose and lost her balance and went down and tried to catch herself with her arms as she uses braces on her arms but ended up striking her right elbow on the hard concrete as well as her right hip. Patient states she had immediate 8/10 pain to right groin area and could not bear weight due to pain in right hip.  helped her up an able to get her in car and they drove to ED to get evaluated. Patient denies head trauma. Patient denies to loss of consciousness, denies syncope, denies chest pain, denies dizziness prior or after fall. X-ray obtained in ER revealed right femoral neck hip fracture. Orthopedics and [facility] medicine service consulted and patient to be admitted to the hospital to the [facility] service.   Prior to admission patient's functional mobility was independent with no assistive device for home and community distances and very active at baseline. Patient does not have history of gait or balance problems. Patient does not have  history of previous falls or fractures. Patient does not have previous cardiac history such as MI or CAD. Patient does have previous history of TIA. Patient does not have previous history of compensated heart failure. Patient does not have history of diabetes. Patient does not have history of advanced kidney disease with creatinine > 2. Patient currently lives with her  who is at bedside in ED.   In ED, patient states she received IV Morphine for pain so now her right hip pain is 5/10. Labs reviewed. Hgb libby at 13.2. Platelets normal at 158,000. CMP normal. Vital signs stable. Reviewed home medications with patient and updated her home med list in EPIC and reconciled her home medications. Patient stated she took her morning medications today.       Overview/Hospital Course:  Patient admitted to [facility] Medicine Team H: Hip Fracture team and started on Hip Fracture Pathway with Orthopedic surgery consult for closed right displaced femoral neck femur fracture. Patient was seen and evaluated by Orthopedic surgery who recommended operative repair of hip fracture. Patient was medically optimized prior to surgery and to be taken to OR after optimization on 8/28/2024 for right total hip arthroplasty.  Ortho suggested Eliquis 2.5mg BID x 4 weeks, end date 9/26.  Anesthesia Pain managed the Ropivacaine catheter.  PT/OT was consulted postop.    Interval History: No acute events overnight.  Still constipated.  Reports having a very strict bowel regimen at home and a BM every day.  But here likely anesthestics and pain meds.  Plan for suppository then hopeful to DC home with HH.  She didn't sleep with SCDs and wants to go home.    Review of Systems   Constitutional:  Negative for chills, fatigue and fever.   Respiratory:  Negative for cough and shortness of breath.    Cardiovascular:  Negative for chest pain, palpitations and leg swelling.   Gastrointestinal:  Positive for constipation. Negative for abdominal  pain, diarrhea, nausea and vomiting.   Genitourinary:  Negative for dysuria and urgency.   Musculoskeletal:  Positive for arthralgias and myalgias.   Neurological:  Negative for dizziness and headaches.   All other systems reviewed and are negative.    Objective:     Vital Signs (Most Recent):  Temp: 97.6 °F (36.4 °C) (08/30/24 1109)  Pulse: 89 (08/30/24 1109)  Resp: 18 (08/30/24 1109)  BP: 131/68 (08/30/24 1109)  SpO2: 98 % (08/30/24 1109) Vital Signs (24h Range):  Temp:  [97.6 °F (36.4 °C)-99.7 °F (37.6 °C)] 97.6 °F (36.4 °C)  Pulse:  [] 89  Resp:  [16-18] 18  SpO2:  [95 %-99 %] 98 %  BP: (126-164)/(61-79) 131/68     Weight: 63.5 kg (140 lb)  Body mass index is 19.25 kg/m².    Intake/Output Summary (Last 24 hours) at 8/30/2024 1125  Last data filed at 8/29/2024 2340  Gross per 24 hour   Intake --   Output 3000 ml   Net -3000 ml         Physical Exam  Constitutional:       Appearance: Normal appearance.   HENT:      Head: Normocephalic and atraumatic.   Cardiovascular:      Rate and Rhythm: Normal rate and regular rhythm.      Heart sounds: No murmur heard.  Pulmonary:      Effort: Pulmonary effort is normal. No respiratory distress.      Breath sounds: Normal breath sounds. No wheezing or rales.   Abdominal:      General: There is distension.      Palpations: Abdomen is soft.      Tenderness: There is no abdominal tenderness.   Musculoskeletal:         General: No deformity.      Comments: Right postop bandages C/D/I   Neurological:      General: No focal deficit present.      Mental Status: She is alert and oriented to person, place, and time. Mental status is at baseline.             Significant Labs: All pertinent labs within the past 24 hours have been reviewed.  CBC:   Recent Labs   Lab 08/29/24  0444 08/30/24  0341   WBC 8.19 7.87   HGB 10.5* 10.1*   HCT 30.8* 30.2*    161     CMP:   Recent Labs   Lab 08/29/24  0444 08/30/24  0341    139   K 3.9 4.0    106   CO2 24 27   * 114*    BUN 17 15   CREATININE 0.7 0.7   CALCIUM 7.7* 8.5*   ANIONGAP 7* 6*       Significant Imaging: I have reviewed all pertinent imaging results/findings within the past 24 hours.    Assessment/Plan:      * Closed displaced fracture of right femoral neck s/p right hemiarthroplasty 8/28  Patient admitted after trip and fall on her garden hose onto her patio striking her right hip and X-rays revealed closed subcapital right femur fracture.   Orthopedics consulted  POD2 from right hemiarthroplasty with Dr. Burnham on 8/28  Pain control as per Hip Fracture Pathway with multimodal pain regimen with scheduled Tylenol 1000 mg po every 8 hours, Robaxin 500 mg po 4 times daily and Lyrica 75 mg po nightly and oral Oxycodone IR 5-10 mg po every 4 hours prn for breakthrough pain.   Ortho suggested Eliquis 2.5mg BID x 4 weeks, end date 9/26  Anesthesia Pain managing Ropivacaine catheter  PT/OT consulted, anticipate DC home with     Drug-induced constipation  Has a strict bowel regimen at home  Anesthestics and pain meds causing constipation now  Suppository x 1      Hypophosphatemia  Patient has Abnormal Phosphorus: hypophosphatemia. Will continue to monitor electrolytes closely. Will replace the affected electrolytes and repeat labs to be done after interventions completed. The patient's phosphorus results have been reviewed and are listed below.  Recent Labs   Lab 08/30/24  0341   PHOS 2.3*        Postoperative anemia due to acute blood loss  Anemia is likely due to acute blood loss which was from surgery . Most recent hemoglobin and hematocrit are listed below.  Recent Labs     08/28/24  0444 08/29/24  0444 08/30/24  0341   HGB 11.5* 10.5* 10.1*   HCT 35.8* 30.8* 30.2*       Plan  - Monitor serial CBC: Daily  - Transfuse PRBC if patient becomes hemodynamically unstable, symptomatic or H/H drops below 7/21.  - Patient has not received any PRBC transfusions to date  - Patient's anemia is currently worsening. Will continue  current treatment    Mixed stress and urge urinary incontinence  Patient followed by Urology and patient reports since last cystoscopy and treatment by Urology no further issues with UTIs or incontinence.   U/A on admit with no signs of infection.      Primary insomnia  Chronic condition.   Patient takes Ambien 5 mg po nightly to help to treat as needed and will continue in hospital.       Age-related osteoporosis with current pathological fracture  Patient with known osteoporosis and current right hip fracture related to osteoporosis.   Patient followed by Bob Solorio in Endocrine clinic for management of her osteoporosis and on Calcium and Vitamin D supplementation and recently started on Evenity injections for treatment of her osteoporosis. Per Dr. Nails previous osteoporosis treatment included:  Current regimen: Reclast 5 mg infusion  Prolia 60 mg due to severe constipation  Tried alendronate - last dose was 2014  Restarted alendronate 7/2017 but stopped it September 2017 due to blood pressure issues.       VTE Risk Mitigation (From admission, onward)           Ordered     apixaban tablet 2.5 mg  2 times daily         08/28/24 1753     Place sequential compression device  Until discontinued         08/28/24 1029     Place sequential compression device  Until discontinued         08/27/24 1707     Place BRIGHT hose  Until discontinued         08/27/24 1707                    Discharge Planning   RAVEN: 8/30/2024     Code Status: Full Code   Is the patient medically ready for discharge?:     Reason for patient still in hospital (select all that apply): Patient trending condition  Discharge Plan A: Home Health                  Cydney Nguyen MD  Department of Hospital Medicine   [facility] - Surgery

## 2024-08-30 NOTE — ASSESSMENT & PLAN NOTE
Martha Mcfarlane is a 69 y.o. female with right femoral neck fracture, closed, NVI. They take no anticoagulation at home. They required no ambulatory assistive devices prior to this injury.     S/p R RUTH 8/28    WBAT RLE with posterior hip precautions  Eliquis 2.5 BID

## 2024-08-30 NOTE — PT/OT/SLP PROGRESS
Occupational Therapy   Treatment    Name: Martha Mcfarlane  MRN: [id number]  Admitting Diagnosis:  Closed displaced fracture of right femoral neck  2 Days Post-Op    Recommendations:     Discharge Recommendations: Low Intensity Therapy  Discharge Equipment Recommendations:  bedside commode, hip kit, walker, rolling  Barriers to discharge:       Assessment:     Martha Mcfarlane is a 69 y.o. female with a medical diagnosis of Closed displaced fracture of right femoral neck.  She presents with the following performance deficits affecting function: weakness, gait instability, impaired endurance, impaired self care skills, impaired functional mobility, decreased coordination, orthopedic precautions, decreased lower extremity function.     Rehab Prognosis:  Good; patient would benefit from acute skilled OT services to address these deficits and reach maximum level of function.       Plan:     Patient to be seen daily to address the above listed problems via self-care/home management, therapeutic activities, therapeutic exercises  Plan of Care Expires: 09/28/24  Plan of Care Reviewed with: patient    Subjective     Chief Complaint: constipation  Patient/Family Comments/goals: to improve function  Pain/Comfort:  Pain Rating 1: 0/10  Pain Rating Post-Intervention 1: 0/10    Objective:     Communicated with: RN prior to session.  Patient found sitting edge of bed with telemetry, perineural catheter upon OT entry to room.  A client care conference was completed by the OTR and the DUARTE prior to treatment by the DUARTE to discuss the patient's POC and current status.    General Precautions: Standard, fall    Orthopedic Precautions:RLE weight bearing as tolerated  Braces: N/A  Respiratory Status: Room air     Occupational Performance:     Bed Mobility:    Pt EOB/sitting up in chair     Functional Mobility/Transfers:  Patient completed Sit <> Stand Transfer with contact guard assistance  with  rolling walker   Patient completed Bed <> Chair  Transfer using Step Transfer technique with stand by assistance with rolling walker  Patient completed Toilet Transfer Step Transfer technique with contact guard assistance with  rolling walker  Functional Mobility: pt ambulating ~20ft x 2 trials with SBA using RW. No LOB or SOB noted    Activities of Daily Living:  Grooming: stand by assistance for oral hygiene and facial care standing at sink   Toileting: supervision for pericare and clothing mgmt on BSC placed over toilet      AMPAC 6 Click ADL: 20    Treatment & Education:  Pt educated on OT POC and frequency during hospital stay.   Pt educated on proper hand placement and techniques for RW mgmt to improve safety awareness.   Pt educated on importance of OOB activity to improve function and activity tolerance.  Pt educated on hip kit and posterior hip precautions.   Addressed all patient questions/concerns within DUARTE scope of practice.     Patient left up in chair with all lines intact, call button in reach, and RN notified    GOALS:   Multidisciplinary Problems       Occupational Therapy Goals          Problem: Occupational Therapy    Goal Priority Disciplines Outcome Interventions   Occupational Therapy Goal     OT, PT/OT Progressing    Description: Goals to be met by: 9/28/24     Patient will increase functional independence with ADLs by performing:    UE Dressing with Modified [place].  LE Dressing with Modified [place] and AE prn.  Grooming while standing at sink with Modified [place].  Toileting from toilet/bedside commode with Modified [place] for hygiene and clothing management.   Supine to sit with Modified [place].  Toilet transfer to toilet/bedside commode with Supervision and LRAD.    Patient demonstrates a mobility limitation that significantly impairs their ability to participate in one or more mobility related activities of daily living. Patient's mobility limitation cannot be sufficiently resolved with the use of a cane,  but can be sufficiently resolved with the use of a rolling walker.The use of a rolling walker will considerably improve their ability to participate in MRADLs. Patient will use the walker on a regular basis at home.      Patient has a mobility limitation that significantly impairs their ability to participate in one or more mobility related activities of daily living, including toileting. This deficit can be resolved by using a bedside commode. Patient demonstrates mobility limitations that will cause them to be confined to one room at home without bathroom access for up to 30 days. Using a bedside commode will greatly improve the patient's ability to participate in MRADLs.                          Time Tracking:     OT Date of Treatment: 08/30/24  OT Start Time: 0843 (0463)  OT Stop Time: 0916 (1103)  OT Total Time (min): 43 min    Billable Minutes:Self Care/Home Management 23  Therapeutic Activity 20    OT/ADAM: ADAM     Number of ADAM visits since last OT visit: 1    8/30/2024

## 2024-08-30 NOTE — ASSESSMENT & PLAN NOTE
Patient has Abnormal Phosphorus: hypophosphatemia. Will continue to monitor electrolytes closely. Will replace the affected electrolytes and repeat labs to be done after interventions completed. The patient's phosphorus results have been reviewed and are listed below.  Recent Labs   Lab 08/30/24  0341   PHOS 2.3*

## 2024-08-30 NOTE — PT/OT/SLP PROGRESS
Physical Therapy Treatment    Patient Name:  Martha Mcfarlane   MRN:  [id number]    Recommendations:     Discharge Recommendations: Low Intensity Therapy  Discharge Equipment Recommendations: bedside commode, hip kit, walker, rolling  Barriers to discharge: None    Assessment:     Martha Mcfarlane is a 69 y.o. female admitted with a medical diagnosis of Closed displaced fracture of right femoral neck.  She presents with the following impairments/functional limitations: weakness, impaired endurance, impaired self care skills, impaired functional mobility, gait instability, impaired balance, decreased lower extremity function, orthopedic precautions, pt agreeable for therapy, reports  feeling bloated and has not had BM recently, education provided regarding step technique during gait.    Rehab Prognosis: Good; patient would benefit from acute skilled PT services to address these deficits and reach maximum level of function.    Recent Surgery: Procedure(s) (LRB):  ARTHROPLASTY, HIP (Right) 2 Days Post-Op    Plan:     During this hospitalization, patient to be seen 4 x/week (hip pathway 8/30 and 8/31 then decrease to 4x's/week) to address the identified rehab impairments via gait training, therapeutic activities, therapeutic exercises, neuromuscular re-education and progress toward the following goals:    Plan of Care Expires:  09/29/24    Subjective     Chief Complaint: Feeling bloated  Patient/Family Comments/goals: to have a BM  Pain/Comfort:  Pain Rating 1: 2/10  Location - Side 1: Right  Location - Orientation 1: generalized  Location 1: hip  Pain Addressed 1: Reposition  Pain Rating Post-Intervention 1: 2/10      Objective:     Communicated with RN prior to session.  Patient found up in chair with telemetry, perineural catheter upon PT entry to room.     General Precautions: Standard, fall  Orthopedic Precautions: RLE weight bearing as tolerated  Braces: N/A  Respiratory Status: Room air     Functional  Mobility:      Transfers:   Sit <> Stand Transfer: contact guard assistance from bedside chair using rolling walker     Balance:   Sitting balance: GOOD: Maintains balance through MODERATE excursions of active trunk movement  Standing balance:   FAIR+: Takes MINIMAL challenges from all directions  FAIR: Needs CONTACT GUARD during gait                 Gait:  Distance: 75' x 2   Assistive Device: RW  Assistance Level: contact guard assistance  Gait Assessment: Pt takes slow step through steps, occasional cues required for step sequencing, step  length and to take step through steps, pt takes antalgic steps             AM-PAC 6 CLICK MOBILITY  Turning over in bed (including adjusting bedclothes, sheets and blankets)?: 3  Sitting down on and standing up from a chair with arms (e.g., wheelchair, bedside commode, etc.): 3  Moving from lying on back to sitting on the side of the bed?: 3  Moving to and from a bed to a chair (including a wheelchair)?: 3  Need to walk in hospital room?: 3  Climbing 3-5 steps with a railing?: 2  Basic Mobility Total Score: 17       Treatment & Education:  Education provided regarding PT role and PoC to improve strength, and personal mobility, education provided regarding safe gait technique, performed there ex prior to ambulation,  ambulated w/pt and returned to room to use bathroom, educated pt on pull cord as pt needing to spend some time on commode, RN notified of pt status  There ex:  Long arc quads x 10 RLE only  Ankle pumps x 10 BLE    Patient left  in bathroom w/pull cord  with all lines intact, call button in reach, and RN notified..    GOALS:   Multidisciplinary Problems       Physical Therapy Goals          Problem: Physical Therapy    Goal Priority Disciplines Outcome Goal Variances Interventions   Physical Therapy Goal     PT, PT/OT Progressing     Description: Goals to be met by: 2024     Patient will increase functional independence with mobility by performin. Supine  to sit with supervision  2. Sit to stand transfer with Supervision  3. Gait x300 feet with Supervision using Rolling Walker  4. Ascend/Descend 1 curb step with Stand by assistance using Rolling Walker  5. Lower extremity exercise program x30 reps per handout, with supervision  6. Patient able to recall 3/3 posterior hip precautions with independence    DME Justifications:    Bedside Commode- Patient has a mobility limitation that significantly impairs their ability to participate in one or more mobility related activities of daily living, including toileting. This deficit can be resolved by using a bedside commode. Patient demonstrates mobility limitations that will cause them to be confined to one room at home without bathroom access for up to 30 days. Using a bedside commode will greatly improve the patient's ability to participate in MRADLs.     Rolling Walker- Patient demonstrates a mobility limitation that significantly impairs their ability to participate in one or more mobility related activities of daily living. Patient's mobility limitation cannot be sufficiently resolved with the use of a cane, but can be sufficiently resolved with the use of a rolling walker.The use of a rolling walker will considerably improve their ability to participate in MRADLs. Patient will use the walker on a regular basis at home.                                    Time Tracking:     PT Received On: 08/30/24  PT Start Time: 0945     PT Stop Time: 1003  PT Total Time (min): 18 min     Billable Minutes: Gait Training 18min    Treatment Type: Treatment  PT/PTA: PTA     Number of PTA visits since last PT visit: 1 08/30/2024

## 2024-08-30 NOTE — PROGRESS NOTES
"[facility] - Surgery  Orthopedics  Progress Note    Patient Name: Martha Mcfarlane  MRN: [id number]  Admission Date: 8/27/2024  Hospital Length of Stay: 3 days  Attending Provider: Cydney Nguyen MD  Primary Care Provider: Nas Mcneill MD  Follow-up For: Procedure(s) (LRB):  ARTHROPLASTY, HIP (Right)    Post-Operative Day: 2 Days Post-Op  Subjective:     Principal Problem:Closed displaced fracture of right femoral neck    Principal Orthopedic Problem: As above, s/p R RUTH 8/28    Interval History: Pt seen and examined at bedside. NAEON, VSS, AF. Pain controlled. Denies fevers, chills, chest pain, SOB, N/V/D.  Hgb 10.1 this AM. Ambulated 75 ft twice with PT.       Review of patient's allergies indicates:   Allergen Reactions    Orange flavor      Severe headache, nausea    Iodine and iodide containing products     Shellfish containing products        Current Facility-Administered Medications   Medication    acetaminophen tablet 1,000 mg    apixaban tablet 2.5 mg    ascorbic acid (vitamin C) tablet 500 mg    bisacodyL suppository 10 mg    bisacodyL suppository 10 mg    melatonin tablet 6 mg    methocarbamoL tablet 500 mg    mupirocin 2 % ointment 1 g    omega 3-dha-epa-fish oil capsule 2 capsule    ondansetron injection 4 mg    polyethylene glycol packet 17 g    senna-docusate 8.6-50 mg per tablet 1 tablet    sodium chloride 0.9% flush 10 mL    vitamin D 1000 units tablet 2,000 Units    zolpidem tablet 5 mg     Objective:     Vital Signs (Most Recent):  Temp: 97.6 °F (36.4 °C) (08/30/24 1109)  Pulse: 89 (08/30/24 1109)  Resp: 18 (08/30/24 1109)  BP: 131/68 (08/30/24 1109)  SpO2: 98 % (08/30/24 1109) Vital Signs (24h Range):  Temp:  [97.6 °F (36.4 °C)-99.7 °F (37.6 °C)] 97.6 °F (36.4 °C)  Pulse:  [] 89  Resp:  [16-18] 18  SpO2:  [95 %-98 %] 98 %  BP: (126-164)/(61-79) 131/68     Weight: 63.5 kg (140 lb)  Height: 5' 11.5" (181.6 cm)  Body mass index is 19.25 kg/m².      Intake/Output Summary (Last 24 hours) at " 8/30/2024 1433  Last data filed at 8/29/2024 2340  Gross per 24 hour   Intake --   Output 3000 ml   Net -3000 ml        Ortho/SPM Exam  A&O x 3  Regular Rate  Non-Labored Respirations    RLE:   Fires Quad/TA/EHL/GSC  SILT  Compartments soft  WWP, DP palpated  Dressing c/d/i       Significant Labs: CBC:   Recent Labs   Lab 08/29/24  0444 08/30/24  0341   WBC 8.19 7.87   HGB 10.5* 10.1*   HCT 30.8* 30.2*    161     CMP:   Recent Labs   Lab 08/29/24  0444 08/30/24  0341    139   K 3.9 4.0    106   CO2 24 27   * 114*   BUN 17 15   CREATININE 0.7 0.7   CALCIUM 7.7* 8.5*   ANIONGAP 7* 6*     All pertinent labs within the past 24 hours have been reviewed.    Significant Imaging: I have reviewed and interpreted all pertinent imaging results/findings.  Assessment/Plan:     * Closed displaced fracture of right femoral neck s/p right hemiarthroplasty 8/28  Martha Mcfarlane is a 69 y.o. female with right femoral neck fracture, closed, NVI. They take no anticoagulation at home. They required no ambulatory assistive devices prior to this injury.     S/p R RUTH 8/28    WBAT RLE with posterior hip precautions  Eliquis 2.5 BID             Adrián Kennedy MD  Orthopedics  [facility] - Surgery

## 2024-08-30 NOTE — SUBJECTIVE & OBJECTIVE
"Principal Problem:Closed displaced fracture of right femoral neck    Principal Orthopedic Problem: As above, s/p R RUTH 8/28    Interval History: Pt seen and examined at bedside. JERALD, VSS, AF. Pain controlled. Denies fevers, chills, chest pain, SOB, N/V/D.  Hgb 10.1 this AM. Ambulated 75 ft twice with PT.       Review of patient's allergies indicates:   Allergen Reactions    Orange flavor      Severe headache, nausea    Iodine and iodide containing products     Shellfish containing products        Current Facility-Administered Medications   Medication    acetaminophen tablet 1,000 mg    apixaban tablet 2.5 mg    ascorbic acid (vitamin C) tablet 500 mg    bisacodyL suppository 10 mg    bisacodyL suppository 10 mg    melatonin tablet 6 mg    methocarbamoL tablet 500 mg    mupirocin 2 % ointment 1 g    omega 3-dha-epa-fish oil capsule 2 capsule    ondansetron injection 4 mg    polyethylene glycol packet 17 g    senna-docusate 8.6-50 mg per tablet 1 tablet    sodium chloride 0.9% flush 10 mL    vitamin D 1000 units tablet 2,000 Units    zolpidem tablet 5 mg     Objective:     Vital Signs (Most Recent):  Temp: 97.6 °F (36.4 °C) (08/30/24 1109)  Pulse: 89 (08/30/24 1109)  Resp: 18 (08/30/24 1109)  BP: 131/68 (08/30/24 1109)  SpO2: 98 % (08/30/24 1109) Vital Signs (24h Range):  Temp:  [97.6 °F (36.4 °C)-99.7 °F (37.6 °C)] 97.6 °F (36.4 °C)  Pulse:  [] 89  Resp:  [16-18] 18  SpO2:  [95 %-98 %] 98 %  BP: (126-164)/(61-79) 131/68     Weight: 63.5 kg (140 lb)  Height: 5' 11.5" (181.6 cm)  Body mass index is 19.25 kg/m².      Intake/Output Summary (Last 24 hours) at 8/30/2024 1433  Last data filed at 8/29/2024 2340  Gross per 24 hour   Intake --   Output 3000 ml   Net -3000 ml        Ortho/SPM Exam  A&O x 3  Regular Rate  Non-Labored Respirations    RLE:   Fires Quad/TA/EHL/GSC  SILT  Compartments soft  WWP, DP palpated  Dressing c/d/i       Significant Labs: CBC:   Recent Labs   Lab 08/29/24  0444 08/30/24  0341   WBC " 8.19 7.87   HGB 10.5* 10.1*   HCT 30.8* 30.2*    161     CMP:   Recent Labs   Lab 08/29/24  0444 08/30/24  0341    139   K 3.9 4.0    106   CO2 24 27   * 114*   BUN 17 15   CREATININE 0.7 0.7   CALCIUM 7.7* 8.5*   ANIONGAP 7* 6*     All pertinent labs within the past 24 hours have been reviewed.    Significant Imaging: I have reviewed and interpreted all pertinent imaging results/findings.

## 2024-08-30 NOTE — ASSESSMENT & PLAN NOTE
Has a strict bowel regimen at home  Anesthestics and pain meds causing constipation now  Suppository x 1

## 2024-08-30 NOTE — PROGRESS NOTES
Pt resting comfortably.  Right SIFl PNC has been paused. Dressing CDI.  Catheter discontinued, tip intact.  Pt tolerated well.  Educated regarding continued pain management.  Understanding verbalized.

## 2024-08-31 ENCOUNTER — NURSE TRIAGE (OUTPATIENT)
Dept: ADMINISTRATIVE | Facility: CLINIC | Age: 69
End: 2024-08-31
Payer: MEDICARE

## 2024-08-31 LAB
BLD PROD TYP BPU: NORMAL
BLD PROD TYP BPU: NORMAL
BLOOD UNIT EXPIRATION DATE: NORMAL
BLOOD UNIT EXPIRATION DATE: NORMAL
BLOOD UNIT TYPE CODE: 7300
BLOOD UNIT TYPE CODE: 7300
BLOOD UNIT TYPE: NORMAL
BLOOD UNIT TYPE: NORMAL
CODING SYSTEM: NORMAL
CODING SYSTEM: NORMAL
CROSSMATCH INTERPRETATION: NORMAL
CROSSMATCH INTERPRETATION: NORMAL
DISPENSE STATUS: NORMAL
DISPENSE STATUS: NORMAL
TRANS ERYTHROCYTES VOL PATIENT: NORMAL ML
TRANS ERYTHROCYTES VOL PATIENT: NORMAL ML

## 2024-08-31 NOTE — ADDENDUM NOTE
Addendum  created 08/30/24 2217 by Arpita Coburn MD    Charge Capture section accepted, Cosign clinical note with attestation

## 2024-08-31 NOTE — DISCHARGE SUMMARY
[facility] - [facility] Medicine  Discharge Summary      Patient Name: Martha Mcfarlane  MRN: [id number]  JERI: [id number]  Patient Class: IP- Inpatient  Admission Date: 8/27/2024  Hospital Length of Stay: 3 days  Discharge Date and Time: 8/30/2024  6:01 PM  Attending Physician: Cecilia att. providers found   Discharging Provider: Cydney Nguyen MD  Primary Care Provider: Nas Mcneill MD  [facility] Medicine Team: [facility]  Cydney Nguyen MD  Primary Care Team: [facility] MED     HPI:   By Dr. Beverly Carvajal MD    69 y.o.female with history of TIA, recurrent UTIs, mixed incontinence, osteoporosis and followed by Endocrine as outpatient on on Evenity (Romosozunab) for treatment of her osteoporosis and kidney stones presented to the [facility] ER with complaint of right hip pain. Patient states pain is sharp/stabbing in the right groin and is aggravated by any weight bearing and any movement . She reports onset of symptoms was earlier today after trip and fall on her patio resulting in her landing on her right hip. Patient reports that she was outside doing yard work and she tripped the garden hose and lost her balance and went down and tried to catch herself with her arms as she uses braces on her arms but ended up striking her right elbow on the hard concrete as well as her right hip. Patient states she had immediate 8/10 pain to right groin area and could not bear weight due to pain in right hip.  helped her up an able to get her in car and they drove to ED to get evaluated. Patient denies head trauma. Patient denies to loss of consciousness, denies syncope, denies chest pain, denies dizziness prior or after fall. X-ray obtained in ER revealed right femoral neck hip fracture. Orthopedics and [facility] medicine service consulted and patient to be admitted to the hospital to the [facility] service.   Prior to admission patient's functional mobility was independent with no assistive device for home and  community distances and very active at baseline. Patient does not have history of gait or balance problems. Patient does not have history of previous falls or fractures. Patient does not have previous cardiac history such as MI or CAD. Patient does have previous history of TIA. Patient does not have previous history of compensated heart failure. Patient does not have history of diabetes. Patient does not have history of advanced kidney disease with creatinine > 2. Patient currently lives with her  who is at bedside in ED.   In ED, patient states she received IV Morphine for pain so now her right hip pain is 5/10. Labs reviewed. Hgb libby at 13.2. Platelets normal at 158,000. CMP normal. Vital signs stable. Reviewed home medications with patient and updated her home med list in EPIC and reconciled her home medications. Patient stated she took her morning medications today.       Procedure(s) (LRB):  ARTHROPLASTY, HIP (Right)      Hospital Course:   Patient admitted to [facility] Medicine Team H: Hip Fracture team and started on Hip Fracture Pathway with Orthopedic surgery consult for closed right displaced femoral neck femur fracture. Patient was seen and evaluated by Orthopedic surgery who recommended operative repair of hip fracture. Patient was medically optimized prior to surgery and to be taken to OR after optimization on 8/28/2024 for right total hip arthroplasty.  Ortho suggested Eliquis 2.5mg BID x 4 weeks, end date 9/26.  Anesthesia Pain managed the Ropivacaine catheter.  PT/OT was consulted postop who suggested low intense therapy.  She was discharged home with .    Ms. Martha Mcfarlane was deemed appropriate for discharge.  At the time of discharge, the plan of care was discussed with patient/family, who were agreeable and amenable.  All medications were verbally reviewed and discussed with the patient/family.  They endorsed understanding and compliance.  Informed them that these changes will be  "available on their discharge paperwork, as well.  Outpatient follow-ups were scheduled, or if unable to be scheduled ambulatory referrals were placed, and ER return precautions were given.  All questions were answered to the patient/family's satisfaction.  Ms. Martha Mcfarlane was subsequently discharged in stable condition.       Goals of Care Treatment Preferences:  Code Status: Full Code         Consults:   Consults (From admission, onward)          Status Ordering Provider     Inpatient consult to Orthopedic Surgery  Once        Provider:  (Not yet assigned)    KAROLINE Cedillo            No new Assessment & Plan notes have been filed under this hospital service since the last note was generated.  Service: Hospital Medicine    Final Active Diagnoses:    Diagnosis Date Noted POA    PRINCIPAL PROBLEM:  Closed displaced fracture of right femoral neck s/p right hemiarthroplasty 8/28 [S72.001A] 08/27/2024 Yes    Hypophosphatemia [E83.39] 08/30/2024 Yes    Drug-induced constipation [K59.03] 08/30/2024 Yes    Postoperative anemia due to acute blood loss [D62] 08/29/2024 No    Mixed stress and urge urinary incontinence [N39.46] 03/07/2024 Yes    Primary insomnia [F51.01] 01/20/2016 Yes    Age-related osteoporosis with current pathological fracture [M80.00XA]  Yes      Problems Resolved During this Admission:       Discharged Condition: good    Disposition: Home-Health Care [facility]    Follow Up:   Follow-up Information       [place] - Follow up.    Specialties: Home Health Services, Hospice and Palliative Medicine  Contact information:  [address] [place] [address] [id number] [phone]                           Patient Instructions:      WALKER FOR HOME USE     Order Specific Question Answer Comments   Type of Walker: Adult (5'4"-6'6")    With wheels? Yes    Height: 5' 11.5" (1.816 m)    Weight: 63.5 kg (140 lb)    Length of need (1-99 months): 99    Does patient have medical equipment at home? " "none    Please check all that apply: Patient needs help to get in and out of chair.    Please check all that apply: Walker will be used for gait training.    Please check all that apply: Patient is unable to safely ambulate without equipment.      HIP KIT FOR HOME USE     Order Specific Question Answer Comments   Height: 5' 11.5" (1.816 m)    Weight: 63.5 kg (140 lb)    Does patient have medical equipment at home? none    Length of need (1-99 months): 99    Type: [place] Hip Kit      COMMODE FOR HOME USE     Order Specific Question Answer Comments   Type: Standard    Height: 5' 11.5" (1.816 m)    Weight: 63.5 kg (140 lb)    Does patient have medical equipment at home? none    Length of need (1-99 months): 99      Ambulatory referral/consult to Orthopedics   Standing Status: Future   Referral Priority: Routine Referral Type: Consultation   Requested Specialty: Orthopedic Surgery   Number of Visits Requested: 1       Significant Diagnostic Studies: N/A    Pending Diagnostic Studies:       Procedure Component Value Units Date/Time    Specimen to Pathology, Surgery Orthopedics [[id number]] Collected: 08/28/24 1613    Order Status: Sent Lab Status: In process Updated: 08/29/24 1209    Specimen: Tissue            Medications:  Reconciled Home Medications:      Medication List        START taking these medications      acetaminophen 500 MG tablet  Commonly known as: TYLENOL  Take 2 tablets (1,000 mg total) by mouth every 8 (eight) hours. for 10 days     ELIQUIS 2.5 mg Tab  Generic drug: apixaban  Take 1 tablet (2.5 mg total) by mouth 2 (two) times daily.     methocarbamoL 500 MG Tab  Commonly known as: ROBAXIN  Take 1 tablet (500 mg total) by mouth every 6 (six) hours. for 10 days            CONTINUE taking these medications      ascorbic acid (vitamin C) 500 MG tablet  Commonly known as: VITAMIN C  Take 500 mg by mouth once daily.     estradioL 0.01 % (0.1 mg/gram) vaginal cream  Commonly known as: ESTRACE  Place 1 g " vaginally every 7 days.     fish oil-omega-3 fatty acids 300-1,000 mg capsule  Take 2 g by mouth once daily.     magnesium 30 mg Tab  Take 1 tablet by mouth once daily.     MIRALAX 17 gram/dose powder  Generic drug: polyethylene glycol  Take 17 g by mouth once daily.     nystatin cream  Commonly known as: MYCOSTATIN  Apply topically 2 (two) times daily. for 7 days     VITAMIN D-3 ORAL  Take 1 tablet by mouth Daily.     zolpidem 5 MG Tab  Commonly known as: AMBIEN  Take 1 tablet (5 mg total) by mouth nightly as needed (insomnia).              Indwelling Lines/Drains at time of discharge:   Lines/Drains/Airways       Drain  Duration                  Urethral Catheter 08/27/24 1747 Straight-tip 16 Fr. 3 days              Airway  Duration                  Airway - Non-Surgical 08/28/24 1407 2 days                    Time spent on the discharge of patient: 35 minutes         Cydney Nguyen MD  Department of Hospital Medicine  [facility] - Surgery

## 2024-08-31 NOTE — TELEPHONE ENCOUNTER
Pt responded to PD day text message. States that she was recently discharged from hospital. States she did have catheter in place and was removed yesterday around 2pm. States after getting home she states she had to use the bathroom every 2 hours. States that frequency is disturbing her sleep. Asking if medications are causing urination frequency. Advised based on UptoDate, pt also advised to be seen by provider within 24 hours. ED/UC. OOD VV. Pt verbalized understanding,    Reason for Disposition   Urinating more frequently than usual (i.e., frequency) OR new-onset of the feeling of an urgent need to urinate (i.e., urgency)    Additional Information   Negative: Shock suspected (e.g., cold/pale/clammy skin, too weak to stand, low BP, rapid pulse)   Negative: Sounds like a life-threatening emergency to the triager   Negative: [1] Unable to urinate (or only a few drops) > 4 hours AND [2] bladder feels very full (e.g., palpable bladder or strong urge to urinate)   Negative: [1] Decreased urination and [2] drinking very little AND [3] dehydration suspected (e.g., dark urine, no urine > 12 hours, very dry mouth, very lightheaded)   Negative: Patient sounds very sick or weak to the triager   Negative: Fever > 100.4 F (38.0 C)   Negative: Side (flank) or lower back pain present    Protocols used: Urinary Symptoms-A-AH

## 2024-09-02 ENCOUNTER — PATIENT MESSAGE (OUTPATIENT)
Dept: INTERNAL MEDICINE | Facility: CLINIC | Age: 69
End: 2024-09-02
Payer: MEDICARE

## 2024-09-03 ENCOUNTER — PATIENT OUTREACH (OUTPATIENT)
Dept: ADMINISTRATIVE | Facility: CLINIC | Age: 69
End: 2024-09-03
Payer: MEDICARE

## 2024-09-03 LAB
FINAL PATHOLOGIC DIAGNOSIS: NORMAL
GROSS: NORMAL
Lab: NORMAL

## 2024-09-03 NOTE — PROGRESS NOTES
C3 nurse spoke with Martha Mcfarlane  for a TCC post hospital discharge follow up call. The patient has a scheduled [facility] appointment with Nas Mcneill MD on 9/4/2024 at 2:20 PM.

## 2024-09-04 ENCOUNTER — HOSPITAL ENCOUNTER (OUTPATIENT)
Dept: RADIOLOGY | Facility: HOSPITAL | Age: 69
Discharge: HOME | End: 2024-09-04
Attending: INTERNAL MEDICINE
Payer: MEDICARE

## 2024-09-04 ENCOUNTER — OFFICE VISIT (OUTPATIENT)
Dept: INTERNAL MEDICINE | Facility: CLINIC | Age: 69
End: 2024-09-04
Payer: MEDICARE

## 2024-09-04 VITALS
HEIGHT: 71 IN | BODY MASS INDEX: 20.03 KG/M2 | DIASTOLIC BLOOD PRESSURE: 70 MMHG | SYSTOLIC BLOOD PRESSURE: 122 MMHG | HEART RATE: 71 BPM | WEIGHT: 143.06 LBS | OXYGEN SATURATION: 99 %

## 2024-09-04 DIAGNOSIS — R35.0 URINARY FREQUENCY: Primary | ICD-10-CM

## 2024-09-04 DIAGNOSIS — F51.01 PRIMARY INSOMNIA: ICD-10-CM

## 2024-09-04 DIAGNOSIS — N39.46 MIXED STRESS AND URGE URINARY INCONTINENCE: ICD-10-CM

## 2024-09-04 DIAGNOSIS — R35.0 URINARY FREQUENCY: ICD-10-CM

## 2024-09-04 DIAGNOSIS — R00.0 TACHYCARDIA: ICD-10-CM

## 2024-09-04 LAB
BILIRUB UR QL STRIP: NEGATIVE
CLARITY UR REFRACT.AUTO: CLEAR
COLOR UR AUTO: YELLOW
GLUCOSE UR QL STRIP: NEGATIVE
HGB UR QL STRIP: NEGATIVE
KETONES UR QL STRIP: NEGATIVE
LEUKOCYTE ESTERASE UR QL STRIP: NEGATIVE
NITRITE UR QL STRIP: NEGATIVE
OHS QRS DURATION: 96 MS
OHS QTC CALCULATION: 424 MS
PH UR STRIP: 6 [PH] (ref 5–8)
PROT UR QL STRIP: ABNORMAL
SP GR UR STRIP: 1.03 (ref 1–1.03)
URN SPEC COLLECT METH UR: ABNORMAL

## 2024-09-04 PROCEDURE — 93005 ELECTROCARDIOGRAM TRACING: CPT | Mod: PBBFAC | Performed by: INTERNAL MEDICINE

## 2024-09-04 PROCEDURE — 99214 OFFICE O/P EST MOD 30 MIN: CPT | Mod: PBBFAC | Performed by: INTERNAL MEDICINE

## 2024-09-04 PROCEDURE — 71046 X-RAY EXAM CHEST 2 VIEWS: CPT | Mod: 26,,, | Performed by: RADIOLOGY

## 2024-09-04 PROCEDURE — 81003 URINALYSIS AUTO W/O SCOPE: CPT | Performed by: INTERNAL MEDICINE

## 2024-09-04 PROCEDURE — 93010 ELECTROCARDIOGRAM REPORT: CPT | Mod: S$PBB,,, | Performed by: INTERNAL MEDICINE

## 2024-09-04 PROCEDURE — 71046 X-RAY EXAM CHEST 2 VIEWS: CPT | Mod: TC

## 2024-09-04 PROCEDURE — 99214 OFFICE O/P EST MOD 30 MIN: CPT | Mod: S$PBB,,, | Performed by: INTERNAL MEDICINE

## 2024-09-04 PROCEDURE — 99999 PR PBB SHADOW E&M-EST. PATIENT-LVL IV: CPT | Mod: PBBFAC,,, | Performed by: INTERNAL MEDICINE

## 2024-09-04 NOTE — PROGRESS NOTES
Subjective:       Patient ID: Martha Mcfarlane is a 69 y.o. female.    Chief Complaint: POST-OP F/U    Patient had right hip surgery after fall with fracture.  She complains of heart racing at times causing her to have to sit up and urinating excessively since surgery.  She is not on pain meds.  She was given Robaxin and is not using that much.  She was given Xarelto which neither of those should cause frequent urination.  She denies shortness or breath cough fever or rash.  No infection at the site of the surgery.  No dysuria or urinary odor but she wants to know why she is urinating so much.  We will do an exam review some studies and try to evaluate this.      Review of Systems   Constitutional:  Positive for fatigue. Negative for fever.   Cardiovascular:  Positive for palpitations (Increased heart rate at times).   Gastrointestinal:  Negative for abdominal pain.   Endocrine: Positive for polyuria.   Genitourinary:  Positive for frequency and nocturia. Negative for hematuria.   Musculoskeletal:  Positive for back pain.   Psychiatric/Behavioral:  Positive for sleep disturbance.            Past Medical History:   Diagnosis Date    Age-related osteoporosis with current pathological fracture     Aortic calcification 04/21/2023    Seen on X-ray chest 04/03/2020      [place]-Walker grade 1 cystocele 03/07/2024    Constipation - functional     Encounter for blood transfusion     History of COVID-19 10/27/2021    Kidney calculi     Melanoma     Mitral valve prolapse     Mixed stress and urge urinary incontinence 03/07/2024    Osteoporosis, unspecified     Primary insomnia 01/20/2016    Stroke     tia    Tenosynovitis, de Quervain 10/06/2021     Past Surgical History:   Procedure Laterality Date    APPENDECTOMY      COLON SURGERY      COLONOSCOPY      COLONOSCOPY N/A 12/15/2015    Procedure: COLONOSCOPY;  Surgeon: ADALBERTO Liao MD;  Location: [address]);  Service: Endoscopy;  Laterality: N/A;    CYSTOSCOPY WITH  CYSTOMETROGRAPHY N/A 3/7/2024    Procedure: CYSTOSCOPY, WITH CYSTOMETROGRAM;  Surgeon: Jabari Hager MD;  Location: [facility];  Service: Urology;  Laterality: N/A;    CYSTOSCOPY WITH URODYNAMIC TESTING N/A 3/7/2024    Procedure: CYSTOSCOPY, WITH URODYNAMIC TESTING;  Surgeon: Jabari Hager MD;  Location: [facility] OR;  Service: Urology;  Laterality: N/A;    DE QUERVAIN'S RELEASE Right 10/6/2021    Procedure: RELEASE, HAND, FOR DEQUERVAIN'S TENOSYNOVITIS - right;  Surgeon: Aric Leonadr MD;  Location[facility] OR;  Service: Orthopedics;  Laterality: Right;    ELECTROMYOGRAPHY N/A 3/7/2024    Procedure: EMG (ELECTROMYOGRAPHY);  Surgeon: Jabari Hager MD;  Location: [facility];  Service: Urology;  Laterality: N/A;    ESOPHAGOGASTRODUODENOSCOPY N/A 4/8/2020    Procedure: EGD (ESOPHAGOGASTRODUODENOSCOPY);  Surgeon: Jonathan Roth MD;  Location: [facility] ([address] [place] [address]);  Service: Endoscopy;  Laterality: N/A;  check stat hct before     labs will be drawn prior-GT    HIP ARTHROPLASTY Right 8/28/2024    Procedure: ARTHROPLASTY, HIP;  Surgeon: Robbin Burnham MD;  Location: [address];  Service: Orthopedics;  Laterality: Right;    HYSTERECTOMY      INTUSSUSCEPTION REPAIR      LASIK      LEFT COLECTOMY      melanoma on face removal      PARTIAL HYSTERECTOMY      UROFLOWMETRY N/A 3/7/2024    Procedure: UROFLOWMETRY;  Surgeon: Jabari Hager MD;  Location: [facility];  Service: Urology;  Laterality: N/A;    VOIDING URETHROCYSTOGRAPHY N/A 3/7/2024    Procedure: CYSTOURETHROGRAM, VOIDING;  Surgeon: Jabari Hager MD;  Location: [facility];  Service: Urology;  Laterality: N/A;      Patient Active Problem List   Diagnosis    Age-related osteoporosis with current pathological fracture    Hypercalciuria    Primary insomnia    Tenosynovitis, de Quervain    History of melanoma    Aortic calcification    Mixed stress and urge urinary incontinence    [place]-Walker grade 1 cystocele    Closed displaced fracture of right femoral  neck s/p right hemiarthroplasty 8/28    Postoperative anemia due to acute blood loss    Hypophosphatemia    Drug-induced constipation        Objective:      Physical Exam  Constitutional:       General: She is not in acute distress.     Appearance: She is well-developed.   HENT:      Head: Normocephalic and atraumatic.      Right Ear: Tympanic membrane, ear canal and external ear normal.      Left Ear: Tympanic membrane, ear canal and external ear normal.      Mouth/Throat:      Pharynx: No oropharyngeal exudate or posterior oropharyngeal erythema.   Eyes:      General: No scleral icterus.     Conjunctiva/sclera: Conjunctivae normal.      Pupils: Pupils are equal, round, and reactive to light.   Neck:      Thyroid: No thyromegaly.   Cardiovascular:      Rate and Rhythm: Normal rate and regular rhythm.      Pulses: Normal pulses.      Heart sounds: No murmur heard.  Pulmonary:      Effort: Pulmonary effort is normal.      Breath sounds: Normal breath sounds. No wheezing.   Abdominal:      General: Bowel sounds are normal. There is no distension.      Palpations: Abdomen is soft.      Tenderness: There is no abdominal tenderness.   Musculoskeletal:         General: No tenderness.      Cervical back: Normal range of motion and neck supple.      Right lower leg: No edema.      Left lower leg: No edema.      Comments: Bandaged incision right hip.  No obvious swelling or tenderness.  I did not remove the bandage   Lymphadenopathy:      Cervical: No cervical adenopathy.   Skin:     Coloration: Skin is not jaundiced.      Findings: No rash.   Neurological:      General: No focal deficit present.      Mental Status: She is alert and oriented to person, place, and time.   Psychiatric:         Mood and Affect: Mood normal.         Behavior: Behavior normal.         Assessment:       Problem List Items Addressed This Visit          Renal/    Mixed stress and urge urinary incontinence       Other    Primary insomnia     Other  "Visit Diagnoses       Urinary frequency    -  Primary    Relevant Orders    CULTURE, URINE    Urinalysis    X-Ray Chest PA And Lateral    Tachycardia        Relevant Orders    Basic Metabolic Panel    CBC Auto Differential    TSH    EKG 12-lead    X-Ray Chest PA And Lateral            Plan:         Martha was seen today for post-op f/u.    Diagnoses and all orders for this visit:    Urinary frequency  -     CULTURE, URINE; Future  -     Urinalysis  -     X-Ray Chest PA And Lateral; Future    Tachycardia  -     Basic Metabolic Panel; Future  -     CBC Auto Differential; Future  -     TSH; Future  -     EKG 12-lead  -     X-Ray Chest PA And Lateral; Future    Mixed stress and urge urinary incontinence    Primary insomnia           No other obvious findings on exam.  This could be related to anesthesia, less likely meds.  Possible UTI.  We will get EKG, chest x-ray labs and studies    As this patient's PCP, I am actively managing and/or treating their chronic medical conditions including insomnia, osteoporosis and have been for at least 1 year. This includes, but is not limited to, medication management, coordination of care, documentation review from their specialists and labs/imaging review where pertinent.            Portions of this note may have been created with voice recognition software. Occasional "wrong-word" or "sound-a-like" substitutions may have occurred due to the inherent limitations of voice recognition software. Please, read the note carefully and recognize, using context, where substitutions have occurred.  "

## 2024-09-04 NOTE — PLAN OF CARE
[facility] - Surgery  Discharge Final Note    Primary Care Provider: Nas Mcneill MD    Expected Discharge Date: 8/30/2024    Final Discharge Note (most recent)       Final Note - 08/30/24 1801          Final Note    Assessment Type Final Discharge Note     Anticipated Discharge Disposition Home-Health Care [facility] [place].    Hospital Resources/Appts/Education Provided Provided patient/caregiver with written discharge plan information;Provided education on problems/symptoms using teachback                   Future Appointments   Date Time Provider Department Center   9/4/2024  2:20 PM Nas Mcneill MD [facility] [address] [facility]   9/11/2024  2:30 PM Angel Olvera, NP [facility] [address] [facility]   9/23/2024  2:15 PM INJECTION [facility]   10/9/2024 10:45 AM Angel Olvera, NP [facility] [address] [facility]   10/28/2024  1:00 PM Nas Mcneill MD [facility] [address] [facility]     Important Message from Medicare  Important Message from Medicare regarding Discharge Appeal Rights: Given to patient/caregiver, Explained to patient/caregiver, Signed/date by patient/caregiver     Date IMM was signed: 08/30/24  Time IMM was signed: 1443    Contact Info       [facility] [place]   Specialty: Home Health Services, Hospice and Palliative Medicine    [address] [place] [id number]   Phone: [phone]       Next Steps: Follow up

## 2024-09-05 ENCOUNTER — PATIENT MESSAGE (OUTPATIENT)
Dept: INTERNAL MEDICINE | Facility: CLINIC | Age: 69
End: 2024-09-05
Payer: MEDICARE

## 2024-09-05 DIAGNOSIS — D64.9 ANEMIA, UNSPECIFIED TYPE: Primary | ICD-10-CM

## 2024-09-10 ENCOUNTER — TELEPHONE (OUTPATIENT)
Dept: ORTHOPEDICS | Facility: CLINIC | Age: 69
End: 2024-09-10
Payer: MEDICARE
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
## 2024-09-18 ENCOUNTER — OFFICE VISIT (OUTPATIENT)
Dept: ORTHOPEDICS | Facility: CLINIC | Age: 69
End: 2024-09-18
Payer: MEDICARE

## 2024-09-18 ENCOUNTER — HOSPITAL ENCOUNTER (OUTPATIENT)
Dept: RADIOLOGY | Facility: HOSPITAL | Age: 69
Discharge: HOME | End: 2024-09-18
Attending: NURSE PRACTITIONER
Payer: MEDICARE

## 2024-09-18 VITALS
HEIGHT: 71 IN | WEIGHT: 143.06 LBS | DIASTOLIC BLOOD PRESSURE: 66 MMHG | BODY MASS INDEX: 20.03 KG/M2 | HEART RATE: 77 BPM | SYSTOLIC BLOOD PRESSURE: 111 MMHG | TEMPERATURE: 98 F

## 2024-09-18 DIAGNOSIS — M25.551 RIGHT HIP PAIN: ICD-10-CM

## 2024-09-18 DIAGNOSIS — S72.001A CLOSED DISPLACED FRACTURE OF RIGHT FEMORAL NECK: Primary | ICD-10-CM

## 2024-09-18 DIAGNOSIS — M25.551 RIGHT HIP PAIN: Primary | ICD-10-CM

## 2024-09-18 DIAGNOSIS — Z98.890 POST-OPERATIVE STATE: ICD-10-CM

## 2024-09-18 PROCEDURE — 99214 OFFICE O/P EST MOD 30 MIN: CPT | Mod: PBBFAC,25 | Performed by: NURSE PRACTITIONER

## 2024-09-18 PROCEDURE — 99999 PR PBB SHADOW E&M-EST. PATIENT-LVL IV: CPT | Mod: PBBFAC,,, | Performed by: NURSE PRACTITIONER

## 2024-09-18 PROCEDURE — 99024 POSTOP FOLLOW-UP VISIT: CPT | Mod: POP,,, | Performed by: NURSE PRACTITIONER

## 2024-09-18 PROCEDURE — 73502 X-RAY EXAM HIP UNI 2-3 VIEWS: CPT | Mod: TC,RT

## 2024-09-18 NOTE — PROGRESS NOTES
Ms. Mcfarlane is here today for a post-operative visit after undergoing the following:    Right total hip arthroplasty for femoral neck fracture      by Dr. Burnham on 8/28/2024.    Interval History:  She reports that she is doing ok.  She states their pain is controlled.  She is not taking pain medication.  She is participating in therapy. She states she is following her hip precautions.  She has asked if paperwork can be completed for a trip she had to cancel.  She denies any falls or injuries since surgery/last seen in the clinic.  She denies fever, chills, and sweats since the time of the surgery.     Physical exam:  The patient is seen ambulating in the exam room without an assistive device.  The patient's dressing was taken down.  Incision is clean, dry and intact.  No signs of infection noted.  Skin was closed with Dermabond and Stratifix ends were clipped.  She has tactile stimulation to their lower leg, she denies calf pain, there is no leg edema and their pedal pulse is palpable x 2.     RADS: Right hip xray was obtained, findings show a RUTH appears to be in good position.  No evidence of hardware failure noted.    Assessment:  Post-op visit (3 weeks)    Plan:    ICD-10-CM ICD-9-CM   1. Closed displaced fracture of right femoral neck s/p right hemiarthroplasty 8/28  S72.001A 820.8   2. Post-operative state  Z98.890 V45.89     Current care, treatment plan, precautions, activity level/ modifications, limitations, rehabilitation exercises and proposed future treatment were discussed with the patient. We discussed the need to monitor for changes in symptoms and condition and report them to the physician.  Discussed importance of compliance with all appointments and follow up examinations.     WOUND CARE ORDERS  -Martha was advised to keep the incision clean and dry for the next 24 hours after which she may wash the area with antibacterial soap in the shower.   -She not submerge until the incision is completely  healed  -Patient was advised to monitor wound closely and multiple times daily for any problems. Call clinic immediately or report to ED for immediate medical attention for any complications including reopening of wound, drainage, purulence, redness, streaking, odor, pain out of proportion, fever, chills, etc.   - If there are signs of infection, please call [facility] [phone] for further instructions and to make appt to be seen.       PHYSICAL THERAPY:   - Physical therapy as ordered.  - Weight bearing as tolerated   - Range of motion posterior hip precautions x 6 weeks from surgery, needs 3 more weeks.     PAIN MEDICATION:   - Pain medication: refill was not needed  - Pain medication refill policy provided to patient for review, yes.    - Patient was informed a multi-modal approach is used to treat their pain.     DVT PROPHYLAXIS:   - Eliquis 2.5 mg bid    FRAGILITY:  - Patient has been referred to the fracture clinic.     FOLLOW UP:   - Patient will follow up in the clinic in 4 weeks.  - X-ray of her right hip is needed.    - Future Appointments:   Future Appointments   Date Time Provider Department Center   9/19/2024  1:15 PM INJECTION [facility]   10/9/2024 10:45 AM [facility] NP [facility]   10/29/2024  8:20 AM Nas Mcneill MD [facility]           If there are any questions prior to scheduled follow up, the patient was instructed to contact the office

## 2024-09-19 ENCOUNTER — PATIENT MESSAGE (OUTPATIENT)
Dept: ORTHOPEDICS | Facility: CLINIC | Age: 69
End: 2024-09-19
Payer: MEDICARE

## 2024-09-19 ENCOUNTER — PATIENT MESSAGE (OUTPATIENT)
Dept: INTERNAL MEDICINE | Facility: CLINIC | Age: 69
End: 2024-09-19
Payer: MEDICARE

## 2024-09-19 ENCOUNTER — INFUSION (OUTPATIENT)
Dept: INFECTIOUS DISEASES | Facility: HOSPITAL | Age: 69
End: 2024-09-19
Attending: INTERNAL MEDICINE
Payer: MEDICARE

## 2024-09-19 VITALS
SYSTOLIC BLOOD PRESSURE: 120 MMHG | HEART RATE: 78 BPM | HEIGHT: 71 IN | DIASTOLIC BLOOD PRESSURE: 60 MMHG | TEMPERATURE: 98 F | WEIGHT: 141 LBS | RESPIRATION RATE: 20 BRPM | OXYGEN SATURATION: 98 % | BODY MASS INDEX: 19.74 KG/M2

## 2024-09-19 DIAGNOSIS — M80.00XD AGE-RELATED OSTEOPOROSIS WITH CURRENT PATHOLOGICAL FRACTURE WITH ROUTINE HEALING, SUBSEQUENT ENCOUNTER: ICD-10-CM

## 2024-09-19 DIAGNOSIS — R82.994 HYPERCALCIURIA: Primary | ICD-10-CM

## 2024-09-19 PROCEDURE — 96372 THER/PROPH/DIAG INJ SC/IM: CPT

## 2024-09-19 PROCEDURE — 63600175 PHARM REV CODE 636 W HCPCS: Mod: JZ,JG | Performed by: INTERNAL MEDICINE

## 2024-09-19 RX ADMIN — ROMOSOZUMAB-AQQG 210 MG: 105 INJECTION, SOLUTION SUBCUTANEOUS at 01:09

## 2024-09-19 NOTE — PROGRESS NOTES
Patient arrives for Evenity (2 separate injections) injection - confirms use of calcium and vitamin D supplements and denies dental procedures over past 3 months - administered per guidelines     Limited head-to-toe assessment due to privacy issues and visit reason though the opportunity was given for patient to express any concerns    Next two appts given to Pt.

## 2024-09-23 PROBLEM — Z74.09 IMPAIRED FUNCTIONAL MOBILITY AND ACTIVITY TOLERANCE: Status: ACTIVE | Noted: 2024-09-23

## 2024-09-23 PROBLEM — R29.898 HIP WEAKNESS: Status: ACTIVE | Noted: 2024-09-23

## 2024-10-09 ENCOUNTER — HOSPITAL ENCOUNTER (OUTPATIENT)
Dept: RADIOLOGY | Facility: HOSPITAL | Age: 69
Discharge: HOME | End: 2024-10-09
Attending: NURSE PRACTITIONER
Payer: MEDICARE

## 2024-10-09 ENCOUNTER — OFFICE VISIT (OUTPATIENT)
Dept: ORTHOPEDICS | Facility: CLINIC | Age: 69
End: 2024-10-09
Payer: MEDICARE

## 2024-10-09 DIAGNOSIS — M25.551 RIGHT HIP PAIN: ICD-10-CM

## 2024-10-09 DIAGNOSIS — S72.001A CLOSED DISPLACED FRACTURE OF RIGHT FEMORAL NECK: Primary | ICD-10-CM

## 2024-10-09 DIAGNOSIS — Z98.890 POST-OPERATIVE STATE: ICD-10-CM

## 2024-10-09 DIAGNOSIS — M25.551 RIGHT HIP PAIN: Primary | ICD-10-CM

## 2024-10-09 PROCEDURE — 73502 X-RAY EXAM HIP UNI 2-3 VIEWS: CPT | Mod: 26,RT,, | Performed by: INTERNAL MEDICINE

## 2024-10-09 PROCEDURE — 99212 OFFICE O/P EST SF 10 MIN: CPT | Mod: PBBFAC,25 | Performed by: NURSE PRACTITIONER

## 2024-10-09 PROCEDURE — 73502 X-RAY EXAM HIP UNI 2-3 VIEWS: CPT | Mod: TC,RT

## 2024-10-09 PROCEDURE — 99999 PR PBB SHADOW E&M-EST. PATIENT-LVL II: CPT | Mod: PBBFAC,,, | Performed by: NURSE PRACTITIONER

## 2024-10-09 PROCEDURE — 99024 POSTOP FOLLOW-UP VISIT: CPT | Mod: POP,,, | Performed by: NURSE PRACTITIONER

## 2024-10-09 NOTE — PROGRESS NOTES
Ms. Mcfarlane is here today for a post-operative visit after undergoing the following:    Right total hip arthroplasty for femoral neck fracture      by Dr. Burnham on 8/28/2024.    Interval History:  She reports that she is doing well.  She has no pain.  She is not taking pain medication.  She is participating in therapy, reports she uses a cane for long distances but has no assistive device when home.. She states she has followed her hip precautions.  She is hoping to travel to see family.  She denies any falls or injuries since surgery/last seen in the clinic.  She denies fever, chills, and sweats since the time of the surgery.     Physical exam:  The patient is seen ambulating in the exam room without an assistive device.  The patient's incision has healed.  She has tactile stimulation to their lower leg, she denies calf pain, there is no leg edema and their pedal pulse is palpable x 2.  She has no pain with axil loading or log roll.  Hip ROM is 0-90 degrees.    RADS: Right hip xray was obtained, findings show a RUTH appears to be in good position.  No evidence of hardware failure noted.    Assessment:  Post-op visit (6 weeks)    Plan:    ICD-10-CM ICD-9-CM   1. Closed displaced fracture of right femoral neck s/p right hemiarthroplasty 8/28  S72.001A 820.8   2. Post-operative state  Z98.890 V45.89       Current care, treatment plan, precautions, activity level/ modifications, limitations, rehabilitation exercises and proposed future treatment were discussed with the patient. We discussed the need to monitor for changes in symptoms and condition and report them to the physician.  Discussed importance of compliance with all appointments and follow up examinations.       PHYSICAL THERAPY:   - Physical therapy as ordered.  - Weight bearing as tolerated   - Range of motion may advance as pain allows.     PAIN MEDICATION:   - Pain medication: refill was not needed  - Pain medication refill policy provided to patient for  review, yes.    - Patient was informed a multi-modal approach is used to treat their pain.     DVT PROPHYLAXIS:   - Eliquis 2.5 mg bid ok to stop.     FOLLOW UP:   - Patient will follow up in the clinic in 6 weeks.  - X-ray of her right hip is needed.    - Future Appointments:   Future Appointments   Date Time Provider Department Center   10/11/2024 11:00 AM Oanh Mattson, PTA [facility]   10/14/2024  9:40 AM Topher Patiño, PT [facility]   10/18/2024 11:00 AM Topher Patiño, PT [facility]   10/22/2024  9:40 AM Oanh Mattson, PTA [facility]   10/23/2024  1:15 PM INJECTION [facility]   10/25/2024 11:00 AM Topher Patiño, PT [facility]   10/29/2024  8:20 AM Nas Mcneill MD [facility]   11/20/2024  9:30 AM Angel Olvera NP [facility]   11/26/2024  2:45 PM INJECTION [facility]       If there are any questions prior to scheduled follow up, the patient was instructed to contact the office

## 2024-10-12 ENCOUNTER — EXTERNAL HOME HEALTH (OUTPATIENT)
Dept: HOME HEALTH SERVICES | Facility: HOSPITAL | Age: 69
End: 2024-10-12
Payer: MEDICARE

## 2024-10-23 ENCOUNTER — INFUSION (OUTPATIENT)
Dept: INFECTIOUS DISEASES | Facility: HOSPITAL | Age: 69
End: 2024-10-23
Attending: PHYSICIAN ASSISTANT
Payer: MEDICARE

## 2024-10-23 VITALS
DIASTOLIC BLOOD PRESSURE: 61 MMHG | SYSTOLIC BLOOD PRESSURE: 123 MMHG | HEART RATE: 75 BPM | WEIGHT: 140 LBS | RESPIRATION RATE: 18 BRPM | OXYGEN SATURATION: 98 % | HEIGHT: 71 IN | TEMPERATURE: 99 F | BODY MASS INDEX: 19.6 KG/M2

## 2024-10-23 DIAGNOSIS — R82.994 HYPERCALCIURIA: Primary | ICD-10-CM

## 2024-10-23 DIAGNOSIS — M80.00XD AGE-RELATED OSTEOPOROSIS WITH CURRENT PATHOLOGICAL FRACTURE WITH ROUTINE HEALING, SUBSEQUENT ENCOUNTER: ICD-10-CM

## 2024-10-23 PROCEDURE — 96372 THER/PROPH/DIAG INJ SC/IM: CPT

## 2024-10-23 PROCEDURE — 63600175 PHARM REV CODE 636 W HCPCS: Mod: JZ,JG | Performed by: INTERNAL MEDICINE

## 2024-10-23 RX ADMIN — ROMOSOZUMAB-AQQG 210 MG: 105 INJECTION, SOLUTION SUBCUTANEOUS at 01:10

## 2024-10-23 NOTE — PROGRESS NOTES
Patient arrives for Evenity (2 separate injections) injection - confirms use of calcium and vitamin D supplements and denies dental procedures over past 3 months - administered per guidelines   Limited head-to-toe assessment due to privacy issues and visit reason though the opportunity was given for patient to express any concerns

## 2024-11-18 ENCOUNTER — PATIENT MESSAGE (OUTPATIENT)
Dept: ENDOCRINOLOGY | Facility: CLINIC | Age: 69
End: 2024-11-18
Payer: MEDICARE

## 2024-11-20 ENCOUNTER — HOSPITAL ENCOUNTER (OUTPATIENT)
Dept: RADIOLOGY | Facility: HOSPITAL | Age: 69
Discharge: HOME | End: 2024-11-20
Attending: NURSE PRACTITIONER
Payer: MEDICARE

## 2024-11-20 ENCOUNTER — OFFICE VISIT (OUTPATIENT)
Dept: ORTHOPEDICS | Facility: CLINIC | Age: 69
End: 2024-11-20
Payer: MEDICARE

## 2024-11-20 DIAGNOSIS — Z98.890 POST-OPERATIVE STATE: ICD-10-CM

## 2024-11-20 DIAGNOSIS — M25.551 RIGHT HIP PAIN: ICD-10-CM

## 2024-11-20 DIAGNOSIS — M25.551 RIGHT HIP PAIN: Primary | ICD-10-CM

## 2024-11-20 DIAGNOSIS — S72.001A CLOSED DISPLACED FRACTURE OF RIGHT FEMORAL NECK: Primary | ICD-10-CM

## 2024-11-20 PROCEDURE — 99213 OFFICE O/P EST LOW 20 MIN: CPT | Mod: PBBFAC,25 | Performed by: NURSE PRACTITIONER

## 2024-11-20 PROCEDURE — 73502 X-RAY EXAM HIP UNI 2-3 VIEWS: CPT | Mod: TC,RT

## 2024-11-20 PROCEDURE — 99999 PR PBB SHADOW E&M-EST. PATIENT-LVL III: CPT | Mod: PBBFAC,,, | Performed by: NURSE PRACTITIONER

## 2024-11-20 PROCEDURE — 73502 X-RAY EXAM HIP UNI 2-3 VIEWS: CPT | Mod: 26,RT,, | Performed by: INTERNAL MEDICINE

## 2024-11-20 PROCEDURE — 99024 POSTOP FOLLOW-UP VISIT: CPT | Mod: POP,,, | Performed by: NURSE PRACTITIONER

## 2024-11-20 NOTE — PROGRESS NOTES
Ms. Mcfarlane is here today for a post-operative visit after undergoing the following:    Right total hip arthroplasty for femoral neck fracture      by Dr. Burnham on 8/28/2024.    Interval History:  She reports that she is doing well.  She has no pain.  She is not taking pain medication.  She traveled to [place] and recently [place], no issues.  She does endorse intermittent morning stiffness that gets better with activities.    She denies any falls or injuries since surgery/last seen in the clinic.  She denies fever, chills, and sweats since the time of the surgery.     Physical exam:  The patient is seen ambulating and exercising in the exam room without an assistive device.  The patient's incision has healed.  She has tactile stimulation to their lower leg, she denies calf pain, there is no leg edema and their pedal pulse is palpable x 2.  She has no pain with axial loading or log roll.  Hip ROM is 0-90 degrees.    RADS: Right hip xray was obtained, findings show a RUTH appears to be in good position.  No evidence of hardware failure noted.    Assessment:  Post-op visit (12 weeks)    Plan:    ICD-10-CM ICD-9-CM   1. Closed displaced fracture of right femoral neck s/p right hemiarthroplasty 8/28/24  S72.001A 820.8   2. Post-operative state  Z98.890 V45.89     Current care, treatment plan, precautions, activity level/ modifications, limitations, rehabilitation exercises and proposed future treatment were discussed with the patient. We discussed the need to monitor for changes in symptoms and condition and report them to the physician.  Discussed importance of compliance with all appointments and follow up examinations.       PHYSICAL THERAPY:   - HEP.  - Weight bearing as tolerated   - Range of motion as tolerates.     PAIN MEDICATION:   - Pain medication: refill was not needed  - Pain medication refill policy provided to patient for review, yes.    - Patient was informed a multi-modal approach is used to treat  their pain.      FOLLOW UP:   - Patient will follow up in the clinic in 12 weeks.  - X-ray of her right hip is needed.    - Future Appointments:   Future Appointments   Date Time Provider Department Center   11/22/2024  8:00 AM Nas Mcneill MD [facility]   11/26/2024  2:45 PM INJECTION [facility]   2/20/2025  9:30 AM Angel Olvera NP [facility]       If there are any questions prior to scheduled follow up, the patient was instructed to contact the office

## 2024-11-21 ENCOUNTER — PATIENT MESSAGE (OUTPATIENT)
Dept: ORTHOPEDICS | Facility: CLINIC | Age: 69
End: 2024-11-21
Payer: MEDICARE

## 2024-11-22 ENCOUNTER — IMMUNIZATION (OUTPATIENT)
Dept: INTERNAL MEDICINE | Facility: CLINIC | Age: 69
End: 2024-11-22
Payer: MEDICARE

## 2024-11-22 ENCOUNTER — LAB VISIT (OUTPATIENT)
Dept: LAB | Facility: HOSPITAL | Age: 69
End: 2024-11-22
Attending: INTERNAL MEDICINE
Payer: MEDICARE

## 2024-11-22 ENCOUNTER — OFFICE VISIT (OUTPATIENT)
Dept: INTERNAL MEDICINE | Facility: CLINIC | Age: 69
End: 2024-11-22
Payer: MEDICARE

## 2024-11-22 VITALS
DIASTOLIC BLOOD PRESSURE: 74 MMHG | HEART RATE: 63 BPM | HEIGHT: 71 IN | BODY MASS INDEX: 19.66 KG/M2 | OXYGEN SATURATION: 99 % | WEIGHT: 140.44 LBS | SYSTOLIC BLOOD PRESSURE: 138 MMHG

## 2024-11-22 DIAGNOSIS — D64.9 ANEMIA, UNSPECIFIED TYPE: ICD-10-CM

## 2024-11-22 DIAGNOSIS — I83.813 VARICOSE VEINS OF BOTH LOWER EXTREMITIES WITH PAIN: Primary | ICD-10-CM

## 2024-11-22 DIAGNOSIS — Z23 NEED FOR VACCINATION: Primary | ICD-10-CM

## 2024-11-22 DIAGNOSIS — Z71.84 TRAVEL ADVICE ENCOUNTER: ICD-10-CM

## 2024-11-22 DIAGNOSIS — G47.00 INSOMNIA, UNSPECIFIED TYPE: ICD-10-CM

## 2024-11-22 DIAGNOSIS — E78.5 DYSLIPIDEMIA: ICD-10-CM

## 2024-11-22 LAB
BASOPHILS # BLD AUTO: 0.04 K/UL (ref 0–0.2)
BASOPHILS NFR BLD: 0.8 % (ref 0–1.9)
CHOLEST SERPL-MCNC: 164 MG/DL (ref 120–199)
CHOLEST/HDLC SERPL: 2.8 {RATIO} (ref 2–5)
DIFFERENTIAL METHOD BLD: ABNORMAL
EOSINOPHIL # BLD AUTO: 0.1 K/UL (ref 0–0.5)
EOSINOPHIL NFR BLD: 1.2 % (ref 0–8)
ERYTHROCYTE [DISTWIDTH] IN BLOOD BY AUTOMATED COUNT: 12.7 % (ref 11.5–14.5)
HCT VFR BLD AUTO: 41.4 % (ref 37–48.5)
HDLC SERPL-MCNC: 59 MG/DL (ref 40–75)
HDLC SERPL: 36 % (ref 20–50)
HGB BLD-MCNC: 13.1 G/DL (ref 12–16)
IMM GRANULOCYTES # BLD AUTO: 0.01 K/UL (ref 0–0.04)
IMM GRANULOCYTES NFR BLD AUTO: 0.2 % (ref 0–0.5)
LDLC SERPL CALC-MCNC: 90.6 MG/DL (ref 63–159)
LYMPHOCYTES # BLD AUTO: 1.2 K/UL (ref 1–4.8)
LYMPHOCYTES NFR BLD: 25.2 % (ref 18–48)
MCH RBC QN AUTO: 29.3 PG (ref 27–31)
MCHC RBC AUTO-ENTMCNC: 31.6 G/DL (ref 32–36)
MCV RBC AUTO: 93 FL (ref 82–98)
MONOCYTES # BLD AUTO: 0.4 K/UL (ref 0.3–1)
MONOCYTES NFR BLD: 7.8 % (ref 4–15)
NEUTROPHILS # BLD AUTO: 3.2 K/UL (ref 1.8–7.7)
NEUTROPHILS NFR BLD: 64.8 % (ref 38–73)
NONHDLC SERPL-MCNC: 105 MG/DL
NRBC BLD-RTO: 0 /100 WBC
PLATELET # BLD AUTO: 187 K/UL (ref 150–450)
PMV BLD AUTO: 11.2 FL (ref 9.2–12.9)
RBC # BLD AUTO: 4.47 M/UL (ref 4–5.4)
TRIGL SERPL-MCNC: 72 MG/DL (ref 30–150)
WBC # BLD AUTO: 4.88 K/UL (ref 3.9–12.7)

## 2024-11-22 PROCEDURE — 36415 COLL VENOUS BLD VENIPUNCTURE: CPT | Performed by: INTERNAL MEDICINE

## 2024-11-22 PROCEDURE — 80061 LIPID PANEL: CPT | Performed by: INTERNAL MEDICINE

## 2024-11-22 PROCEDURE — 99214 OFFICE O/P EST MOD 30 MIN: CPT | Mod: PBBFAC | Performed by: INTERNAL MEDICINE

## 2024-11-22 PROCEDURE — 99999 PR PBB SHADOW E&M-EST. PATIENT-LVL IV: CPT | Mod: PBBFAC,,, | Performed by: INTERNAL MEDICINE

## 2024-11-22 PROCEDURE — 85025 COMPLETE CBC W/AUTO DIFF WBC: CPT | Performed by: INTERNAL MEDICINE

## 2024-11-22 RX ORDER — ROMOSOZUMAB-AQQG 105 MG/1.17ML
INJECTION, SOLUTION SUBCUTANEOUS
COMMUNITY
Start: 2024-07-18

## 2024-11-22 RX ORDER — ZOLPIDEM TARTRATE 5 MG/1
5 TABLET ORAL NIGHTLY PRN
Qty: 30 TABLET | Refills: 1 | Status: SHIPPED | OUTPATIENT
Start: 2024-11-22 | End: 2025-05-23

## 2024-11-22 NOTE — PROGRESS NOTES
HPI  History of Present Illness    CHIEF COMPLAINT:  Martha presents for medication renewal, discussion of varicose veins, and a flu vaccine, as part of a general follow-up following recent hip surgery.    HPI:  Martha requests renewal for Zolpidem, reporting intermittent use with periods of up to 2 weeks without taking the medication. She attempted melatonin as an alternative but found it ineffective.  reviewed.  She recently underwent hip surgery with a follow-up visit 2 days ago. She reports healing ahead of schedule with no restrictions, having completed physical therapy and now exercising at home, including squats. Martha has some difficulty with bathtub access but manages with her garden tub. She expresses concern about falling.    Martha discusses recurrence of varicose veins, particularly on her legs, following treatments in 2006 at [facility]. She reports occasional swelling and protrusion of one vein. Martha had post-surgical bladder issues, including frequent urination every 2 hours at night. This improved during vacations when not consuming her 's strong coffee, but recurred upon resuming consumption, suggesting a correlation between coffee intake and bladder irritation.    Martha requests a flu vaccine today. She denies pain from varicose veins or any current restrictions or pain related to her recent hip surgery.    MEDICATIONS:  Martha is on Zolpidem as needed for sleep and Miralax regularly to avoid constipation.    MEDICAL HISTORY:  Martha has a history of varicose veins and venous insufficiency since 2006. She also has a history of constipation.    FAMILY HISTORY:  Family history is significant for brother with prostate cancer, diagnosed in September, who had his prostate removed. Another brother has a history of renal cancer and underwent a partial nephrectomy 2 years ago.    SURGICAL HISTORY:  Martha underwent hip surgery recently, within the last 3 months. She had varicose vein  treatments in 2006, performed by Dr. Sergei Damico.     TEST RESULTS:  Martha's blood count was low in the first week of September. A follow-up test 3 weeks later in late September showed the blood count was still slightly low.     ROS:  Gastrointestinal: +constipation  Genitourinary: +frequency  Musculoskeletal: -joint pain, +limb swelling             Review of Systems   Constitutional:  Negative for appetite change, chills and fever.   HENT:  Negative for nosebleeds and sore throat.    Eyes:  Negative for pain and visual disturbance.   Respiratory:  Negative for cough, shortness of breath and wheezing.    Cardiovascular:  Negative for chest pain and leg swelling.   Gastrointestinal:  Negative for abdominal pain, constipation and diarrhea.   Endocrine: Negative for polyuria.   Genitourinary:  Negative for difficulty urinating, hematuria and vaginal bleeding.   Musculoskeletal:  Positive for leg pain. Negative for arthralgias, back pain, gait problem and neck pain.   Integumentary:  Negative for pallor and rash.   Neurological:  Negative for tremors, seizures and headaches.   Hematological:  Does not bruise/bleed easily.   Psychiatric/Behavioral:  Negative for dysphoric mood. The patient is not nervous/anxious.        Past Medical History:   Diagnosis Date    Age-related osteoporosis with current pathological fracture     Aortic calcification 04/21/2023    Seen on X-ray chest 04/03/2020      [place]-Walker grade 1 cystocele 03/07/2024    Constipation - functional     Encounter for blood transfusion     History of COVID-19 10/27/2021    Kidney calculi     Melanoma     Mitral valve prolapse     Mixed stress and urge urinary incontinence 03/07/2024    Osteoporosis, unspecified     Primary insomnia 01/20/2016    Stroke     tia    Tenosynovitis, de Quervain 10/06/2021     Past Surgical History:   Procedure Laterality Date    APPENDECTOMY      COLON SURGERY      COLONOSCOPY      COLONOSCOPY N/A 12/15/2015    Procedure:  COLONOSCOPY;  Surgeon: ADALBERTO Liao MD;  Location: [facility] (4TH FLR);  Service: Endoscopy;  Laterality: N/A;    CYSTOSCOPY WITH CYSTOMETROGRAPHY N/A 3/7/2024    Procedure: CYSTOSCOPY, WITH CYSTOMETROGRAM;  Surgeon: Jabari Hager MD;  Location: [facility];  Service: Urology;  Laterality: N/A;    CYSTOSCOPY WITH URODYNAMIC TESTING N/A 3/7/2024    Procedure: CYSTOSCOPY, WITH URODYNAMIC TESTING;  Surgeon: Jabari Hager MD;  Location: [facility];  Service: Urology;  Laterality: N/A;    DE QUERVAIN'S RELEASE Right 10/6/2021    Procedure: RELEASE, HAND, FOR DEQUERVAIN'S TENOSYNOVITIS - right;  Surgeon: Aric Leonard MD;  Location: [facility];  Service: Orthopedics;  Laterality: Right;    ELECTROMYOGRAPHY N/A 3/7/2024    Procedure: EMG (ELECTROMYOGRAPHY);  Surgeon: Jabari Hager MD;  Location: [facility];  Service: Urology;  Laterality: N/A;    ESOPHAGOGASTRODUODENOSCOPY N/A 4/8/2020    Procedure: EGD (ESOPHAGOGASTRODUODENOSCOPY);  Surgeon: Jonathan Roth MD;  Location: [facility] (2ND FLR);  Service: Endoscopy;  Laterality: N/A;  check stat hct before     labs will be drawn prior-GT    HIP ARTHROPLASTY Right 8/28/2024    Procedure: ARTHROPLASTY, HIP;  Surgeon: Robbin Burnham MD;  Location: [facility] 2ND FLR;  Service: Orthopedics;  Laterality: Right;    HYSTERECTOMY      INTUSSUSCEPTION REPAIR      LASIK      LEFT COLECTOMY      melanoma on face removal      PARTIAL HYSTERECTOMY      UROFLOWMETRY N/A 3/7/2024    Procedure: UROFLOWMETRY;  Surgeon: Jabari Hager MD;  Location: [facility];  Service: Urology;  Laterality: N/A;    VOIDING URETHROCYSTOGRAPHY N/A 3/7/2024    Procedure: CYSTOURETHROGRAM, VOIDING;  Surgeon: Jabari Hager MD;  Location: [facility];  Service: Urology;  Laterality: N/A;      Patient Active Problem List   Diagnosis    Age-related osteoporosis with current pathological fracture    Hypercalciuria    Primary insomnia    Tenosynovitis, de Quervain    History of melanoma    Aortic  calcification    Mixed stress and urge urinary incontinence    [place]-Walker grade 1 cystocele    Closed displaced fracture of right femoral neck s/p right hemiarthroplasty 8/28    Postoperative anemia due to acute blood loss    Hypophosphatemia    Drug-induced constipation    Impaired functional mobility and activity tolerance    Hip weakness          Objective:      Physical Exam    Cardiovascular: Visible varicose veins.       Physical Exam  Constitutional:       General: She is not in acute distress.     Appearance: She is well-developed.   HENT:      Head: Normocephalic and atraumatic.      Right Ear: Tympanic membrane, ear canal and external ear normal.      Left Ear: Tympanic membrane, ear canal and external ear normal.      Mouth/Throat:      Pharynx: No oropharyngeal exudate or posterior oropharyngeal erythema.   Eyes:      General: No scleral icterus.     Conjunctiva/sclera: Conjunctivae normal.      Pupils: Pupils are equal, round, and reactive to light.   Neck:      Thyroid: No thyromegaly.   Cardiovascular:      Rate and Rhythm: Normal rate and regular rhythm.      Pulses: Normal pulses.      Heart sounds: No murmur heard.     Comments: Distal leg varicose veins  Pulmonary:      Effort: Pulmonary effort is normal.      Breath sounds: Normal breath sounds. No wheezing.   Abdominal:      General: Bowel sounds are normal. There is no distension.      Palpations: Abdomen is soft.      Tenderness: There is no abdominal tenderness.   Musculoskeletal:         General: No tenderness.      Cervical back: Normal range of motion and neck supple.      Right lower leg: No edema.      Left lower leg: No edema.   Lymphadenopathy:      Cervical: No cervical adenopathy.   Skin:     Coloration: Skin is not jaundiced.      Findings: No rash.   Neurological:      General: No focal deficit present.      Mental Status: She is alert and oriented to person, place, and time.   Psychiatric:         Mood and Affect: Mood normal.    "      Behavior: Behavior normal.           Assessment:       Problem List Items Addressed This Visit    None  Visit Diagnoses       Varicose veins of both lower extremities with pain    -  Primary    Relevant Orders    Ambulatory referral/consult to Vascular Surgery    Insomnia, unspecified type        Relevant Medications    zolpidem (AMBIEN) 5 MG Tab    Travel advice encounter        Relevant Medications    zolpidem (AMBIEN) 5 MG Tab    Anemia, unspecified type        Relevant Orders    CBC Auto Differential    Dyslipidemia        Relevant Orders    LIPID PANEL            Plan:       Martha was seen today for annual exam.    Diagnoses and all orders for this visit:    Varicose veins of both lower extremities with pain  -     Ambulatory referral/consult to Vascular Surgery; Future    Insomnia, unspecified type  -     zolpidem (AMBIEN) 5 MG Tab; Take 1 tablet (5 mg total) by mouth nightly as needed (insomnia).    Travel advice encounter  -     zolpidem (AMBIEN) 5 MG Tab; Take 1 tablet (5 mg total) by mouth nightly as needed (insomnia).    Anemia, unspecified type  -     CBC Auto Differential; Future    Dyslipidemia  -     LIPID PANEL; Future          reviewed.     As this patient's PCP, I am actively managing and/or treating their chronic medical conditions including INsomnia and have been for at least 1 year. This includes, but is not limited to, medication management, coordination of care, documentation review from their specialists and labs/imaging review where pertinent.        Portions of this note may have been created with [employer] voice recognition software. Occasional "wrong-word" or "sound-a-like" substitutions may have occurred due to the inherent limitations of voice recognition software. Please, read the note carefully and recognize, using context, where substitutions have occurred.  "

## 2024-11-26 ENCOUNTER — PATIENT MESSAGE (OUTPATIENT)
Dept: INTERNAL MEDICINE | Facility: CLINIC | Age: 69
End: 2024-11-26
Payer: MEDICARE

## 2024-11-26 ENCOUNTER — INFUSION (OUTPATIENT)
Dept: INFECTIOUS DISEASES | Facility: HOSPITAL | Age: 69
End: 2024-11-26
Payer: MEDICARE

## 2024-11-26 VITALS
SYSTOLIC BLOOD PRESSURE: 136 MMHG | BODY MASS INDEX: 19.56 KG/M2 | DIASTOLIC BLOOD PRESSURE: 63 MMHG | HEIGHT: 71 IN | RESPIRATION RATE: 18 BRPM | WEIGHT: 139.75 LBS | OXYGEN SATURATION: 97 % | TEMPERATURE: 98 F | HEART RATE: 77 BPM

## 2024-11-26 DIAGNOSIS — R82.994 HYPERCALCIURIA: Primary | ICD-10-CM

## 2024-11-26 DIAGNOSIS — M80.00XD AGE-RELATED OSTEOPOROSIS WITH CURRENT PATHOLOGICAL FRACTURE WITH ROUTINE HEALING, SUBSEQUENT ENCOUNTER: ICD-10-CM

## 2024-11-26 PROCEDURE — 96372 THER/PROPH/DIAG INJ SC/IM: CPT

## 2024-11-26 PROCEDURE — 63600175 PHARM REV CODE 636 W HCPCS: Mod: JZ,JG | Performed by: INTERNAL MEDICINE

## 2024-11-26 RX ADMIN — ROMOSOZUMAB-AQQG 210 MG: 105 INJECTION, SOLUTION SUBCUTANEOUS at 02:11

## 2024-12-26 ENCOUNTER — INFUSION (OUTPATIENT)
Dept: INFECTIOUS DISEASES | Facility: HOSPITAL | Age: 69
End: 2024-12-26
Attending: PHYSICIAN ASSISTANT
Payer: MEDICARE

## 2024-12-26 VITALS
OXYGEN SATURATION: 97 % | HEART RATE: 65 BPM | DIASTOLIC BLOOD PRESSURE: 67 MMHG | BODY MASS INDEX: 19.45 KG/M2 | RESPIRATION RATE: 20 BRPM | WEIGHT: 143.63 LBS | SYSTOLIC BLOOD PRESSURE: 145 MMHG | HEIGHT: 72 IN | TEMPERATURE: 98 F

## 2024-12-26 DIAGNOSIS — M80.00XD AGE-RELATED OSTEOPOROSIS WITH CURRENT PATHOLOGICAL FRACTURE WITH ROUTINE HEALING, SUBSEQUENT ENCOUNTER: ICD-10-CM

## 2024-12-26 DIAGNOSIS — R82.994 HYPERCALCIURIA: Primary | ICD-10-CM

## 2024-12-26 PROCEDURE — 96372 THER/PROPH/DIAG INJ SC/IM: CPT

## 2024-12-26 PROCEDURE — 63600175 PHARM REV CODE 636 W HCPCS: Mod: JG | Performed by: INTERNAL MEDICINE

## 2024-12-26 RX ADMIN — ROMOSOZUMAB-AQQG 210 MG: 105 INJECTION, SOLUTION SUBCUTANEOUS at 11:12

## 2025-01-14 ENCOUNTER — PATIENT MESSAGE (OUTPATIENT)
Dept: ENDOCRINOLOGY | Facility: CLINIC | Age: 70
End: 2025-01-14
Payer: MEDICARE

## 2025-01-24 ENCOUNTER — OFFICE VISIT (OUTPATIENT)
Dept: INTERNAL MEDICINE | Facility: CLINIC | Age: 70
End: 2025-01-24
Payer: MEDICARE

## 2025-01-24 ENCOUNTER — LAB VISIT (OUTPATIENT)
Dept: LAB | Facility: HOSPITAL | Age: 70
End: 2025-01-24
Attending: INTERNAL MEDICINE
Payer: MEDICARE

## 2025-01-24 ENCOUNTER — PATIENT MESSAGE (OUTPATIENT)
Dept: INTERNAL MEDICINE | Facility: CLINIC | Age: 70
End: 2025-01-24
Payer: MEDICARE

## 2025-01-24 VITALS
BODY MASS INDEX: 18.84 KG/M2 | HEIGHT: 72 IN | HEART RATE: 89 BPM | WEIGHT: 139.13 LBS | DIASTOLIC BLOOD PRESSURE: 74 MMHG | OXYGEN SATURATION: 98 % | SYSTOLIC BLOOD PRESSURE: 134 MMHG

## 2025-01-24 DIAGNOSIS — R00.2 PALPITATIONS: ICD-10-CM

## 2025-01-24 DIAGNOSIS — R07.9 CHEST PAIN, UNSPECIFIED TYPE: Primary | ICD-10-CM

## 2025-01-24 DIAGNOSIS — I70.0 AORTIC CALCIFICATION: ICD-10-CM

## 2025-01-24 DIAGNOSIS — M80.00XA AGE-RELATED OSTEOPOROSIS WITH CURRENT PATHOLOGICAL FRACTURE, INITIAL ENCOUNTER: ICD-10-CM

## 2025-01-24 DIAGNOSIS — R07.9 CHEST PAIN, UNSPECIFIED TYPE: ICD-10-CM

## 2025-01-24 LAB
BASOPHILS # BLD AUTO: 0.03 K/UL (ref 0–0.2)
BASOPHILS NFR BLD: 0.6 % (ref 0–1.9)
DIFFERENTIAL METHOD BLD: NORMAL
EOSINOPHIL # BLD AUTO: 0.1 K/UL (ref 0–0.5)
EOSINOPHIL NFR BLD: 1.7 % (ref 0–8)
ERYTHROCYTE [DISTWIDTH] IN BLOOD BY AUTOMATED COUNT: 13.2 % (ref 11.5–14.5)
HCT VFR BLD AUTO: 39.5 % (ref 37–48.5)
HGB BLD-MCNC: 12.7 G/DL (ref 12–16)
IMM GRANULOCYTES # BLD AUTO: 0.01 K/UL (ref 0–0.04)
IMM GRANULOCYTES NFR BLD AUTO: 0.2 % (ref 0–0.5)
LYMPHOCYTES # BLD AUTO: 1 K/UL (ref 1–4.8)
LYMPHOCYTES NFR BLD: 21.3 % (ref 18–48)
MCH RBC QN AUTO: 28.9 PG (ref 27–31)
MCHC RBC AUTO-ENTMCNC: 32.2 G/DL (ref 32–36)
MCV RBC AUTO: 90 FL (ref 82–98)
MONOCYTES # BLD AUTO: 0.4 K/UL (ref 0.3–1)
MONOCYTES NFR BLD: 8.5 % (ref 4–15)
NEUTROPHILS # BLD AUTO: 3.3 K/UL (ref 1.8–7.7)
NEUTROPHILS NFR BLD: 67.7 % (ref 38–73)
NRBC BLD-RTO: 0 /100 WBC
OHS QRS DURATION: 96 MS
OHS QTC CALCULATION: 444 MS
PLATELET # BLD AUTO: 224 K/UL (ref 150–450)
PMV BLD AUTO: 10.9 FL (ref 9.2–12.9)
RBC # BLD AUTO: 4.39 M/UL (ref 4–5.4)
WBC # BLD AUTO: 4.83 K/UL (ref 3.9–12.7)

## 2025-01-24 PROCEDURE — 99215 OFFICE O/P EST HI 40 MIN: CPT | Mod: S$PBB,,, | Performed by: INTERNAL MEDICINE

## 2025-01-24 PROCEDURE — 93005 ELECTROCARDIOGRAM TRACING: CPT | Mod: PBBFAC | Performed by: INTERNAL MEDICINE

## 2025-01-24 PROCEDURE — 84443 ASSAY THYROID STIM HORMONE: CPT | Performed by: INTERNAL MEDICINE

## 2025-01-24 PROCEDURE — 36415 COLL VENOUS BLD VENIPUNCTURE: CPT | Performed by: INTERNAL MEDICINE

## 2025-01-24 PROCEDURE — 80048 BASIC METABOLIC PNL TOTAL CA: CPT | Performed by: INTERNAL MEDICINE

## 2025-01-24 PROCEDURE — G2211 COMPLEX E/M VISIT ADD ON: HCPCS | Mod: S$PBB,,, | Performed by: INTERNAL MEDICINE

## 2025-01-24 PROCEDURE — 93010 ELECTROCARDIOGRAM REPORT: CPT | Mod: S$PBB,,, | Performed by: INTERNAL MEDICINE

## 2025-01-24 PROCEDURE — 85025 COMPLETE CBC W/AUTO DIFF WBC: CPT | Performed by: INTERNAL MEDICINE

## 2025-01-24 PROCEDURE — 99999 PR PBB SHADOW E&M-EST. PATIENT-LVL IV: CPT | Mod: PBBFAC,,, | Performed by: INTERNAL MEDICINE

## 2025-01-24 PROCEDURE — 99214 OFFICE O/P EST MOD 30 MIN: CPT | Mod: PBBFAC,25 | Performed by: INTERNAL MEDICINE

## 2025-01-24 NOTE — PROGRESS NOTES
Chest Pain   Associated symptoms include palpitations. Pertinent negatives include no abdominal pain or cough.   History of Present Illness    CHIEF COMPLAINT:  Martha presents with concerns about chest pain, pressure, and palpitations, particularly at night, which have been occurring for several months.    HPI:  Martha reports chest pain and pressure that began after a fall resulting in hip surgery in September. The symptoms have persisted for several months, with nocturnal awakenings due to chest pain. The pressure is described as constant, with occasional sharp pains that fluctuate. These symptoms typically occur when attempting to sleep at night, along with palpitations and a sensation of tachycardia, sometimes resonating through the ears.    To alleviate symptoms, the patient elevates her head with pillows or sleeps in a recliner, which eases discomfort and facilitates sleep resumption. The episodes are not nightly but have increased in frequency over the past 2 weeks. Martha notes that changing positions in bed can trigger tachycardia lasting approximately 90 seconds. Specifically she does not think this is Gastro related.     Martha also reports excessive eructation during the day, sometimes lasting for 30 minutes, which is atypical for her. The eructation is not associated with the chest pain.    Martha's blood pressure has been increasing since September, with recent readings averaging 155/85 in the morning, compared to her usual 120/65-70. She has been monitoring her blood pressure regularly and noticed a gradual increase over the past few months.    Martha initiated Evenity (denosumab) in July, with the second injection administered just before the fall that resulted in a hip fracture. The onset of symptoms occurred approximately 6 weeks after starting Evenity, leading the patient to question a potential connection. She contacted her endocrinologist, Dr. Nails, regarding potential side effects but  received limited feedback.    Martha reports that her Fitbit shows heart rates of 111-120 bpm when standing during the day, which decreases to 65-70 bpm when sitting. She mentions not wearing the Fitbit at night when the symptoms typically occur.    Martha denies these symptoms with physical activity or exercise. She also notes that her diet has not changed and is generally healthy, with low sodium intake.    MEDICATIONS:  Martha started Evenity in July, with the second dose administered monthly, taken right before her hip fracture. She is also on Ambien as needed for sleep.    MEDICAL HISTORY:  Martha has a history of mitral valve prolapse, diagnosed in the 1980s based on an echocardiogram.    SURGICAL HISTORY:  She underwent hip surgery in September, though the specific year was not specified.    An EKG was performed in September, with results within normal limits.    IMAGING:  Martha recalls having an echocardiogram in the 1980s, which showed mitral valve prolapse.    SOCIAL HISTORY:  Martha drinks coffee occasionally.      ROS:  Cardiovascular: +chest pain, +palpitations  Psychiatric: +sleep difficulty             Review of Systems   Respiratory:  Negative for cough, chest tightness and wheezing.    Cardiovascular:  Positive for chest pain and palpitations.   Gastrointestinal:  Negative for abdominal pain.   Musculoskeletal:  Positive for arthralgias.       Past Medical History:   Diagnosis Date    Age-related osteoporosis with current pathological fracture     Aortic calcification 04/21/2023    Seen on X-ray chest 04/03/2020      [place]-Walker grade 1 cystocele 03/07/2024    Constipation - functional     Encounter for blood transfusion     History of COVID-19 10/27/2021    Kidney calculi     Melanoma     Mitral valve prolapse     Mixed stress and urge urinary incontinence 03/07/2024    Osteoporosis, unspecified     Primary insomnia 01/20/2016    Stroke     tia    Tenosynovitis, de Quervain 10/06/2021      Past Surgical History:   Procedure Laterality Date    APPENDECTOMY      COLON SURGERY      COLONOSCOPY      COLONOSCOPY N/A 12/15/2015    Procedure: COLONOSCOPY;  Surgeon: ADALBERTO Liao MD;  Location: [facility] (4TH FLR);  Service: Endoscopy;  Laterality: N/A;    CYSTOSCOPY WITH CYSTOMETROGRAPHY N/A 3/7/2024    Procedure: CYSTOSCOPY, WITH CYSTOMETROGRAM;  Surgeon: Jabari Hager MD;  Location: [facility];  Service: Urology;  Laterality: N/A;    CYSTOSCOPY WITH URODYNAMIC TESTING N/A 3/7/2024    Procedure: CYSTOSCOPY, WITH URODYNAMIC TESTING;  Surgeon: Jabari Hager MD;  Location: [facility];  Service: Urology;  Laterality: N/A;    DE QUERVAIN'S RELEASE Right 10/6/2021    Procedure: RELEASE, HAND, FOR DEQUERVAIN'S TENOSYNOVITIS - right;  Surgeon: Aric Leonard MD;  Location: [facility];  Service: Orthopedics;  Laterality: Right;    ELECTROMYOGRAPHY N/A 3/7/2024    Procedure: EMG (ELECTROMYOGRAPHY);  Surgeon: Jabari Hager MD;  Location: [facility];  Service: Urology;  Laterality: N/A;    ESOPHAGOGASTRODUODENOSCOPY N/A 4/8/2020    Procedure: EGD (ESOPHAGOGASTRODUODENOSCOPY);  Surgeon: Jonathan Roth MD;  Location: [facility] (2ND FLR);  Service: Endoscopy;  Laterality: N/A;  check stat hct before     labs will be drawn prior-GT    HIP ARTHROPLASTY Right 8/28/2024    Procedure: ARTHROPLASTY, HIP;  Surgeon: Robbin Burnham MD;  Location: [facility] 2ND FLR;  Service: Orthopedics;  Laterality: Right;    HYSTERECTOMY      INTUSSUSCEPTION REPAIR      LASIK      LEFT COLECTOMY      melanoma on face removal      PARTIAL HYSTERECTOMY      UROFLOWMETRY N/A 3/7/2024    Procedure: UROFLOWMETRY;  Surgeon: Jabari Hager MD;  Location: [facility];  Service: Urology;  Laterality: N/A;    VOIDING URETHROCYSTOGRAPHY N/A 3/7/2024    Procedure: CYSTOURETHROGRAM, VOIDING;  Surgeon: Jabari Hager MD;  Location: [facility];  Service: Urology;  Laterality: N/A;      Patient Active Problem List   Diagnosis    Age-related  osteoporosis with current pathological fracture    Hypercalciuria    Primary insomnia    Tenosynovitis, de Quervain    History of melanoma    Aortic calcification    Mixed stress and urge urinary incontinence    [place]-Walker grade 1 cystocele    Closed displaced fracture of right femoral neck s/p right hemiarthroplasty 8/28    Postoperative anemia due to acute blood loss    Hypophosphatemia    Drug-induced constipation    Impaired functional mobility and activity tolerance    Hip weakness          Objective:      Physical Exam    Vitals: Blood pressure: 138/72. Blood pressure: 155/85.       Physical Exam  Constitutional:       General: She is not in acute distress.     Appearance: She is well-developed.   HENT:      Head: Normocephalic and atraumatic.      Right Ear: Tympanic membrane, ear canal and external ear normal.      Left Ear: Tympanic membrane, ear canal and external ear normal.      Mouth/Throat:      Pharynx: No oropharyngeal exudate or posterior oropharyngeal erythema.   Eyes:      General: No scleral icterus.     Conjunctiva/sclera: Conjunctivae normal.      Pupils: Pupils are equal, round, and reactive to light.   Neck:      Thyroid: No thyromegaly.   Cardiovascular:      Rate and Rhythm: Normal rate and regular rhythm.      Pulses: Normal pulses.   Pulmonary:      Effort: Pulmonary effort is normal.      Breath sounds: Normal breath sounds. No wheezing.   Abdominal:      General: Bowel sounds are normal. There is no distension.      Palpations: Abdomen is soft.      Tenderness: There is no abdominal tenderness.   Musculoskeletal:         General: No tenderness.      Cervical back: Normal range of motion and neck supple.      Right lower leg: No edema.      Left lower leg: No edema.   Lymphadenopathy:      Cervical: No cervical adenopathy.   Skin:     Coloration: Skin is not jaundiced.      Findings: No rash.   Neurological:      General: No focal deficit present.      Mental Status: She is alert and  oriented to person, place, and time.   Psychiatric:         Mood and Affect: Mood normal.         Behavior: Behavior normal.           Assessment:       Problem List Items Addressed This Visit          Cardiac/Vascular    Aortic calcification    Relevant Orders    CBC Auto Differential    Basic Metabolic Panel    TSH       Orthopedic    Age-related osteoporosis with current pathological fracture    Relevant Orders    CBC Auto Differential    Basic Metabolic Panel    TSH     Other Visit Diagnoses       Chest pain, unspecified type    -  Primary    Relevant Orders    Exercise Stress - EKG    Holter monitor - 48 hour    EKG 12-lead (Completed)    CBC Auto Differential    Basic Metabolic Panel    TSH    Palpitations        Relevant Orders    Exercise Stress - EKG    Holter monitor - 48 hour    EKG 12-lead (Completed)    CBC Auto Differential    Basic Metabolic Panel    TSH            Plan:       Martha was seen today for chest pain.    Diagnoses and all orders for this visit:    Chest pain, unspecified type  -     Exercise Stress - EKG; Future  -     Holter monitor - 48 hour; Future  -     EKG 12-lead  -     CBC Auto Differential; Future  -     Basic Metabolic Panel; Future  -     TSH; Future    Palpitations  -     Exercise Stress - EKG; Future  -     Holter monitor - 48 hour; Future  -     EKG 12-lead  -     CBC Auto Differential; Future  -     Basic Metabolic Panel; Future  -     TSH; Future    Aortic calcification  Comments:  Seen on X-ray chest 04/03/2020. Continue to monitor Lipids  Orders:  -     CBC Auto Differential; Future  -     Basic Metabolic Panel; Future  -     TSH; Future    Age-related osteoporosis with current pathological fracture, initial encounter  -     CBC Auto Differential; Future  -     Basic Metabolic Panel; Future  -     TSH; Future       Long talk patient.  In general I feel that her heart rate is a little higher than she typically runs at least looking through her vitals listed in the chart.  " We will assess studies including cardiac studies and labs and follow-up.  She will report any acute changes in the short term.  ER precautions discussed    As this patient's PCP, I am actively managing and/or treating their chronic medical conditions including Osteoporosis and have been for at least 1 year. This includes, but is not limited to, medication management, coordination of care, documentation review from their specialists and labs/imaging review where pertinent.        Portions of this note may have been created with [employer] voice recognition software. Occasional "wrong-word" or "sound-a-like" substitutions may have occurred due to the inherent limitations of voice recognition software. Please, read the note carefully and recognize, using context, where substitutions have occurred.  "

## 2025-01-25 ENCOUNTER — PATIENT MESSAGE (OUTPATIENT)
Dept: INTERNAL MEDICINE | Facility: CLINIC | Age: 70
End: 2025-01-25
Payer: MEDICARE

## 2025-01-25 LAB
ANION GAP SERPL CALC-SCNC: 13 MMOL/L (ref 8–16)
BUN SERPL-MCNC: 15 MG/DL (ref 8–23)
CALCIUM SERPL-MCNC: 10 MG/DL (ref 8.7–10.5)
CHLORIDE SERPL-SCNC: 104 MMOL/L (ref 95–110)
CO2 SERPL-SCNC: 24 MMOL/L (ref 23–29)
CREAT SERPL-MCNC: 0.7 MG/DL (ref 0.5–1.4)
EST. GFR  (NO RACE VARIABLE): >60 ML/MIN/1.73 M^2
GLUCOSE SERPL-MCNC: 108 MG/DL (ref 70–110)
POTASSIUM SERPL-SCNC: 3.8 MMOL/L (ref 3.5–5.1)
SODIUM SERPL-SCNC: 141 MMOL/L (ref 136–145)
TSH SERPL DL<=0.005 MIU/L-ACNC: 1.41 UIU/ML (ref 0.4–4)

## 2025-01-30 ENCOUNTER — PATIENT MESSAGE (OUTPATIENT)
Dept: INTERNAL MEDICINE | Facility: CLINIC | Age: 70
End: 2025-01-30
Payer: MEDICARE

## 2025-01-30 DIAGNOSIS — I49.9 CARDIAC ARRHYTHMIA, UNSPECIFIED CARDIAC ARRHYTHMIA TYPE: ICD-10-CM

## 2025-01-30 DIAGNOSIS — R94.39 ABNORMAL STRESS TEST: Primary | ICD-10-CM

## 2025-02-05 ENCOUNTER — PATIENT MESSAGE (OUTPATIENT)
Dept: INTERNAL MEDICINE | Facility: CLINIC | Age: 70
End: 2025-02-05
Payer: MEDICARE

## 2025-02-06 ENCOUNTER — OFFICE VISIT (OUTPATIENT)
Dept: CARDIOLOGY | Facility: CLINIC | Age: 70
End: 2025-02-06
Payer: MEDICARE

## 2025-02-06 VITALS
WEIGHT: 138.88 LBS | HEIGHT: 72 IN | HEART RATE: 76 BPM | SYSTOLIC BLOOD PRESSURE: 119 MMHG | BODY MASS INDEX: 18.81 KG/M2 | DIASTOLIC BLOOD PRESSURE: 64 MMHG

## 2025-02-06 DIAGNOSIS — R00.0 WIDE-COMPLEX TACHYCARDIA: ICD-10-CM

## 2025-02-06 DIAGNOSIS — I49.9 CARDIAC ARRHYTHMIA, UNSPECIFIED CARDIAC ARRHYTHMIA TYPE: Primary | ICD-10-CM

## 2025-02-06 DIAGNOSIS — R07.89 ATYPICAL CHEST PAIN: ICD-10-CM

## 2025-02-06 DIAGNOSIS — R94.39 ABNORMAL STRESS TEST: ICD-10-CM

## 2025-02-06 PROCEDURE — 99204 OFFICE O/P NEW MOD 45 MIN: CPT | Mod: S$PBB,,, | Performed by: INTERNAL MEDICINE

## 2025-02-06 PROCEDURE — 99999 PR PBB SHADOW E&M-EST. PATIENT-LVL IV: CPT | Mod: PBBFAC,,, | Performed by: INTERNAL MEDICINE

## 2025-02-06 PROCEDURE — 99214 OFFICE O/P EST MOD 30 MIN: CPT | Mod: PBBFAC | Performed by: INTERNAL MEDICINE

## 2025-02-06 RX ORDER — METOPROLOL SUCCINATE 25 MG/1
25 TABLET, EXTENDED RELEASE ORAL DAILY
Qty: 30 TABLET | Refills: 11 | Status: SHIPPED | OUTPATIENT
Start: 2025-02-06 | End: 2026-02-06

## 2025-02-06 NOTE — PROGRESS NOTES
Subjective:   Patient ID:  Martha Mcfarlane is a 69 y.o. female who presents for evaluation of Chest Pain and Palpitations      HPI: Very pleasant woman here for evaluation as above.  She's noticed sharp left-sided chest wall stabbing pain at night when she lay in bed.  They last maybe 5 seconds.  She's also noticed some palpitations in a similar setting.    She had a treadmill test performed at [place] she had what appeared to be a 4 second run of wide complex tachycardia.  She was asymptomatic.     In every other way, she feels well.  No angina, syncope, PND/orthopnea.        Past Medical History:   Diagnosis Date    Age-related osteoporosis with current pathological fracture     Aortic calcification 04/21/2023    Seen on X-ray chest 04/03/2020      [place]-Walker grade 1 cystocele 03/07/2024    Constipation - functional     Encounter for blood transfusion     History of COVID-19 10/27/2021    Kidney calculi     Melanoma     Mitral valve prolapse     Mixed stress and urge urinary incontinence 03/07/2024    Osteoporosis, unspecified     Primary insomnia 01/20/2016    Stroke     tia    Tenosynovitis, de Quervain 10/06/2021       Past Surgical History:   Procedure Laterality Date    APPENDECTOMY      COLON SURGERY      COLONOSCOPY      COLONOSCOPY N/A 12/15/2015    Procedure: COLONOSCOPY;  Surgeon: ADALBERTO Liao MD;  Location: [address];  Service: Endoscopy;  Laterality: N/A;    CYSTOSCOPY WITH CYSTOMETROGRAPHY N/A 3/7/2024    Procedure: CYSTOSCOPY, WITH CYSTOMETROGRAM;  Surgeon: Jabari Hager MD;  Location: [facility];  Service: Urology;  Laterality: N/A;    CYSTOSCOPY WITH URODYNAMIC TESTING N/A 3/7/2024    Procedure: CYSTOSCOPY, WITH URODYNAMIC TESTING;  Surgeon: Jabari Hager MD;  Location: [facility] OR;  Service: Urology;  Laterality: N/A;    DE QUERVAIN'S RELEASE Right 10/6/2021    Procedure: RELEASE, HAND, FOR DEQUERVAIN'S TENOSYNOVITIS - right;  Surgeon: Aric Leonard MD;   Location: [facility] OR;  Service: Orthopedics;  Laterality: Right;    ELECTROMYOGRAPHY N/A 3/7/2024    Procedure: EMG (ELECTROMYOGRAPHY);  Surgeon: Jabari Hager MD;  Location: [facility] OR;  Service: Urology;  Laterality: N/A;    ESOPHAGOGASTRODUODENOSCOPY N/A 4/8/2020    Procedure: EGD (ESOPHAGOGASTRODUODENOSCOPY);  Surgeon: Jonathan Roth MD;  Location: [facility] ENDO (2ND FLR);  Service: Endoscopy;  Laterality: N/A;  check stat hct before     labs will be drawn prior-GT    HIP ARTHROPLASTY Right 8/28/2024    Procedure: ARTHROPLASTY, HIP;  Surgeon: Robbin Burnham MD;  [address];  Service: Orthopedics;  Laterality: Right;    HYSTERECTOMY      INTUSSUSCEPTION REPAIR      LASIK      LEFT COLECTOMY      melanoma on face removal      PARTIAL HYSTERECTOMY      UROFLOWMETRY N/A 3/7/2024    Procedure: UROFLOWMETRY;  Surgeon: Jabari Hager MD;  Location: [facility] OR;  Service: Urology;  Laterality: N/A;    VOIDING URETHROCYSTOGRAPHY N/A 3/7/2024    Procedure: CYSTOURETHROGRAM, VOIDING;  Surgeon: Jabari Hager MD;  Location: [facility] OR;  Service: Urology;  Laterality: N/A;       Social History     Tobacco Use    Smoking status: Never    Smokeless tobacco: Never   Substance Use Topics    Alcohol use: Never    Drug use: Never       Family History   Problem Relation Name Age of Onset    Hepatitis Mother      Diabetes Father      Diabetes Brother Goran     Parkinsonism Brother Goran     Hyperlipidemia Brother Marc     Hypertension Brother Marc     Diabetes Brother Marc     Hyperlipidemia Brother Kraig     Hypertension Brother Kraig     Prostate cancer Brother Kraig     Heart disease Brother Faizan     Autism Son         Current Outpatient Medications   Medication Sig    ascorbic acid, vitamin C, (VITAMIN C) 500 MG tablet Take 500 mg by mouth once daily.    CALCIUM CARBONATE/VITAMIN D3 (VITAMIN D-3 ORAL) Take 1 tablet by mouth Daily.    fish oil-omega-3 fatty acids 300-1,000 mg capsule Take 2 g by mouth  once daily.    magnesium 30 mg Tab Take 1 tablet by mouth once daily.     polyethylene glycol (MIRALAX) 17 gram/dose powder Take 17 g by mouth once daily.    romosozumab-aqqg (EVENITY) 105 mg/1.17 mL     zolpidem (AMBIEN) 5 MG Tab Take 1 tablet (5 mg total) by mouth nightly as needed (insomnia).    estradioL (ESTRACE) 0.01 % (0.1 mg/gram) vaginal cream Place 1 g vaginally every 7 days.     No current facility-administered medications for this visit.       Review of patient's allergies indicates:   Allergen Reactions    Orange flavor      Severe headache, nausea    Iodine and iodide containing products     Shellfish containing products        ROS  The review of systems is negative except as above.    Objective:   Physical Exam  Vitals reviewed.   Constitutional:       Appearance: She is well-developed.   HENT:      Head: Normocephalic and atraumatic.   Eyes:      General: No scleral icterus.     Conjunctiva/sclera: Conjunctivae normal.   Neck:      Vascular: No JVD.   Cardiovascular:      Rate and Rhythm: Normal rate and regular rhythm.      Pulses: Intact distal pulses.      Heart sounds: Normal heart sounds. No murmur heard.     No friction rub. No gallop.   Pulmonary:      Effort: Pulmonary effort is normal.      Breath sounds: Normal breath sounds. No wheezing or rales.   Abdominal:      General: Bowel sounds are normal. There is no distension.      Palpations: Abdomen is soft.      Tenderness: There is no abdominal tenderness.   Musculoskeletal:         General: Normal range of motion.      Cervical back: Normal range of motion and neck supple.   Skin:     General: Skin is warm and dry.      Findings: No erythema or rash.   Neurological:      Mental Status: She is alert and oriented to person, place, and time.   Psychiatric:         Behavior: Behavior normal.         Thought Content: Thought content normal.         Judgment: Judgment normal.         Lab Results   Component Value Date    WBC 4.83 01/24/2025     HGB 12.7 01/24/2025    HCT 39.5 01/24/2025    MCV 90 01/24/2025     01/24/2025         Chemistry        Component Value Date/Time     01/24/2025 1500    K 3.8 01/24/2025 1500     01/24/2025 1500    CO2 24 01/24/2025 1500    BUN 15 01/24/2025 1500    CREATININE 0.7 01/24/2025 1500     01/24/2025 1500        Component Value Date/Time    CALCIUM 10.0 01/24/2025 1500    ALKPHOS 106 08/27/2024 1245    AST 24 08/27/2024 1245    ALT 20 08/27/2024 1245    BILITOT 0.8 08/27/2024 1245    ESTGFRAFRICA >60.0 10/27/2021 0916    EGFRNONAA >60.0 10/27/2021 0916            Lab Results   Component Value Date    CHOL 164 11/22/2024    CHOL 145 10/25/2023    CHOL 155 11/02/2022     Lab Results   Component Value Date    HDL 59 11/22/2024    HDL 50 10/25/2023    HDL 51 11/02/2022     Lab Results   Component Value Date    LDLCALC 90.6 11/22/2024    LDLCALC 85.0 10/25/2023    LDLCALC 92.4 11/02/2022     Lab Results   Component Value Date    TRIG 72 11/22/2024    TRIG 50 10/25/2023    TRIG 58 11/02/2022     Lab Results   Component Value Date    CHOLHDL 36.0 11/22/2024    CHOLHDL 34.5 10/25/2023    CHOLHDL 32.9 11/02/2022       Lab Results   Component Value Date    TSH 1.414 01/24/2025       Lab Results   Component Value Date    HGBA1C 5.0 08/27/2024         Assessment:     1. Atypical chest pain    2. Abnormal stress test    3. Cardiac arrhythmia, unspecified cardiac arrhythmia type        Plan:     Echo and event monitor    Continue current medicines.    Diet/exercise goals reinforced.    F/U 6 months

## 2025-02-14 ENCOUNTER — HOSPITAL ENCOUNTER (OUTPATIENT)
Dept: CARDIOLOGY | Facility: HOSPITAL | Age: 70
Discharge: HOME | End: 2025-02-14
Attending: INTERNAL MEDICINE
Payer: MEDICARE

## 2025-02-14 VITALS
HEIGHT: 71 IN | BODY MASS INDEX: 19.44 KG/M2 | WEIGHT: 138.88 LBS | SYSTOLIC BLOOD PRESSURE: 119 MMHG | HEART RATE: 76 BPM | DIASTOLIC BLOOD PRESSURE: 64 MMHG

## 2025-02-14 DIAGNOSIS — R00.0 WIDE-COMPLEX TACHYCARDIA: ICD-10-CM

## 2025-02-14 DIAGNOSIS — R07.89 ATYPICAL CHEST PAIN: ICD-10-CM

## 2025-02-14 LAB
ASCENDING AORTA: 3.02 CM
AV AREA BY CONTINUOUS VTI: 3 CM2
AV INDEX (PROSTH): 0.77
AV LVOT MEAN GRADIENT: 2 MMHG
AV LVOT PEAK GRADIENT: 4 MMHG
AV MEAN GRADIENT: 3 MMHG
AV PEAK GRADIENT: 6 MMHG
AV VALVE AREA BY VELOCITY RATIO: 3.2 CM²
AV VALVE AREA: 2.9 CM2
AV VELOCITY RATIO: 0.83
BSA FOR ECHO PROCEDURE: 1.77 M2
CV ECHO LV RWT: 0.38 CM
DOP CALC AO PEAK VEL: 1.2 M/S
DOP CALC AO VTI: 29.1 CM
DOP CALC LVOT AREA: 3.8 CM2
DOP CALC LVOT DIAMETER: 2.2 CM
DOP CALC LVOT PEAK VEL: 1 M/S
DOP CALC LVOT STROKE VOLUME: 85.5 CM3
DOP CALCLVOT PEAK VEL VTI: 22.5 CM
E WAVE DECELERATION TIME: 267 MS
E/A RATIO: 1.11
E/E' RATIO: 11 M/S
ECHO EF ESTIMATED: 74 %
ECHO LV POSTERIOR WALL: 0.9 CM (ref 0.6–1.1)
EJECTION FRACTION: 65 %
FRACTIONAL SHORTENING: 42.6 % (ref 28–44)
INTERVENTRICULAR SEPTUM: 0.7 CM (ref 0.6–1.1)
IVC DIAMETER: 1.38 CM
IVRT: 118 MS
LA MAJOR: 4.5 CM
LA MINOR: 4.8 CM
LA WIDTH: 3.7 CM
LEFT ATRIUM SIZE: 3.8 CM
LEFT ATRIUM VOLUME INDEX MOD: 21 ML/M2
LEFT ATRIUM VOLUME INDEX: 31 ML/M2
LEFT ATRIUM VOLUME MOD: 37 ML
LEFT ATRIUM VOLUME: 56 CM3
LEFT INTERNAL DIMENSION IN SYSTOLE: 2.7 CM (ref 2.1–4)
LEFT VENTRICLE DIASTOLIC VOLUME INDEX: 55.71 ML/M2
LEFT VENTRICLE DIASTOLIC VOLUME: 100.27 ML
LEFT VENTRICLE MASS INDEX: 67.9 G/M2
LEFT VENTRICLE SYSTOLIC VOLUME INDEX: 14.6 ML/M2
LEFT VENTRICLE SYSTOLIC VOLUME: 26.28 ML
LEFT VENTRICULAR INTERNAL DIMENSION IN DIASTOLE: 4.7 CM (ref 3.5–6)
LEFT VENTRICULAR MASS: 122.3 G
LV LATERAL E/E' RATIO: 9
LV SEPTAL E/E' RATIO: 13.5
MV A" WAVE DURATION": 168.41 MS
MV PEAK A VEL: 0.73 M/S
MV PEAK E VEL: 0.81 M/S
OHS CV RV/LV RATIO: 0.68 CM
PISA TR MAX VEL: 2.4 M/S
PULM VEIN A" WAVE DURATION": 168.41 MS
PULM VEIN S/D RATIO: 1.25
PULMONIC VEIN PEAK A VELOCITY: 0.2 M/S
PV PEAK D VEL: 0.48 M/S
PV PEAK S VEL: 0.6 M/S
RA MAJOR: 4.04 CM
RA PRESSURE ESTIMATED: 3 MMHG
RA WIDTH: 3.22 CM
RIGHT ATRIAL AREA: 12.2 CM2
RIGHT VENTRICLE DIASTOLIC BASEL DIMENSION: 3.2 CM
RV TB RVSP: 5 MMHG
RV TISSUE DOPPLER FREE WALL SYSTOLIC VELOCITY 1 (APICAL 4 CHAMBER VIEW): 11.19 CM/S
SINUS: 3.42 CM
STJ: 3.2 CM
TDI LATERAL: 0.09 M/S
TDI SEPTAL: 0.06 M/S
TDI: 0.08 M/S
TR MAX PG: 23 MMHG
TRICUSPID ANNULAR PLANE SYSTOLIC EXCURSION: 1.46 CM
TV PEAK GRADIENT: 23 MMHG
TV REST PULMONARY ARTERY PRESSURE: 26 MMHG
Z-SCORE OF LEFT VENTRICULAR DIMENSION IN END DIASTOLE: -0.57
Z-SCORE OF LEFT VENTRICULAR DIMENSION IN END SYSTOLE: -1.03

## 2025-02-14 PROCEDURE — 93306 TTE W/DOPPLER COMPLETE: CPT

## 2025-02-14 PROCEDURE — 93306 TTE W/DOPPLER COMPLETE: CPT | Mod: 26,,, | Performed by: INTERNAL MEDICINE

## 2025-02-20 ENCOUNTER — OFFICE VISIT (OUTPATIENT)
Dept: ORTHOPEDICS | Facility: CLINIC | Age: 70
End: 2025-02-20
Payer: MEDICARE

## 2025-02-20 ENCOUNTER — HOSPITAL ENCOUNTER (OUTPATIENT)
Dept: RADIOLOGY | Facility: HOSPITAL | Age: 70
Discharge: HOME | End: 2025-02-20
Attending: NURSE PRACTITIONER
Payer: MEDICARE

## 2025-02-20 DIAGNOSIS — S72.001A CLOSED DISPLACED FRACTURE OF RIGHT FEMORAL NECK: Primary | ICD-10-CM

## 2025-02-20 DIAGNOSIS — M25.551 RIGHT HIP PAIN: Primary | ICD-10-CM

## 2025-02-20 DIAGNOSIS — M25.551 RIGHT HIP PAIN: ICD-10-CM

## 2025-02-20 PROCEDURE — 99213 OFFICE O/P EST LOW 20 MIN: CPT | Mod: PBBFAC,25 | Performed by: NURSE PRACTITIONER

## 2025-02-20 PROCEDURE — 73502 X-RAY EXAM HIP UNI 2-3 VIEWS: CPT | Mod: TC,RT

## 2025-02-20 NOTE — PROGRESS NOTES
Ms. Mcfarlane is here today for a follow up visit after undergoing the following:    Right total hip arthroplasty for femoral neck fracture      by Dr. Burnham on 8/28/2024.    Interval History:  She reports that she is doing well.  She has no pain.  She is not taking pain medication.  She planning a trip to [facility] tomorrow.  She reports having intermittent Trapezius muscle pain with overhead activity, no pain currently.  She denies any falls or injuries since surgery/last seen in the clinic.  She denies fever, chills, and sweats since the time of the surgery.     Physical exam:  The patient is ambulating independently.  The patient's incision has healed.  She has tactile stimulation to their lower leg, she denies calf pain, there is no leg edema and their pedal pulse is palpable x 2.  She has no pain with axial loading or log roll.  Hip ROM is 0-110 degrees.    RADS: Right hip xray was obtained, findings show a RUTH appears to be in good position.  No evidence of hardware failure noted.    Assessment:  6 month follow up visit     Plan:    ICD-10-CM ICD-9-CM   1. Closed displaced fracture of right femoral neck s/p right hemiarthroplasty 8/28/24  S72.001A 820.8     Current care, treatment plan, precautions, activity level/ modifications, limitations, rehabilitation exercises and proposed future treatment were discussed with the patient. We discussed the need to monitor for changes in symptoms and condition and report them to the physician.  Discussed importance of compliance with all appointments and follow up examinations.     69 Year old female who is 6 months status post right total hip arthroplasty .  Overall, she is doing quite well.  She is ambulating independently.  She has no pain involving the hip.    I will see the patient back in 6 months with repeat x-rays of the right hip.  If x-ray remains normal we will consider discharging.    In addition, patient reports she had some heart racing after getting her  evenity dose back in December.  She had a Holter monitor placed with no acute findings.  She is followed by endocrinology, Dr. Nails who informed the patient had she had a heart attack or stroke then the medication would need to be changed otherwise she recommends to continue taking the current dosing.    - Future Appointments:   Future Appointments   Date Time Provider Department Center   3/6/2025 10:45 AM INJECTION [facility]   3/19/2025 11:00 AM MONITOR, ARRHYTHMIA EVENT [facility] ADÁN Castaneda   4/7/2025  9:45 AM INJECTION [facility]   5/14/2025  8:40 AM [facility] Reeves [facility]       If there are any questions prior to scheduled follow up, the patient was instructed to contact the office

## 2025-02-24 DIAGNOSIS — Z00.00 ENCOUNTER FOR MEDICARE ANNUAL WELLNESS EXAM: ICD-10-CM

## 2025-03-13 ENCOUNTER — TELEPHONE (OUTPATIENT)
Dept: INFECTIOUS DISEASES | Facility: HOSPITAL | Age: 70
End: 2025-03-13
Payer: MEDICARE

## 2025-03-13 NOTE — TELEPHONE ENCOUNTER
Spoke with t in regards to scheduling her EVENITY injection. Pt would like to speak with her  before scheduling. I gave her the number to reach out to us when she is ready to schedule.

## 2025-04-08 ENCOUNTER — CLINICAL SUPPORT (OUTPATIENT)
Dept: CARDIOLOGY | Facility: HOSPITAL | Age: 70
End: 2025-04-08
Attending: INTERNAL MEDICINE
Payer: MEDICARE

## 2025-04-08 DIAGNOSIS — R00.0 WIDE-COMPLEX TACHYCARDIA: ICD-10-CM

## 2025-04-08 PROCEDURE — 93271 ECG/MONITORING AND ANALYSIS: CPT

## 2025-04-09 ENCOUNTER — PATIENT MESSAGE (OUTPATIENT)
Dept: ENDOCRINOLOGY | Facility: CLINIC | Age: 70
End: 2025-04-09
Payer: MEDICARE

## 2025-04-10 ENCOUNTER — TELEPHONE (OUTPATIENT)
Dept: INFECTIOUS DISEASES | Facility: HOSPITAL | Age: 70
End: 2025-04-10
Payer: MEDICARE

## 2025-04-10 ENCOUNTER — TELEPHONE (OUTPATIENT)
Dept: ENDOCRINOLOGY | Facility: CLINIC | Age: 70
End: 2025-04-10
Payer: MEDICARE

## 2025-04-10 NOTE — TELEPHONE ENCOUNTER
"----- Message from JASMINE Workman sent at 4/10/2025  2:10 PM CDT -----  I am reaching out regarding this patient and her EVENITY injections. She received 6/12 doses and developed new cardiac symptoms during treatment that I believe are currently still being worked up for a cause. She called to resume the Evenity injections and notified me that the cardiac symptoms started when she started Evenity and they have since stopped now that she is not taking the Evenity. Her last dose was 12/2024. Her symptoms resolved in February per patient. I reached out to Dr. Nails who prescribed the medication because a known adverse effect of the medication is "Romosozumab may increase the risk of myocardial infarction, stroke, and cardiovascular death. Romosozumab should not be initiated in patients who have had a myocardial infarction or stroke within the preceding year. Consider whether the benefits outweigh the risks in patients with other cardiovascular risk factors. If a patient experiences a myocardial infarction or stroke during therapy, romosozumab should be discontinued."and her reply was to resume the Evenity. I wanted to loop you in to be sure that the Evenity was ruled out as a cause of her new cardiac symptoms. Please advise if this has been worked up and ruled out as the cause or if the patient should switch to another medication for osteoporosis that does not carry the risk of cardiac side effects. This is a copy of the discussion with Dr. Nails and there are detailed notes in her chart, including the patients concern that Evenity may be the cause. [She should get her evenity injection, I will send her a message. SD----- Message -----From: Jacinta Okeefe RNSent: 4/9/2025   3:17 PM CDTTo: Wili Bello afternoon. I am reaching out regarding Ms Mcfarlane because she called to resume her Evenity injections. She received 6/12. She states that she started having heart problems during the Evenity that resolved once she " stopped taking the injections. There should be more information in her chart. Please let me know how to proceed with this patient.Thanks, Jacinta] Thank you, Jacinta

## 2025-04-10 NOTE — TELEPHONE ENCOUNTER
"Patient has received several Evenity injections - was rescheduled for this last injection due to new onset chest pains - Seen by cardiologist (Kaushik Escobar) and had conversations with Dr. Sakina Nails and Angel Olvera NP who have all cleared her to "Continue current medications"    Patient is comfortable receiving future doses of Evenity and will keep tomorrow's appointment as scheduled  "

## 2025-04-11 ENCOUNTER — INFUSION (OUTPATIENT)
Dept: INFECTIOUS DISEASES | Facility: HOSPITAL | Age: 70
End: 2025-04-11
Payer: MEDICARE

## 2025-04-11 VITALS
HEIGHT: 70 IN | OXYGEN SATURATION: 97 % | BODY MASS INDEX: 20.29 KG/M2 | HEART RATE: 68 BPM | RESPIRATION RATE: 19 BRPM | SYSTOLIC BLOOD PRESSURE: 151 MMHG | TEMPERATURE: 98 F | DIASTOLIC BLOOD PRESSURE: 68 MMHG | WEIGHT: 141.75 LBS

## 2025-04-11 DIAGNOSIS — R82.994 HYPERCALCIURIA: Primary | ICD-10-CM

## 2025-04-11 DIAGNOSIS — M80.00XD AGE-RELATED OSTEOPOROSIS WITH CURRENT PATHOLOGICAL FRACTURE WITH ROUTINE HEALING, SUBSEQUENT ENCOUNTER: ICD-10-CM

## 2025-04-11 PROCEDURE — 96372 THER/PROPH/DIAG INJ SC/IM: CPT

## 2025-04-11 NOTE — PROGRESS NOTES
"Patient called to resume Evenity injections. Reports developing cardiac symptoms after starting Evenity, which stopped once she stopped injections. Dose 6/12 was given in 12/2024 and patient reported last symptom was in February 2025.  Secure chats sent to Dr. Nails and Dr. Escobar to get clearance on resuming Evenity. Following are the correspondences.     RICK:  ----- Message -----   From: Jacinta Okeefe RN   Sent: 4/9/2025   3:17 PM CDT   To: Sakina Nails MD     Good afternoon. I am reaching out regarding Ms Mcfarlane because she called to resume her Evenity injections. She received 6/12. She states that she started having heart problems during the Evenity that resolved once she stopped taking the injections. There should be more information in her chart. Please let me know how to proceed with this patient.     Thanks, Sakina Thayer MD [place], Aimee L., RN  Caller: Unspecified (2 days ago,  3:16 PM)  She should get her evenity injection, I will send her a message.  SD     CASH:   Jacinta Okeefe RN Cash, Michael E., MD  Cc: Sakina Nails MD; Nas Mcneill MD; Ryne Moreira, PharmD; [facility]  Caller: Unspecified (Yesterday,  2:10 PM)  I am reaching out regarding this patient and her EVENITY injections. She received 6/12 doses and developed new cardiac symptoms during treatment that I believe are currently still being worked up for a cause. She called to resume the Evenity injections and notified me that the cardiac symptoms started when she started Evenity and they have since stopped now that she is not taking the Evenity. Her last dose was 12/2024. Her symptoms resolved in February per patient.    I reached out to Dr. Nails who prescribed the medication because a known adverse effect of the medication is    "Romosozumab may increase the risk of myocardial infarction, stroke, and cardiovascular death. Romosozumab should not be initiated in patients who " "have had a myocardial infarction or stroke within the preceding year. Consider whether the benefits outweigh the risks in patients with other cardiovascular risk factors. If a patient experiences a myocardial infarction or stroke during therapy, romosozumab should be discontinued."    and her reply was to resume the Evenity. I wanted to loop you in to be sure that the Evenity was ruled out as a cause of her new cardiac symptoms.    Please advise if this has been worked up and ruled out as the cause or if the patient should switch to another medication for osteoporosis that does not carry the risk of cardiac side effects.    This is a copy of the discussion with Dr. Nails and there are detailed notes in her chart, including the patients concern that Evenity may be the cause.    [She should get her evenity injection, I will send her a message.  SD  ----- Message -----  From: Jacinta Okeefe RN  Sent: 4/9/2025   3:17 PM CDT  To: Sakina Nails MD    Good afternoon. I am reaching out regarding Ms Mcfarlane because she called to resume her Evenity injections. She received 6/12. She states that she started having heart problems during the Evenity that resolved once she stopped taking the injections. There should be more information in her chart. Please let me know how to proceed with this patient.    Thanks, Jacinta]    Thank you, Jacinta    Via secure chat (PATRICK)    Dr. Escobar - this patient is scheduled to resume her evenity tomorrow. She was seen by your office and had a monitor and stress test done. We wanted to ensure that she is good to resume the Evenity. Dr. Nails, the ordering provider, says she is cleared.      Kaushik Escobar MD  The event monitor is not resulted but that's of less concern. Her echo looked fine. No barrier to proceeding.                    "

## 2025-04-21 ENCOUNTER — PATIENT MESSAGE (OUTPATIENT)
Dept: ADMINISTRATIVE | Facility: HOSPITAL | Age: 70
End: 2025-04-21
Payer: MEDICARE

## 2025-04-24 ENCOUNTER — TELEPHONE (OUTPATIENT)
Dept: VASCULAR SURGERY | Facility: CLINIC | Age: 70
End: 2025-04-24
Payer: MEDICARE

## 2025-04-24 NOTE — TELEPHONE ENCOUNTER
"Attempted to contact the pt on reference to appt scheduled 5/14/25 but  answered and verbalized "she's out for the night".Ascertained if the pt still needed to be seen for varicose veins and he verbalized "I don't know". Informed him to have the pt contact the clinic on tomorrow.Contact information provided and he verbalizes understanding of information received.  "

## 2025-04-25 ENCOUNTER — TELEPHONE (OUTPATIENT)
Dept: VASCULAR SURGERY | Facility: CLINIC | Age: 70
End: 2025-04-25
Payer: MEDICARE

## 2025-04-25 DIAGNOSIS — R60.0 LOCALIZED EDEMA: ICD-10-CM

## 2025-04-25 DIAGNOSIS — I83.819 VARICOSE VEINS WITH PAIN, UNSPECIFIED LATERALITY: Primary | ICD-10-CM

## 2025-04-25 NOTE — TELEPHONE ENCOUNTER
Spoke with the pt and informed her u/s needed prior to appt.Appt scheduled and confirmed with the pt and she verbalizes understanding of information received.

## 2025-04-30 ENCOUNTER — HOSPITAL ENCOUNTER (OUTPATIENT)
Dept: VASCULAR SURGERY | Facility: CLINIC | Age: 70
Discharge: HOME | End: 2025-04-30
Attending: SURGERY
Payer: MEDICARE

## 2025-04-30 DIAGNOSIS — I83.819 VARICOSE VEINS WITH PAIN, UNSPECIFIED LATERALITY: ICD-10-CM

## 2025-04-30 DIAGNOSIS — R60.0 LOCALIZED EDEMA: ICD-10-CM

## 2025-04-30 DIAGNOSIS — I87.2 VENOUS INSUFFICIENCY: Primary | ICD-10-CM

## 2025-04-30 PROCEDURE — 93970 EXTREMITY STUDY: CPT | Mod: PBBFAC | Performed by: SURGERY

## 2025-05-01 DIAGNOSIS — Z78.0 MENOPAUSE: ICD-10-CM

## 2025-05-06 ENCOUNTER — TELEPHONE (OUTPATIENT)
Dept: VASCULAR SURGERY | Facility: CLINIC | Age: 70
End: 2025-05-06
Payer: MEDICARE

## 2025-05-06 DIAGNOSIS — I87.2 VENOUS INSUFFICIENCY OF LOWER EXTREMITY, UNSPECIFIED LATERALITY: Primary | ICD-10-CM

## 2025-05-06 NOTE — TELEPHONE ENCOUNTER
Spoke with the pt and informed her that Dr Reeves is on sabbatical and is not returning.Appt on 5/14/25 will be cancelled and message sent to [facility] vein clinic to contact the pt to schedule an appt.Apologies given for any inconvenience and pt verbalized understanding of information received.

## 2025-05-08 ENCOUNTER — PATIENT OUTREACH (OUTPATIENT)
Dept: ADMINISTRATIVE | Facility: HOSPITAL | Age: 70
End: 2025-05-08
Payer: MEDICARE

## 2025-05-08 NOTE — PROGRESS NOTES
Chart reviewed and updated. Reconciled immunizations, health maintenance and care everywhere.  Dexa scan referral already in responding to portal msg for scheduling.      Amanda Chang, [facility]  Panel Care Coordinator  [facility]  [phone]  For Nas Mcneill MD

## 2025-05-09 DIAGNOSIS — G47.00 INSOMNIA, UNSPECIFIED TYPE: ICD-10-CM

## 2025-05-09 DIAGNOSIS — Z71.84 TRAVEL ADVICE ENCOUNTER: ICD-10-CM

## 2025-05-09 RX ORDER — ZOLPIDEM TARTRATE 5 MG/1
5 TABLET ORAL NIGHTLY PRN
Qty: 30 TABLET | Refills: 1 | Status: SHIPPED | OUTPATIENT
Start: 2025-05-09 | End: 2025-11-07

## 2025-05-12 ENCOUNTER — PATIENT MESSAGE (OUTPATIENT)
Dept: ENDOCRINOLOGY | Facility: CLINIC | Age: 70
End: 2025-05-12
Payer: MEDICARE

## 2025-05-12 ENCOUNTER — INFUSION (OUTPATIENT)
Dept: INFECTIOUS DISEASES | Facility: HOSPITAL | Age: 70
End: 2025-05-12
Payer: MEDICARE

## 2025-05-12 VITALS
HEIGHT: 70 IN | HEART RATE: 70 BPM | RESPIRATION RATE: 18 BRPM | WEIGHT: 142.19 LBS | DIASTOLIC BLOOD PRESSURE: 58 MMHG | BODY MASS INDEX: 20.36 KG/M2 | OXYGEN SATURATION: 99 % | SYSTOLIC BLOOD PRESSURE: 125 MMHG | TEMPERATURE: 98 F

## 2025-05-12 DIAGNOSIS — M80.00XD AGE-RELATED OSTEOPOROSIS WITH CURRENT PATHOLOGICAL FRACTURE WITH ROUTINE HEALING, SUBSEQUENT ENCOUNTER: ICD-10-CM

## 2025-05-12 DIAGNOSIS — R82.994 HYPERCALCIURIA: Primary | ICD-10-CM

## 2025-05-12 PROCEDURE — 96372 THER/PROPH/DIAG INJ SC/IM: CPT

## 2025-05-12 PROCEDURE — 63600175 PHARM REV CODE 636 W HCPCS: Mod: JZ,TB | Performed by: INTERNAL MEDICINE

## 2025-05-12 RX ADMIN — ROMOSOZUMAB-AQQG 210 MG: 105 INJECTION, SOLUTION SUBCUTANEOUS at 02:05

## 2025-05-12 NOTE — PROGRESS NOTES
Patient arrives for Evenity (2 separate injections) injection - confirms use of calcium and vitamin D supplements and denies dental procedures over past 3 months - administered per guidelines    Limited head-to-toe assessment due to privacy issues and visit reason though the opportunity was given for patient to express any concerns    Spoke with patient about DEXA scan and next steps - injections scheduled to end with understanding that if one is moved, there may need to be adjustment of all following

## 2025-05-13 ENCOUNTER — TELEPHONE (OUTPATIENT)
Dept: INTERNAL MEDICINE | Facility: CLINIC | Age: 70
End: 2025-05-13
Payer: MEDICARE

## 2025-05-13 NOTE — TELEPHONE ENCOUNTER
----- Message from [place] sent at 5/13/2025  2:50 PM CDT -----  Contact: [phone] or [phone]@patient  Type: Returning a callWho left a message? Pt When did the practice call?Does patient know what this is regarding:Would the patient rather a call back or a response via [facility]? Call back Comments: Pt call pt to discuss getting her  DXA Bone Density Axial Skeleton 1 or more sites. Pt says she had one done in March of 2023. Please call pt to advise  [phone] or [phone]

## 2025-06-04 ENCOUNTER — PATIENT MESSAGE (OUTPATIENT)
Dept: INTERNAL MEDICINE | Facility: CLINIC | Age: 70
End: 2025-06-04
Payer: MEDICARE

## 2025-06-04 ENCOUNTER — HOSPITAL ENCOUNTER (OUTPATIENT)
Dept: RADIOLOGY | Facility: HOSPITAL | Age: 70
Discharge: HOME | End: 2025-06-04
Attending: INTERNAL MEDICINE
Payer: MEDICARE

## 2025-06-04 DIAGNOSIS — Z12.31 ENCOUNTER FOR SCREENING MAMMOGRAM FOR BREAST CANCER: ICD-10-CM

## 2025-06-04 PROCEDURE — 77063 BREAST TOMOSYNTHESIS BI: CPT | Mod: 26,,, | Performed by: RADIOLOGY

## 2025-06-04 PROCEDURE — 77063 BREAST TOMOSYNTHESIS BI: CPT | Mod: TC

## 2025-06-04 PROCEDURE — 77067 SCR MAMMO BI INCL CAD: CPT | Mod: 26,,, | Performed by: RADIOLOGY

## 2025-06-12 ENCOUNTER — INFUSION (OUTPATIENT)
Dept: INFECTIOUS DISEASES | Facility: HOSPITAL | Age: 70
End: 2025-06-12
Payer: MEDICARE

## 2025-06-12 VITALS
WEIGHT: 142.5 LBS | TEMPERATURE: 98 F | RESPIRATION RATE: 20 BRPM | HEART RATE: 69 BPM | DIASTOLIC BLOOD PRESSURE: 70 MMHG | BODY MASS INDEX: 20.4 KG/M2 | SYSTOLIC BLOOD PRESSURE: 167 MMHG | HEIGHT: 70 IN | OXYGEN SATURATION: 99 %

## 2025-06-12 DIAGNOSIS — R82.994 HYPERCALCIURIA: Primary | ICD-10-CM

## 2025-06-12 DIAGNOSIS — M80.00XD AGE-RELATED OSTEOPOROSIS WITH CURRENT PATHOLOGICAL FRACTURE WITH ROUTINE HEALING, SUBSEQUENT ENCOUNTER: ICD-10-CM

## 2025-06-12 PROCEDURE — 63600175 PHARM REV CODE 636 W HCPCS: Mod: JZ,TB | Performed by: INTERNAL MEDICINE

## 2025-06-12 PROCEDURE — 96372 THER/PROPH/DIAG INJ SC/IM: CPT

## 2025-06-12 RX ADMIN — ROMOSOZUMAB-AQQG 210 MG: 105 INJECTION, SOLUTION SUBCUTANEOUS at 08:06

## 2025-07-14 ENCOUNTER — INFUSION (OUTPATIENT)
Dept: INFECTIOUS DISEASES | Facility: HOSPITAL | Age: 70
End: 2025-07-14
Payer: MEDICARE

## 2025-07-14 VITALS
DIASTOLIC BLOOD PRESSURE: 74 MMHG | RESPIRATION RATE: 16 BRPM | WEIGHT: 141.13 LBS | HEIGHT: 71 IN | TEMPERATURE: 99 F | OXYGEN SATURATION: 98 % | BODY MASS INDEX: 19.76 KG/M2 | SYSTOLIC BLOOD PRESSURE: 159 MMHG | HEART RATE: 70 BPM

## 2025-07-14 DIAGNOSIS — R82.994 HYPERCALCIURIA: Primary | ICD-10-CM

## 2025-07-14 DIAGNOSIS — M80.00XD AGE-RELATED OSTEOPOROSIS WITH CURRENT PATHOLOGICAL FRACTURE WITH ROUTINE HEALING, SUBSEQUENT ENCOUNTER: ICD-10-CM

## 2025-07-14 PROCEDURE — 63600175 PHARM REV CODE 636 W HCPCS: Mod: JZ,TB | Performed by: INTERNAL MEDICINE

## 2025-07-14 PROCEDURE — 96372 THER/PROPH/DIAG INJ SC/IM: CPT

## 2025-07-14 RX ADMIN — ROMOSOZUMAB-AQQG 210 MG: 105 INJECTION, SOLUTION SUBCUTANEOUS at 08:07

## 2025-07-14 NOTE — PROGRESS NOTES
Patient arrives for Evenity (2 separate injections) injection - confirms use of calcium and vitamin D supplements and denies dental procedures over past 3 months - administered per guidelines.    Limited head-to-toe assessment due to privacy issues and visit reason though the opportunity was given for patient to express any concerns.    Patient arrives for evenityas ordered by Dr. Nails. All benefits, risks, requirements, and alternatives should have been discussed with patient by ordering provider at the time the order was placed.

## 2025-07-21 ENCOUNTER — PATIENT MESSAGE (OUTPATIENT)
Dept: ADMINISTRATIVE | Facility: HOSPITAL | Age: 70
End: 2025-07-21
Payer: MEDICARE

## 2025-07-30 ENCOUNTER — HOSPITAL ENCOUNTER (OUTPATIENT)
Dept: RADIOLOGY | Facility: HOSPITAL | Age: 70
Discharge: HOME | End: 2025-07-30
Attending: SURGERY
Payer: MEDICARE

## 2025-07-30 DIAGNOSIS — I87.2 VENOUS INSUFFICIENCY OF LOWER EXTREMITY, UNSPECIFIED LATERALITY: ICD-10-CM

## 2025-07-30 PROCEDURE — 93970 EXTREMITY STUDY: CPT | Mod: TC

## 2025-07-30 PROCEDURE — 93970 EXTREMITY STUDY: CPT | Mod: 26,,, | Performed by: RADIOLOGY

## 2025-08-08 ENCOUNTER — OFFICE VISIT (OUTPATIENT)
Dept: VASCULAR SURGERY | Facility: CLINIC | Age: 70
End: 2025-08-08
Payer: MEDICARE

## 2025-08-08 VITALS
HEIGHT: 71 IN | WEIGHT: 141.13 LBS | HEART RATE: 64 BPM | DIASTOLIC BLOOD PRESSURE: 80 MMHG | BODY MASS INDEX: 19.76 KG/M2 | SYSTOLIC BLOOD PRESSURE: 160 MMHG

## 2025-08-08 DIAGNOSIS — I87.2 VENOUS INSUFFICIENCY: Primary | ICD-10-CM

## 2025-08-08 DIAGNOSIS — I83.819 VARICOSE VEINS WITH PAIN, UNSPECIFIED LATERALITY: ICD-10-CM

## 2025-08-08 DIAGNOSIS — I83.93 SPIDER VEINS OF BOTH LOWER EXTREMITIES: ICD-10-CM

## 2025-08-08 NOTE — PROGRESS NOTES
History of Present Illness    Ms. Mcfarlane presents today for evaluation of painful varicose veins. She has protruding varicose veins in RLE, both inside and front surfaces. She previously underwent sclerotherapy treatments in 2004 and 2005 at [facility] where veins temporarily disappeared following injection procedures. She expresses interest in compression sock usage during travel. She has a history of high instep which has caused discomfort and requires careful movement. She sustained RLE hip fracture 1 year ago and reports complete physical recovery. She had a broken foot in 2009. She is retired, previously employed at a chemical plant until 2009. She remains very active and continues to engage in daily activities. She reports minimal salt intake. She previously used prescription compression stockings while working but discontinued use due to discomfort at the instep.    This note was generated with the assistance of ambient listening technology. Verbal consent was obtained by the patient and accompanying visitor(s) for the recording of patient appointment to facilitate this note. I attest to having reviewed and edited the generated note for accuracy, though some syntax or spelling errors may persist. Please contact the author of this note for any clarification.      ROS:  General: -fever, -chills, -fatigue, -weight gain, -weight loss  Eyes: -vision changes, -redness, -discharge  ENT: -ear pain, -nasal congestion, -sore throat  Cardiovascular: -chest pain, -palpitations, +lower extremity edema, +calf pain  Respiratory: -cough, -shortness of breath  Gastrointestinal: -abdominal pain, -nausea, -vomiting, -diarrhea, -constipation, -blood in stool  Genitourinary: -dysuria, -hematuria, -frequency  Musculoskeletal: -joint pain, -muscle pain  Skin: -rash, -lesion  Neurological: -headache, -dizziness, -numbness, -tingling  Psychiatric: -anxiety, -depression, -sleep difficulty    This note was generated with the  assistance of ambient listening technology. Verbal consent was obtained by the patient and accompanying visitor(s) for the recording of patient appointment to facilitate this note. I attest to having reviewed and edited the generated note for accuracy, though some syntax or spelling errors may persist. Please contact the author of this note for any clarification.       Past Medical History:   Diagnosis Date    Age-related osteoporosis with current pathological fracture     Aortic calcification 04/21/2023    Seen on X-ray chest 04/03/2020      Randolph-Walker grade 1 cystocele 03/07/2024    Constipation - functional     Encounter for blood transfusion     History of COVID-19 10/27/2021    Kidney calculi     Melanoma     Mitral valve prolapse     Mixed stress and urge urinary incontinence 03/07/2024    Osteoporosis, unspecified     Primary insomnia 01/20/2016    Stroke     tia    Tenosynovitis, de Quervain 10/06/2021       Past Surgical History:   Procedure Laterality Date    APPENDECTOMY      COLON SURGERY      COLONOSCOPY      COLONOSCOPY N/A 12/15/2015    Procedure: COLONOSCOPY;  Surgeon: ADALBERTO Liao MD;  Location: [address];  Service: Endoscopy;  Laterality: N/A;    CYSTOSCOPY WITH CYSTOMETROGRAPHY N/A 3/7/2024    Procedure: CYSTOSCOPY, WITH CYSTOMETROGRAM;  Surgeon: Jabari Hager MD;  Location: [facility] OR;  Service: Urology;  Laterality: N/A;    CYSTOSCOPY WITH URODYNAMIC TESTING N/A 3/7/2024    Procedure: CYSTOSCOPY, WITH URODYNAMIC TESTING;  Surgeon: Jabari Hager MD;  Location: [facility] OR;  Service: Urology;  Laterality: N/A;    DE QUERVAIN'S RELEASE Right 10/6/2021    Procedure: RELEASE, HAND, FOR DEQUERVAIN'S TENOSYNOVITIS - right;  Surgeon: Aric Leonard MD;  Location: [facility] OR;  Service: Orthopedics;  Laterality: Right;    ELECTROMYOGRAPHY N/A 3/7/2024    Procedure: EMG (ELECTROMYOGRAPHY);  Surgeon: Jabari Hager MD;  Location: [facility] OR;  Service: Urology;  Laterality: N/A;     ESOPHAGOGASTRODUODENOSCOPY N/A 4/8/2020    Procedure: EGD (ESOPHAGOGASTRODUODENOSCOPY);  Surgeon: Jonathan Roth MD;  Location: [facility] (2ND FLR);  Service: Endoscopy;  Laterality: N/A;  check stat hct before     labs will be drawn prior-GT    HIP ARTHROPLASTY Right 8/28/2024    Procedure: ARTHROPLASTY, HIP;  Surgeon: Robbin Burnham MD;  [address]  Service: Orthopedics;  Laterality: Right;    HYSTERECTOMY      INTUSSUSCEPTION REPAIR      LASIK      LEFT COLECTOMY      melanoma on face removal      PARTIAL HYSTERECTOMY      UROFLOWMETRY N/A 3/7/2024    Procedure: UROFLOWMETRY;  Surgeon: Jabari Hager MD;  Location: [facility];  Service: Urology;  Laterality: N/A;    VOIDING URETHROCYSTOGRAPHY N/A 3/7/2024    Procedure: CYSTOURETHROGRAM, VOIDING;  Surgeon: Jabari Hager MD;  Location: [facility];  Service: Urology;  Laterality: N/A;       Family History   Problem Relation Name Age of Onset    Hepatitis Mother      Diabetes Father      Diabetes Brother Goran     Parkinsonism Brother Goran     Hyperlipidemia Brother Marc     Hypertension Brother Marc     Diabetes Brother Marc     Hyperlipidemia Brother Kraig     Hypertension Brother Kraig     Prostate cancer Brother Kraig     Heart disease Brother Faizan     Autism Son       Social History[1]    Current Medications[2]    Physical Exam    General Appearance: Alert. Well-appearing. In no distress. Oriented to person, place, and time.  Neurological: Normal speech. No focal findings noted. RLE with sensation to light touch. LLE with sensation to light touch.  Musculoskeletal: No cyanosis. No clubbing. Strength 5/5 BLE. High instep noted bilaterally.  Neck: Supple.  Chest: No use of accessory muscles.  Cardiac: Normal rate. Protruding varicose vein in right lower leg medially and anteriorly. Spider veins in right foot. Normal pulse in right foot. Spider veins on left posterior knee. Spider veins on left lateral thigh. Spider veins on  left medial thigh. Minimal spider veins on right posterior thigh. Spider veins on right posterior knee. Spider veins in left foot.  Abdomen: Nondistended.  Extremities: Normal color. No bilateral lower extremity edema.  Skin: No ulcers BLE. No spider veins BLE. No varicose veins BLE.    This note was generated with the assistance of ambient listening technology. Verbal consent was obtained by the patient and accompanying visitor(s) for the recording of patient appointment to facilitate this note. I attest to having reviewed and edited the generated note for accuracy, though some syntax or spelling errors may persist. Please contact the author of this note for any clarification.       4/2025    Right Leg: Duplex imaging of the right lower extremity veins demonstrates patent and compressible veins.   There is no evidence of a venous thrombosis in the deep or superficial veins.   Significant reflux noted in the right GSV including the Saphenofemoral Junction (SFJ), the SSV including the Saphenopopliteal   Junction (SPJ) and in the Accessory vein.   The GSV branches extensively throughout its course.   Incidental finding of significant reflux noted in the distal femoral vein.     Left Leg: Duplex imaging of the left lower extremity veins demonstrates patent and compressible veins.   There is no evidence of a venous thrombosis in the deep or superficial veins.   Significant reflux noted in the left GSV including the Saphenofemoral Junction (SFJ). No reflux noted in the SSV or Accessory Vein.   The GSV branches extensively within the calf.       DATE OF SERVICE: 04/30/2025     Assessment & Plan    VARICOSE VEINS OF LOWER EXTREMITIES:  - Described EVLT procedure, including use of laser energy to close off weak vein valves and reduce leg pressure, with potential outcomes including reduction or elimination of varicose veins and improvement in inflammation and pressure symptoms. Provided information on EVLT as a low-risk  procedure with potential for minor side effects such as bleeding, infection, inflammation, and bruising. Advised wearing compression stockings (knee-high or thigh-high, 20-30 pressure) in the morning and removing at night, staying active and elevating legs when sitting. Instructed to wear compression socks and walk around every 1 to 1.5 hours during travel. Recommend applying warm heating pad or warm washcloth to inflamed areas as needed. Take Advil as needed for inflammation.    VENOUS INSUFFICIENCY:  - Follow up to consider future treatment for left leg vein reflux and associated spider veins.    HYPERTENSION:  - Encouraged continuing to monitor sodium intake and check food labels for sodium content.    FOLLOW-UP:  - Follow up after EVLT procedure to assess improvement and discuss potential treatment for right calf vein and spider veins.    PLAN SUMMARY:  - Wear compression socks during travel  - Take Advil as needed for inflammation  - Apply warm heating pad or warm washcloth to inflamed areas as needed  - Wear compression stockings (knee-high or thigh-high, 20-30 pressure) in the morning, remove at night  - Stay active and elevate legs when sitting  - EVLT procedure discussed as potential treatment for left leg vein reflux - rec R GSV and AASV EVLT first and second subsequent L GSV EVLT  - future RLE stab phlebectomy and BLE sclerotherapy  - Follow up to consider future treatment for left leg vein reflux and associated spider veins  - Follow up after EVLT procedure to assess improvement and discuss potential treatment for right calf vein and spider veins       I spent 11 minutes evaluating this patient and greater than 50% of the time was spent counseling, coordinator care and discussing the plan of care.  All questions were answered and patient stated understanding with agreement with the above treatment plan.    Loco Pleitez M.D., R.P.V.IBob [facility]Pres[facility]           [1]   Social  History  Socioeconomic History    Marital status:    Tobacco Use    Smoking status: Never    Smokeless tobacco: Never   Substance and Sexual Activity    Alcohol use: Never    Drug use: Never    Sexual activity: Not Currently     Social Drivers of Health     Financial Resource Strain: Low Risk  (6/27/2024)    Overall Financial Resource Strain (CARDIA)     Difficulty of Paying Living Expenses: Not hard at all   Food Insecurity: Unknown (6/27/2024)    Hunger Vital Sign     Worried About Running Out of Food in the Last Year: Never true   Transportation Needs: No Transportation Needs (4/20/2023)    PRAPARE - Transportation     Lack of Transportation (Medical): No     Lack of Transportation (Non-Medical): No   Physical Activity: Unknown (6/27/2024)    Exercise Vital Sign     Minutes of Exercise per Session: 50 min   Stress: No Stress Concern Present (6/27/2024)    [employer] [place] [employer] - Occupational Stress Questionnaire     Feeling of Stress : Not at all   Housing Stability: Low Risk  (4/20/2023)    Housing Stability Vital Sign     Unable to Pay for Housing in the Last Year: No     Number of Places Lived in the Last Year: 1     Unstable Housing in the Last Year: No   [2]   Current Outpatient Medications:     ascorbic acid, vitamin C, (VITAMIN C) 500 MG tablet, Take 500 mg by mouth once daily., Disp: , Rfl:     CALCIUM CARBONATE/VITAMIN D3 (VITAMIN D-3 ORAL), Take 1 tablet by mouth Daily., Disp: , Rfl:     estradioL (ESTRACE) 0.01 % (0.1 mg/gram) vaginal cream, Place 1 g vaginally every 7 days., Disp: , Rfl:     fish oil-omega-3 fatty acids 300-1,000 mg capsule, Take 2 g by mouth once daily., Disp: , Rfl:     magnesium 30 mg Tab, Take 1 tablet by mouth once daily. , Disp: , Rfl:     polyethylene glycol (MIRALAX) 17 gram/dose powder, Take 17 g by mouth once daily., Disp: , Rfl:     romosozumab-aqqg (EVENITY) 105 mg/1.17 mL, , Disp: , Rfl:     zolpidem (AMBIEN) 5 MG Tab, Take 1 tablet (5 mg total)  by mouth nightly as needed (insomnia)., Disp: 30 tablet, Rfl: 1    metoprolol succinate (TOPROL-XL) 25 MG 24 hr tablet, Take 1 tablet (25 mg total) by mouth once daily., Disp: 30 tablet, Rfl: 11

## 2025-08-12 ENCOUNTER — PATIENT MESSAGE (OUTPATIENT)
Dept: ORTHOPEDICS | Facility: CLINIC | Age: 70
End: 2025-08-12
Payer: MEDICARE

## 2025-08-13 ENCOUNTER — OFFICE VISIT (OUTPATIENT)
Dept: CARDIOLOGY | Facility: CLINIC | Age: 70
End: 2025-08-13
Payer: MEDICARE

## 2025-08-13 VITALS
BODY MASS INDEX: 19.82 KG/M2 | HEART RATE: 64 BPM | WEIGHT: 141.56 LBS | SYSTOLIC BLOOD PRESSURE: 127 MMHG | DIASTOLIC BLOOD PRESSURE: 73 MMHG | OXYGEN SATURATION: 98 % | HEIGHT: 71 IN

## 2025-08-13 DIAGNOSIS — I70.0 AORTIC CALCIFICATION: Primary | ICD-10-CM

## 2025-08-13 DIAGNOSIS — I87.2 VENOUS INSUFFICIENCY: ICD-10-CM

## 2025-08-13 DIAGNOSIS — I83.819 VARICOSE VEINS WITH PAIN, UNSPECIFIED LATERALITY: ICD-10-CM

## 2025-08-13 PROCEDURE — 99214 OFFICE O/P EST MOD 30 MIN: CPT | Mod: PBBFAC | Performed by: INTERNAL MEDICINE

## 2025-08-13 PROCEDURE — 99999 PR PBB SHADOW E&M-EST. PATIENT-LVL IV: CPT | Mod: PBBFAC,,, | Performed by: INTERNAL MEDICINE

## 2025-08-13 PROCEDURE — 99214 OFFICE O/P EST MOD 30 MIN: CPT | Mod: S$PBB,,, | Performed by: INTERNAL MEDICINE

## 2025-08-14 ENCOUNTER — INFUSION (OUTPATIENT)
Dept: INFECTIOUS DISEASES | Facility: HOSPITAL | Age: 70
End: 2025-08-14
Payer: MEDICARE

## 2025-08-14 ENCOUNTER — OFFICE VISIT (OUTPATIENT)
Dept: ORTHOPEDICS | Facility: CLINIC | Age: 70
End: 2025-08-14
Payer: MEDICARE

## 2025-08-14 ENCOUNTER — HOSPITAL ENCOUNTER (OUTPATIENT)
Dept: RADIOLOGY | Facility: HOSPITAL | Age: 70
Discharge: HOME | End: 2025-08-14
Attending: NURSE PRACTITIONER
Payer: MEDICARE

## 2025-08-14 VITALS
OXYGEN SATURATION: 99 % | TEMPERATURE: 99 F | HEART RATE: 63 BPM | DIASTOLIC BLOOD PRESSURE: 61 MMHG | RESPIRATION RATE: 16 BRPM | HEIGHT: 71 IN | WEIGHT: 140.63 LBS | BODY MASS INDEX: 19.69 KG/M2 | SYSTOLIC BLOOD PRESSURE: 129 MMHG

## 2025-08-14 DIAGNOSIS — M25.551 RIGHT HIP PAIN: ICD-10-CM

## 2025-08-14 DIAGNOSIS — S72.001A CLOSED DISPLACED FRACTURE OF RIGHT FEMORAL NECK: Primary | ICD-10-CM

## 2025-08-14 DIAGNOSIS — R82.994 HYPERCALCIURIA: Primary | ICD-10-CM

## 2025-08-14 DIAGNOSIS — M80.00XD AGE-RELATED OSTEOPOROSIS WITH CURRENT PATHOLOGICAL FRACTURE WITH ROUTINE HEALING, SUBSEQUENT ENCOUNTER: ICD-10-CM

## 2025-08-14 DIAGNOSIS — M25.551 RIGHT HIP PAIN: Primary | ICD-10-CM

## 2025-08-14 PROCEDURE — 96372 THER/PROPH/DIAG INJ SC/IM: CPT

## 2025-08-14 PROCEDURE — 99999 PR PBB SHADOW E&M-EST. PATIENT-LVL III: CPT | Mod: PBBFAC,,, | Performed by: NURSE PRACTITIONER

## 2025-08-14 PROCEDURE — 99213 OFFICE O/P EST LOW 20 MIN: CPT | Mod: S$PBB,,, | Performed by: NURSE PRACTITIONER

## 2025-08-14 PROCEDURE — 73502 X-RAY EXAM HIP UNI 2-3 VIEWS: CPT | Mod: TC,RT

## 2025-08-14 PROCEDURE — 99213 OFFICE O/P EST LOW 20 MIN: CPT | Mod: PBBFAC,25 | Performed by: NURSE PRACTITIONER

## 2025-08-14 PROCEDURE — 73502 X-RAY EXAM HIP UNI 2-3 VIEWS: CPT | Mod: 26,RT,, | Performed by: RADIOLOGY

## 2025-08-14 PROCEDURE — 63600175 PHARM REV CODE 636 W HCPCS: Mod: JZ,TB | Performed by: INTERNAL MEDICINE

## 2025-08-14 RX ADMIN — ROMOSOZUMAB-AQQG 210 MG: 105 INJECTION, SOLUTION SUBCUTANEOUS at 02:08

## 2025-08-15 DIAGNOSIS — R60.0 LOCALIZED EDEMA: ICD-10-CM

## 2025-08-15 DIAGNOSIS — I87.2 VENOUS INSUFFICIENCY: Primary | ICD-10-CM

## 2025-08-15 DIAGNOSIS — I83.819 VARICOSE VEINS WITH PAIN, UNSPECIFIED LATERALITY: ICD-10-CM

## 2025-08-19 ENCOUNTER — PATIENT MESSAGE (OUTPATIENT)
Dept: VASCULAR SURGERY | Facility: CLINIC | Age: 70
End: 2025-08-19
Payer: MEDICARE

## 2025-08-26 ENCOUNTER — TELEPHONE (OUTPATIENT)
Dept: VASCULAR SURGERY | Facility: CLINIC | Age: 70
End: 2025-08-26
Payer: MEDICARE

## 2025-08-26 DIAGNOSIS — R60.0 LOCALIZED EDEMA: ICD-10-CM

## 2025-08-26 DIAGNOSIS — I87.2 VENOUS INSUFFICIENCY: Primary | ICD-10-CM

## 2025-08-26 DIAGNOSIS — I83.819 VARICOSE VEINS WITH PAIN, UNSPECIFIED LATERALITY: ICD-10-CM

## (undated) DEVICE — BLADE SAGITTAL 18 X 1.27 X 90M

## (undated) DEVICE — UNDERGLOVES BIOGEL PI SIZE 8

## (undated) DEVICE — BANDAGE ELASTIC 2X5 VELCRO ST

## (undated) DEVICE — SUT MONOCRYL 3-0 PS-2 UND

## (undated) DEVICE — MARKER SKIN RULER STERILE

## (undated) DEVICE — DRAPE STERI-DRAPE 1000 17X11IN

## (undated) DEVICE — KIT TOTAL HIP HPOFH OMC

## (undated) DEVICE — PAD CAST 2 IN X 4YDS STERILE

## (undated) DEVICE — TOURNIQUET SB QC DP 18X4IN

## (undated) DEVICE — PENCIL VALLEYLAB TELSCP SMK

## (undated) DEVICE — GAUZE SPONGE 4X4 12PLY

## (undated) DEVICE — KIT IRR SUCTION HND PIECE

## (undated) DEVICE — SPLINT PLASTER EXT FAST 4X15

## (undated) DEVICE — ELECTRODE REM PLYHSV RETURN 9

## (undated) DEVICE — DRAPE PLASTIC U 60X72

## (undated) DEVICE — RETRIEVER SUTURE HEWSON DISP

## (undated) DEVICE — COVER PROXIMA MAYO STAND

## (undated) DEVICE — SPONGE COTTON TRAY 4X4IN

## (undated) DEVICE — SOL IRR NACL .9% 3000ML

## (undated) DEVICE — SYS CLSR DERMABOND PRINEO 22CM

## (undated) DEVICE — PAD CAST SPECIALIST STRL 4

## (undated) DEVICE — GLOVE PI ULTRA TOUCH G SURGEON

## (undated) DEVICE — SET CEMENT (SCULP)

## (undated) DEVICE — STOCKINETTE DBL PLY ST 4X

## (undated) DEVICE — SUT VICRYL+ 1 CT1 18IN

## (undated) DEVICE — DRAPE THREE-QTR REINF 53X77IN

## (undated) DEVICE — CORD BIPOLAR 12 FOOT

## (undated) DEVICE — SUT VICRYL PLUS 2-0 CT1 18

## (undated) DEVICE — DRESSING TRANS 4X4 TEGADERM

## (undated) DEVICE — NDL 22GA X1 1/2 REG BEVEL

## (undated) DEVICE — PRESSURIZER HI VAC HIP KIT

## (undated) DEVICE — DRAPE STERI INSTRUMENT 1018

## (undated) DEVICE — BRUSH INTRAMEDULLARY

## (undated) DEVICE — SUT VICRYL PLUS 0 CT1 18IN

## (undated) DEVICE — TIP IRR FEM CANAL CO-AXIAL

## (undated) DEVICE — SEE MEDLINE ITEM 146308

## (undated) DEVICE — HOOD T7 W/ PEEL AWAY LENS

## (undated) DEVICE — SUT 4-0 ETHILON 18 PS-2

## (undated) DEVICE — TAPE SURG DURAPORE 2 X10YD

## (undated) DEVICE — SUT FIBERWIRE #5

## (undated) DEVICE — Device